# Patient Record
Sex: MALE | Race: WHITE | NOT HISPANIC OR LATINO | Employment: OTHER | URBAN - METROPOLITAN AREA
[De-identification: names, ages, dates, MRNs, and addresses within clinical notes are randomized per-mention and may not be internally consistent; named-entity substitution may affect disease eponyms.]

---

## 2017-12-06 ENCOUNTER — HOSPITAL ENCOUNTER (OUTPATIENT)
Dept: CARDIOLOGY | Facility: HOSPITAL | Age: 67
Discharge: HOME | End: 2017-12-06
Attending: INTERNAL MEDICINE | Admitting: INTERNAL MEDICINE

## 2020-12-11 ENCOUNTER — HOSPITAL ENCOUNTER (EMERGENCY)
Facility: HOSPITAL | Age: 70
Discharge: HOME | End: 2020-12-11
Admitting: EMERGENCY MEDICINE

## 2020-12-11 ENCOUNTER — APPOINTMENT (OUTPATIENT)
Dept: CT IMAGING | Facility: HOSPITAL | Age: 70
End: 2020-12-11

## 2020-12-11 VITALS
BODY MASS INDEX: 32.58 KG/M2 | TEMPERATURE: 98 F | HEART RATE: 60 BPM | WEIGHT: 215 LBS | DIASTOLIC BLOOD PRESSURE: 76 MMHG | HEIGHT: 68 IN | OXYGEN SATURATION: 99 % | RESPIRATION RATE: 18 BRPM | SYSTOLIC BLOOD PRESSURE: 144 MMHG

## 2020-12-11 DIAGNOSIS — K44.9 HIATAL HERNIA: ICD-10-CM

## 2020-12-11 DIAGNOSIS — N28.9 RENAL LESION: ICD-10-CM

## 2020-12-11 DIAGNOSIS — R10.13 EPIGASTRIC PAIN: Primary | ICD-10-CM

## 2020-12-11 DIAGNOSIS — R11.2 NAUSEA AND VOMITING, INTRACTABILITY OF VOMITING NOT SPECIFIED, UNSPECIFIED VOMITING TYPE: ICD-10-CM

## 2020-12-11 LAB
ALBUMIN SERPL-MCNC: 3.5 G/DL (ref 3.5–5.2)
ALBUMIN/GLOB SERPL: 1 G/DL
ALP SERPL-CCNC: 75 U/L (ref 39–117)
ALT SERPL W P-5'-P-CCNC: 16 U/L (ref 1–41)
ANION GAP SERPL CALCULATED.3IONS-SCNC: 12 MMOL/L (ref 5–15)
AST SERPL-CCNC: 35 U/L (ref 1–40)
BASOPHILS # BLD AUTO: 0 10*3/MM3 (ref 0–0.2)
BASOPHILS NFR BLD AUTO: 0.5 % (ref 0–1.5)
BILIRUB SERPL-MCNC: 1 MG/DL (ref 0–1.2)
BILIRUB UR QL STRIP: NEGATIVE
BUN SERPL-MCNC: 14 MG/DL (ref 8–23)
BUN/CREAT SERPL: 13 (ref 7–25)
CALCIUM SPEC-SCNC: 8.7 MG/DL (ref 8.6–10.5)
CHLORIDE SERPL-SCNC: 104 MMOL/L (ref 98–107)
CLARITY UR: CLEAR
CO2 SERPL-SCNC: 22 MMOL/L (ref 22–29)
COLOR UR: YELLOW
CREAT SERPL-MCNC: 1.08 MG/DL (ref 0.76–1.27)
DEPRECATED RDW RBC AUTO: 39.4 FL (ref 37–54)
EOSINOPHIL # BLD AUTO: 0 10*3/MM3 (ref 0–0.4)
EOSINOPHIL NFR BLD AUTO: 0 % (ref 0.3–6.2)
ERYTHROCYTE [DISTWIDTH] IN BLOOD BY AUTOMATED COUNT: 13.1 % (ref 12.3–15.4)
GFR SERPL CREATININE-BSD FRML MDRD: 67 ML/MIN/1.73
GLOBULIN UR ELPH-MCNC: 3.6 GM/DL
GLUCOSE SERPL-MCNC: 150 MG/DL (ref 65–99)
GLUCOSE UR STRIP-MCNC: NEGATIVE MG/DL
HCT VFR BLD AUTO: 36.9 % (ref 37.5–51)
HGB BLD-MCNC: 12.7 G/DL (ref 13–17.7)
HGB UR QL STRIP.AUTO: NEGATIVE
KETONES UR QL STRIP: ABNORMAL
LEUKOCYTE ESTERASE UR QL STRIP.AUTO: NEGATIVE
LIPASE SERPL-CCNC: 34 U/L (ref 13–60)
LYMPHOCYTES # BLD AUTO: 1.1 10*3/MM3 (ref 0.7–3.1)
LYMPHOCYTES NFR BLD AUTO: 11.8 % (ref 19.6–45.3)
MCH RBC QN AUTO: 29.9 PG (ref 26.6–33)
MCHC RBC AUTO-ENTMCNC: 34.4 G/DL (ref 31.5–35.7)
MCV RBC AUTO: 86.9 FL (ref 79–97)
MONOCYTES # BLD AUTO: 0.6 10*3/MM3 (ref 0.1–0.9)
MONOCYTES NFR BLD AUTO: 6.6 % (ref 5–12)
NEUTROPHILS NFR BLD AUTO: 7.3 10*3/MM3 (ref 1.7–7)
NEUTROPHILS NFR BLD AUTO: 81.1 % (ref 42.7–76)
NITRITE UR QL STRIP: NEGATIVE
NRBC BLD AUTO-RTO: 0 /100 WBC (ref 0–0.2)
PH UR STRIP.AUTO: 8 [PH] (ref 5–8)
PLATELET # BLD AUTO: 256 10*3/MM3 (ref 140–450)
PMV BLD AUTO: 7.2 FL (ref 6–12)
POTASSIUM SERPL-SCNC: 3.5 MMOL/L (ref 3.5–5.2)
PROT SERPL-MCNC: 7.1 G/DL (ref 6–8.5)
PROT UR QL STRIP: NEGATIVE
RBC # BLD AUTO: 4.25 10*6/MM3 (ref 4.14–5.8)
SODIUM SERPL-SCNC: 138 MMOL/L (ref 136–145)
SP GR UR STRIP: 1.01 (ref 1–1.03)
TROPONIN T SERPL-MCNC: <0.01 NG/ML (ref 0–0.03)
UROBILINOGEN UR QL STRIP: ABNORMAL
WBC # BLD AUTO: 9 10*3/MM3 (ref 3.4–10.8)

## 2020-12-11 PROCEDURE — 96376 TX/PRO/DX INJ SAME DRUG ADON: CPT

## 2020-12-11 PROCEDURE — 25010000002 ONDANSETRON PER 1 MG: Performed by: PHYSICIAN ASSISTANT

## 2020-12-11 PROCEDURE — 0 IOPAMIDOL PER 1 ML: Performed by: PHYSICIAN ASSISTANT

## 2020-12-11 PROCEDURE — 96374 THER/PROPH/DIAG INJ IV PUSH: CPT

## 2020-12-11 PROCEDURE — 25010000002 ONDANSETRON PER 1 MG: Performed by: EMERGENCY MEDICINE

## 2020-12-11 PROCEDURE — 83690 ASSAY OF LIPASE: CPT | Performed by: PHYSICIAN ASSISTANT

## 2020-12-11 PROCEDURE — 85025 COMPLETE CBC W/AUTO DIFF WBC: CPT | Performed by: PHYSICIAN ASSISTANT

## 2020-12-11 PROCEDURE — 25010000002 LORAZEPAM PER 2 MG: Performed by: EMERGENCY MEDICINE

## 2020-12-11 PROCEDURE — 84484 ASSAY OF TROPONIN QUANT: CPT | Performed by: PHYSICIAN ASSISTANT

## 2020-12-11 PROCEDURE — 80053 COMPREHEN METABOLIC PANEL: CPT | Performed by: PHYSICIAN ASSISTANT

## 2020-12-11 PROCEDURE — 96361 HYDRATE IV INFUSION ADD-ON: CPT

## 2020-12-11 PROCEDURE — 96375 TX/PRO/DX INJ NEW DRUG ADDON: CPT

## 2020-12-11 PROCEDURE — 74177 CT ABD & PELVIS W/CONTRAST: CPT

## 2020-12-11 PROCEDURE — 81003 URINALYSIS AUTO W/O SCOPE: CPT | Performed by: PHYSICIAN ASSISTANT

## 2020-12-11 PROCEDURE — 99283 EMERGENCY DEPT VISIT LOW MDM: CPT

## 2020-12-11 RX ORDER — LIDOCAINE HYDROCHLORIDE 20 MG/ML
15 SOLUTION OROPHARYNGEAL ONCE
Status: COMPLETED | OUTPATIENT
Start: 2020-12-11 | End: 2020-12-11

## 2020-12-11 RX ORDER — HYDROXYZINE HYDROCHLORIDE 25 MG/1
25 TABLET, FILM COATED ORAL EVERY 6 HOURS PRN
Qty: 16 TABLET | Refills: 0 | Status: SHIPPED | OUTPATIENT
Start: 2020-12-11

## 2020-12-11 RX ORDER — SODIUM CHLORIDE 0.9 % (FLUSH) 0.9 %
10 SYRINGE (ML) INJECTION AS NEEDED
Status: DISCONTINUED | OUTPATIENT
Start: 2020-12-11 | End: 2020-12-11 | Stop reason: HOSPADM

## 2020-12-11 RX ORDER — ONDANSETRON 2 MG/ML
4 INJECTION INTRAMUSCULAR; INTRAVENOUS ONCE
Status: COMPLETED | OUTPATIENT
Start: 2020-12-11 | End: 2020-12-11

## 2020-12-11 RX ORDER — ONDANSETRON 4 MG/1
4 TABLET, ORALLY DISINTEGRATING ORAL EVERY 8 HOURS PRN
Qty: 20 TABLET | Refills: 0 | Status: SHIPPED | OUTPATIENT
Start: 2020-12-11

## 2020-12-11 RX ORDER — ALUMINA, MAGNESIA, AND SIMETHICONE 2400; 2400; 240 MG/30ML; MG/30ML; MG/30ML
15 SUSPENSION ORAL ONCE
Status: COMPLETED | OUTPATIENT
Start: 2020-12-11 | End: 2020-12-11

## 2020-12-11 RX ORDER — OMEPRAZOLE 40 MG/1
40 CAPSULE, DELAYED RELEASE ORAL DAILY
Qty: 14 CAPSULE | Refills: 0 | Status: SHIPPED | OUTPATIENT
Start: 2020-12-11

## 2020-12-11 RX ORDER — LORAZEPAM 2 MG/ML
0.5 INJECTION INTRAMUSCULAR ONCE
Status: COMPLETED | OUTPATIENT
Start: 2020-12-11 | End: 2020-12-11

## 2020-12-11 RX ADMIN — LORAZEPAM 0.5 MG: 2 INJECTION INTRAMUSCULAR; INTRAVENOUS at 14:47

## 2020-12-11 RX ADMIN — MAGNESIUM HYDROXIDE,ALUMINUM HYDROXICE,SIMETHICONE 15 ML: 240; 2400; 2400 SUSPENSION ORAL at 11:43

## 2020-12-11 RX ADMIN — ONDANSETRON 4 MG: 2 INJECTION, SOLUTION INTRAMUSCULAR; INTRAVENOUS at 11:43

## 2020-12-11 RX ADMIN — ONDANSETRON 4 MG: 2 INJECTION, SOLUTION INTRAMUSCULAR; INTRAVENOUS at 11:54

## 2020-12-11 RX ADMIN — IOPAMIDOL 100 ML: 755 INJECTION, SOLUTION INTRAVENOUS at 13:59

## 2020-12-11 RX ADMIN — LIDOCAINE HYDROCHLORIDE 15 ML: 20 SOLUTION ORAL; TOPICAL at 11:43

## 2020-12-11 RX ADMIN — ONDANSETRON 4 MG: 2 INJECTION, SOLUTION INTRAMUSCULAR; INTRAVENOUS at 14:48

## 2020-12-11 RX ADMIN — SODIUM CHLORIDE 1000 ML: 9 INJECTION, SOLUTION INTRAVENOUS at 11:44

## 2020-12-11 NOTE — ED NOTES
Pt reports he has had trouble urinating for the past 3 days and nausea. Pt used urinal when he got into ER room and was able to urinate and states he had a little bit of pain. EMS gave 4mg of zofran on route.      Mariela Colon, RN  12/11/20 1519

## 2020-12-11 NOTE — DISCHARGE INSTRUCTIONS
Take omeprazole as directed.  Take Zofran as needed for nausea.  Drink plenty of clear fluids eat small bland meals over the next 2 to 3 days.    Follow-up with urology in regards to your urinary symptoms.  You may also follow-up with gastroenterology or general surgery in regards to your hiatal hernia and abdominal pain.    Use hydroxyzine as needed for anxiety.    Follow-up with your primary care provider in 3-5 days.  If you do not have a primary care provider call [phone] for help in finding one, or you may follow up with [facility] at [phone].    Return to ED for any new or worsening symptoms

## 2020-12-11 NOTE — ED PROVIDER NOTES
"Subjective   Patient is a 70-year-old male who presents with complaints of epigastric abdominal pain for the past 2 days.  He describes as an intermittent achy type pain that he rates a 9/10 severity.  Patient denies any radiation of the pain from this area.  Patient does report associated nausea and one episode of emesis while in the ED today.  He denies any hematemesis.  Patient also reports some trouble urinating\" over the past month.  Patient reports that he is \"dribbling\" he denies any dysuria or hematuria.  He also denies any flank pain body aches or chills or fever.  States he taken no medications for symptoms.  Patient does report increase in anxiety he has talked to his primary care about.  Patient states that his anxiety sometimes causes his nausea denies any chest pain shortness of breath cough rhinorrhea or nasal congestion.  He denies any other alleviating or just pain factors of his symptoms.          Review of Systems   Constitutional: Negative.    HENT: Negative.    Eyes: Negative for photophobia and visual disturbance.   Respiratory: Negative.    Cardiovascular: Negative.    Gastrointestinal: Positive for abdominal pain and nausea. Negative for abdominal distention, constipation, diarrhea and vomiting.   Genitourinary: Positive for decreased urine volume and difficulty urinating. Negative for dysuria, flank pain, frequency, hematuria, penile pain, penile swelling, scrotal swelling, testicular pain and urgency.   Musculoskeletal: Negative for neck pain and neck stiffness.   Skin: Negative.    Neurological: Negative.        Past Medical History:   Diagnosis Date   • Disease of thyroid gland    • Hypertension        No Known Allergies    Past Surgical History:   Procedure Laterality Date   • APPENDECTOMY         History reviewed. No pertinent family history.    Social History     Socioeconomic History   • Marital status:      Spouse name: Not on file   • Number of children: Not on file   • " Years of education: Not on file   • Highest education level: Not on file   Tobacco Use   • Smoking status: Never Smoker   • Smokeless tobacco: Never Used   Substance and Sexual Activity   • Alcohol use: Never     Frequency: Never   • Drug use: Never           Objective   Physical Exam  Vitals signs and nursing note reviewed.   Constitutional:       General: He is not in acute distress.     Appearance: He is well-developed. He is not ill-appearing, toxic-appearing or diaphoretic.   HENT:      Head: Normocephalic and atraumatic.      Mouth/Throat:      Mouth: Mucous membranes are moist.      Pharynx: Oropharynx is clear.   Eyes:      General: No scleral icterus.     Extraocular Movements: Extraocular movements intact.      Pupils: Pupils are equal, round, and reactive to light.   Neck:      Musculoskeletal: Normal range of motion and neck supple.   Cardiovascular:      Rate and Rhythm: Normal rate and regular rhythm.      Pulses: Normal pulses.      Heart sounds: Normal heart sounds. No murmur. No friction rub. No gallop.    Pulmonary:      Effort: Pulmonary effort is normal. No tachypnea or accessory muscle usage.      Breath sounds: Normal breath sounds. No stridor. No decreased breath sounds, wheezing, rhonchi or rales.   Chest:      Chest wall: No mass, deformity, tenderness or crepitus.   Abdominal:      General: Abdomen is flat. Bowel sounds are normal.      Palpations: Abdomen is soft. There is no hepatomegaly, splenomegaly or mass.      Tenderness: There is abdominal tenderness in the epigastric area. There is no right CVA tenderness, left CVA tenderness, guarding or rebound. Negative signs include Graves's sign and McBurney's sign.      Hernia: No hernia is present.   Skin:     General: Skin is warm.      Capillary Refill: Capillary refill takes less than 2 seconds.      Findings: No rash.   Neurological:      Mental Status: He is alert and oriented to person, place, and time.   Psychiatric:         Mood and  "Affect: Mood normal.         Behavior: Behavior normal.         Procedures           ED Course    /60   Pulse 60   Temp 97.8 °F (36.6 °C) (Oral)   Resp 18   Ht 172.7 cm (68\")   Wt 97.5 kg (215 lb)   SpO2 97%   BMI 32.69 kg/m²   Medications   sodium chloride 0.9 % flush 10 mL (has no administration in time range)   sodium chloride 0.9 % flush 10 mL (has no administration in time range)   ondansetron (ZOFRAN) injection 4 mg (4 mg Intravenous Given 12/11/20 1143)   sodium chloride 0.9 % bolus 1,000 mL (0 mL Intravenous Stopped 12/11/20 1406)   aluminum-magnesium hydroxide-simethicone (MAALOX MAX) 400-400-40 MG/5ML suspension 15 mL (15 mL Oral Given 12/11/20 1143)   Lidocaine Viscous HCl (XYLOCAINE) 2 % mouth solution 15 mL (15 mL Mouth/Throat Given 12/11/20 1143)   ondansetron (ZOFRAN) injection 4 mg (4 mg Intravenous Given 12/11/20 1154)   iopamidol (ISOVUE-370) 76 % injection 100 mL (100 mL Intravenous Given 12/11/20 1359)   ondansetron (ZOFRAN) injection 4 mg (4 mg Intravenous Given 12/11/20 1448)   LORazepam (ATIVAN) injection 0.5 mg (0.5 mg Intravenous Given 12/11/20 1447)     Labs Reviewed   COMPREHENSIVE METABOLIC PANEL - Abnormal; Notable for the following components:       Result Value    Glucose 150 (*)     All other components within normal limits    Narrative:     GFR Normal >60  Chronic Kidney Disease <60  Kidney Failure <15     URINALYSIS W/ CULTURE IF INDICATED - Abnormal; Notable for the following components:    Ketones, UA Trace (*)     All other components within normal limits    Narrative:     Urine microscopic not indicated.   CBC WITH AUTO DIFFERENTIAL - Abnormal; Notable for the following components:    Hemoglobin 12.7 (*)     Hematocrit 36.9 (*)     Neutrophil % 81.1 (*)     Lymphocyte % 11.8 (*)     Eosinophil % 0.0 (*)     Neutrophils, Absolute 7.30 (*)     All other components within normal limits   LIPASE - Normal   TROPONIN (IN-HOUSE) - Normal    Narrative:     Troponin T " Reference Range:  <= 0.03 ng/mL-   Negative for AMI  >0.03 ng/mL-     Abnormal for myocardial necrosis.  Clinicians would have to utilize clinical acumen, EKG, Troponin and serial changes to determine if it is an Acute Myocardial Infarction or myocardial injury due to an underlying chronic condition.       Results may be falsely decreased if patient taking Biotin.     CBC AND DIFFERENTIAL    Narrative:     The following orders were created for panel order CBC & Differential.  Procedure                               Abnormality         Status                     ---------                               -----------         ------                     CBC Auto Differential[[id number]]        Abnormal            Final result                 Please view results for these tests on the individual orders.     Ct Abdomen Pelvis With Contrast    Result Date: 12/11/2020  1. Tiny hiatal hernia with lower esophageal wall thickening, correlate for findings of reflux/esophagitis. 2. Small 10 mm lesion at the upper pole right kidney may relate enhancing solid lesion versus hemorrhagic cyst, recommend MRI abdomen renal protocol with contrast nonemergently for further assessment. 3. Status post appendectomy.    Electronically Signed By-Eliot House MD On:12/11/2020 2:10 PM This report was finalized on [id number] by  Eliot House MD.                                           MDM  Number of Diagnoses or Management Options  Epigastric pain:   Hiatal hernia:   Nausea and vomiting, intractability of vomiting not specified, unspecified vomiting type:   Renal lesion:   Diagnosis management comments: Chart Review:  Comorbidity: As per past medical history  ECG: Not warranted   labs: Urinalysis unremarkable for UTI.  Troponin within normal limits.  Lipase 34.  CBC shows WBC 9 hemoglobin 10.7 hematocrit 36.9 platelets 256.  Imaging: Was interpreted by physician and reviewed by myself:  Ct Abdomen Pelvis With Contrast  Result Date:  12/11/2020  1. Tiny hiatal hernia with lower esophageal wall thickening, correlate for findings of reflux/esophagitis. 2. Small 10 mm lesion at the upper pole right kidney may relate enhancing solid lesion versus hemorrhagic cyst, recommend MRI abdomen renal protocol with contrast nonemergently for further assessment. 3. Status post appendectomy.    Electronically Signed By-Eliot House MD On:12/11/2020 2:10 PM This report was finalized on [id number] by  Eliot House MD.    Disposition/Treatment:  Insert PPE while in the ED IV was placed and labs were obtained patient was afebrile and appeared nontoxic he was given Zofran and GI cocktail.  Patient had some continued nausea he was given additional Zofran he was also very anxious throughout his ED stay and requested to anxiety medication he was given 0.5 mg of Ativan with improvement.  Upon reassessment patient is resting quietly lab.  Lab results were fairly unremarkable there are no signs of severe dehydration or infection urinalysis showed no signs of UTI.  CT of abdomen pelvis was significant for a hiatal hernia which could be causing some GERD or esophagitis this is likely the cause of patient's epigastric pain.  He also had a renal lesion as above.  Lab results and findings were discussed with the patient who voiced understanding of discharge instructions along with signs and symptoms requiring return to the ED.  Upon discharged patient was in stable condition with followup for a revaluation.  Patient will be given omeprazole and Zofran for home.  He also be given hydroxyzine for his anxiety he was advised to follow-up with his primary care provider for further evaluation management.  Due to patient's urinary complaints he will also be advised to follow-up with urology.  He will be advised to follow-up with GI general surgery for further evaluation and management of his hiatal hernia and abdominal pain.       Amount and/or Complexity of Data  Reviewed  Clinical lab tests: reviewed  Tests in the radiology section of CPT®: reviewed        Final diagnoses:   Epigastric pain   Nausea and vomiting, intractability of vomiting not specified, unspecified vomiting type   Renal lesion   Hiatal hernia            Nola Knight PA  12/11/20 0447

## 2022-06-01 ENCOUNTER — ON CAMPUS - OUTPATIENT (AMBULATORY)
Dept: URBAN - METROPOLITAN AREA HOSPITAL 77 | Facility: HOSPITAL | Age: 72
End: 2022-06-01

## 2022-06-01 DIAGNOSIS — E87.6 HYPOKALEMIA: ICD-10-CM

## 2022-06-01 DIAGNOSIS — R10.13 EPIGASTRIC PAIN: ICD-10-CM

## 2022-06-01 DIAGNOSIS — R11.2 NAUSEA WITH VOMITING, UNSPECIFIED: ICD-10-CM

## 2022-06-01 DIAGNOSIS — R13.10 DYSPHAGIA, UNSPECIFIED: ICD-10-CM

## 2022-06-01 DIAGNOSIS — K59.00 CONSTIPATION, UNSPECIFIED: ICD-10-CM

## 2022-06-01 DIAGNOSIS — R17 UNSPECIFIED JAUNDICE: ICD-10-CM

## 2022-06-01 PROCEDURE — 99213 OFFICE O/P EST LOW 20 MIN: CPT | Performed by: NURSE PRACTITIONER

## 2022-06-02 ENCOUNTER — ON CAMPUS - OUTPATIENT (AMBULATORY)
Dept: URBAN - METROPOLITAN AREA HOSPITAL 77 | Facility: HOSPITAL | Age: 72
End: 2022-06-02
Payer: COMMERCIAL

## 2022-06-02 DIAGNOSIS — R10.13 EPIGASTRIC PAIN: ICD-10-CM

## 2022-06-02 DIAGNOSIS — K44.9 DIAPHRAGMATIC HERNIA WITHOUT OBSTRUCTION OR GANGRENE: ICD-10-CM

## 2022-06-02 DIAGNOSIS — R13.10 DYSPHAGIA, UNSPECIFIED: ICD-10-CM

## 2022-06-02 DIAGNOSIS — K20.80 OTHER ESOPHAGITIS WITHOUT BLEEDING: ICD-10-CM

## 2022-06-02 DIAGNOSIS — K29.70 GASTRITIS, UNSPECIFIED, WITHOUT BLEEDING: ICD-10-CM

## 2022-06-02 DIAGNOSIS — K22.2 ESOPHAGEAL OBSTRUCTION: ICD-10-CM

## 2022-06-02 PROCEDURE — 43239 EGD BIOPSY SINGLE/MULTIPLE: CPT | Mod: 59 | Performed by: INTERNAL MEDICINE

## 2022-06-02 PROCEDURE — 43249 ESOPH EGD DILATION <30 MM: CPT | Performed by: INTERNAL MEDICINE

## 2022-08-03 RX ORDER — HYDROCHLOROTHIAZIDE 25 MG/1
25 TABLET ORAL DAILY
COMMUNITY

## 2022-08-03 RX ORDER — BUPRENORPHINE HYDROCHLORIDE AND NALOXONE HYDROCHLORIDE DIHYDRATE 8; 2 MG/1; MG/1
1 TABLET SUBLINGUAL 2 TIMES DAILY
COMMUNITY

## 2022-08-03 RX ORDER — LOSARTAN POTASSIUM 100 MG/1
100 TABLET ORAL DAILY
COMMUNITY

## 2022-08-08 ENCOUNTER — LAB (OUTPATIENT)
Dept: LAB | Facility: HOSPITAL | Age: 72
End: 2022-08-08

## 2022-08-08 PROCEDURE — C9803 HOPD COVID-19 SPEC COLLECT: HCPCS

## 2022-08-08 PROCEDURE — U0004 COV-19 TEST NON-CDC HGH THRU: HCPCS

## 2022-08-09 ENCOUNTER — ANESTHESIA EVENT (OUTPATIENT)
Dept: GASTROENTEROLOGY | Facility: HOSPITAL | Age: 72
End: 2022-08-09

## 2022-08-09 LAB — SARS-COV-2 ORF1AB RESP QL NAA+PROBE: NOT DETECTED

## 2022-08-09 NOTE — ANESTHESIA PREPROCEDURE EVALUATION
Anesthesia Evaluation     Patient summary reviewed and Nursing notes reviewed   no history of anesthetic complications:  NPO Solid Status: > 8 hours  NPO Liquid Status: > 8 hours           Airway   Dental      Pulmonary    Cardiovascular     (+) hypertension,       Neuro/Psych  GI/Hepatic/Renal/Endo    (+) obesity,  GERD,  thyroid problem     Musculoskeletal     Abdominal    Substance History      OB/GYN          Other        ROS/Med Hx Other: Dysphagia, esophagitis, esophageal stricture    Suboxone use    PSH  APPENDECTOMY                  Anesthesia Plan    ASA 3     MAC     (Patient identified; pre-operative vital signs, all relevant labs/studies, complete medical/surgical/anesthetic history, full medication list, full allergy list, and NPO status obtained/reviewed; physical assessment performed; anesthetic options, side effects, potential complications, risks, and benefits discussed; questions answered; written anesthesia consent obtained; patient cleared for procedure; anesthesia machine and equipment checked and functioning)    Anesthetic plan, risks, benefits, and alternatives have been provided, discussed and informed consent has been obtained with: patient.        CODE STATUS:

## 2022-08-10 ENCOUNTER — HOSPITAL ENCOUNTER (OUTPATIENT)
Facility: HOSPITAL | Age: 72
Setting detail: HOSPITAL OUTPATIENT SURGERY
Discharge: HOME | End: 2022-08-10
Attending: INTERNAL MEDICINE | Admitting: INTERNAL MEDICINE

## 2022-08-10 ENCOUNTER — ANESTHESIA (OUTPATIENT)
Dept: GASTROENTEROLOGY | Facility: HOSPITAL | Age: 72
End: 2022-08-10

## 2022-08-10 ENCOUNTER — ON CAMPUS - OUTPATIENT (AMBULATORY)
Dept: URBAN - METROPOLITAN AREA HOSPITAL 85 | Facility: HOSPITAL | Age: 72
End: 2022-08-10
Payer: COMMERCIAL

## 2022-08-10 VITALS
TEMPERATURE: 97.9 F | HEART RATE: 69 BPM | SYSTOLIC BLOOD PRESSURE: 158 MMHG | HEIGHT: 68 IN | DIASTOLIC BLOOD PRESSURE: 85 MMHG | OXYGEN SATURATION: 97 % | BODY MASS INDEX: 35.88 KG/M2 | WEIGHT: 236.77 LBS | RESPIRATION RATE: 16 BRPM

## 2022-08-10 DIAGNOSIS — K20.90 ESOPHAGITIS, UNSPECIFIED WITHOUT BLEEDING: ICD-10-CM

## 2022-08-10 DIAGNOSIS — R13.10 DYSPHAGIA, UNSPECIFIED: ICD-10-CM

## 2022-08-10 DIAGNOSIS — K44.9 DIAPHRAGMATIC HERNIA WITHOUT OBSTRUCTION OR GANGRENE: ICD-10-CM

## 2022-08-10 DIAGNOSIS — K22.2 ESOPHAGEAL OBSTRUCTION: ICD-10-CM

## 2022-08-10 PROCEDURE — 25010000002 ONDANSETRON PER 1 MG: Performed by: INTERNAL MEDICINE

## 2022-08-10 PROCEDURE — 25010000002 PROPOFOL 200 MG/20ML EMULSION: Performed by: ANESTHESIOLOGY

## 2022-08-10 PROCEDURE — 43235 EGD DIAGNOSTIC BRUSH WASH: CPT | Performed by: INTERNAL MEDICINE

## 2022-08-10 PROCEDURE — 43450 DILATE ESOPHAGUS 1/MULT PASS: CPT | Mod: 59 | Performed by: INTERNAL MEDICINE

## 2022-08-10 PROCEDURE — C1726 CATH, BAL DIL, NON-VASCULAR: HCPCS | Performed by: INTERNAL MEDICINE

## 2022-08-10 RX ORDER — SODIUM CHLORIDE 9 MG/ML
INJECTION, SOLUTION INTRAVENOUS CONTINUOUS PRN
Status: DISCONTINUED | OUTPATIENT
Start: 2022-08-10 | End: 2022-08-10 | Stop reason: SURG

## 2022-08-10 RX ORDER — LIDOCAINE HYDROCHLORIDE 10 MG/ML
INJECTION, SOLUTION EPIDURAL; INFILTRATION; INTRACAUDAL; PERINEURAL AS NEEDED
Status: DISCONTINUED | OUTPATIENT
Start: 2022-08-10 | End: 2022-08-10 | Stop reason: SURG

## 2022-08-10 RX ORDER — OMEPRAZOLE 40 MG/1
40 CAPSULE, DELAYED RELEASE ORAL DAILY
Qty: 90 CAPSULE | Refills: 0 | Status: CANCELLED | OUTPATIENT
Start: 2022-08-10 | End: 2022-11-08

## 2022-08-10 RX ORDER — PROPOFOL 10 MG/ML
INJECTION, EMULSION INTRAVENOUS AS NEEDED
Status: DISCONTINUED | OUTPATIENT
Start: 2022-08-10 | End: 2022-08-10 | Stop reason: SURG

## 2022-08-10 RX ORDER — SODIUM CHLORIDE 9 MG/ML
9 INJECTION, SOLUTION INTRAVENOUS ONCE
Status: COMPLETED | OUTPATIENT
Start: 2022-08-10 | End: 2022-08-10

## 2022-08-10 RX ORDER — OMEPRAZOLE 40 MG/1
40 CAPSULE, DELAYED RELEASE ORAL DAILY
Qty: 90 CAPSULE | Refills: 3 | Status: SHIPPED | OUTPATIENT
Start: 2022-08-10 | End: 2022-11-08

## 2022-08-10 RX ORDER — ONDANSETRON 2 MG/ML
4 INJECTION INTRAMUSCULAR; INTRAVENOUS ONCE
Status: COMPLETED | OUTPATIENT
Start: 2022-08-10 | End: 2022-08-10

## 2022-08-10 RX ADMIN — SODIUM CHLORIDE: 0.9 INJECTION, SOLUTION INTRAVENOUS at 10:19

## 2022-08-10 RX ADMIN — LIDOCAINE HYDROCHLORIDE 50 MG: 10 INJECTION, SOLUTION EPIDURAL; INFILTRATION; INTRACAUDAL; PERINEURAL at 10:24

## 2022-08-10 RX ADMIN — ONDANSETRON 4 MG: 2 INJECTION INTRAMUSCULAR; INTRAVENOUS at 11:04

## 2022-08-10 RX ADMIN — SODIUM CHLORIDE 9 ML/HR: 9 INJECTION, SOLUTION INTRAVENOUS at 10:03

## 2022-08-10 RX ADMIN — PROPOFOL 270 MG: 10 INJECTION, EMULSION INTRAVENOUS at 10:24

## 2022-08-10 NOTE — DISCHARGE INSTRUCTIONS
A responsible adult should stay with you and you should rest quietly for the rest of the day.    Do not drink alcohol, drive, operate any heavy machinery or power tools or make any legal/important decisions for the next 24 hours.     Progress your diet as tolerated.  If you begin to experience severe pain, increased shortness of breath, racing heartbeat or a fever above 101 F, seek immediate medical attention.     Follow up with MD as instructed. Call office for results in 3 to 5 days if needed.    [phone]    Impression:  1.  Esophageal stricture at the GE junction nonobstructing, approximately 13 mm in diameter.  Dilation was performed initially with 40 Peruvian nonguided bougie Todd dilator successfully with minimal resistance and post look endoscopy showed no mucosal tear.  I then switched to through-the-scope balloon dilator and progressively increased the diameter from 12 to 15 mm successfully with moderate resistance and post look endoscopy showed appropriate mucosal splitting consistent with successful dilation.  2.  Medium sized hiatal hernia.  Diaphragmatic hiatus at 40 cm.  Upper extent of the gastric folds at 36 cm.  GE junction at 30 cm.  3.  Abnormal salmon-colored mucosa extending above the upper extent of the gastric folds consistent with Monroy's esophagus.  4.  LA class C erosive esophagitis with linear ulcerations across multiple folds extending 6 cm above the GE junction.  5.  Normal mucosa in the whole stomach  6.  Normal mucosa in the duodenal bulb and second portion of duodenum     Recommendations:  -Repeat EGD in 3 months with further dilation  -Recommend omeprazole 40 mg daily for 3 months, prescription sent to the pharmacy  -Avoid NSAIDs and alcohol  -GERD lifestyle modification  -Plan for biopsies for Monroy's esophagus and of stricture if persistent at follow-up, regardless of esophagitis

## 2022-08-10 NOTE — OP NOTE
ESOPHAGOGASTRODUODENOSCOPY Procedure Report    Patient Name:  Manfred Perry  YOB: 1950    Date of Surgery:  8/10/2022     Pre-Op Diagnosis:  Dysphagia [R13.10]  Esophageal stricture [K22.2]  Esophagitis [K20.90]       Post-Op Diagnosis Codes:     * Dysphagia [R13.10]     * Esophageal stricture [K22.2]     * Esophagitis [K20.90]     Postop diagnosis:  1.  Esophageal stricture  2.  Erosive esophagitis  3.  Hiatal hernia  4.  Abnormal esophageal mucosa consistent with Monroy's esophagus  5.  Normal mucosa in the stomach and duodenum      Procedure/CPT® Codes:      Procedure(s):  EGD WITH DILATION    Staff:  Surgeon(s):  RAJAN Moctezuma MD      Anesthesia: Monitored Anesthesia Care    Description of Procedure:  A description of the procedure as well as risks, benefits and alternative methods were explained to the patient who voiced understanding and signed the corresponding consent form. A physical exam was performed and vital signs were monitored throughout the procedure.    An upper GI endoscope was placed into the mouth and proceeded through the esophagus, stomach and second portion of the duodenum without difficulty. The scope was then retroflexed and the fundus was visualized. The procedure was not difficult and there were no immediate complications.  There was no blood loss.    Impression:  1.  Esophageal stricture at the GE junction nonobstructing, approximately 13 mm in diameter.  Dilation was performed initially with 40 Welsh nonguided bougie Todd dilator successfully with minimal resistance and post look endoscopy showed no mucosal tear.  I then switched to through-the-scope balloon dilator and progressively increased the diameter from 12 to 15 mm successfully with moderate resistance and post look endoscopy showed appropriate mucosal splitting consistent with successful dilation.  2.  Medium sized hiatal hernia.  Diaphragmatic hiatus at 40 cm.  Upper extent of the gastric folds at 36 cm.   GE junction at 30 cm.  3.  Abnormal salmon-colored mucosa extending above the upper extent of the gastric folds consistent with Monroy's esophagus.  4.  LA class C erosive esophagitis with linear ulcerations across multiple folds extending 6 cm above the GE junction.  5.  Normal mucosa in the whole stomach  6.  Normal mucosa in the duodenal bulb and second portion of duodenum    Recommendations:  -Repeat EGD in 3 months with further dilation  -Recommend omeprazole 40 mg daily for 3 months, prescription sent to the pharmacy  -Avoid NSAIDs and alcohol  -GERD lifestyle modification  -Plan for biopsies for Monroy's esophagus and of stricture if persistent at follow-up, regardless of esophagitis      MAXI Moctezuma MD     Date: 8/10/2022    Time: 10:48 EDT

## 2022-08-10 NOTE — H&P
GI CONSULT  NOTE:    Referring Provider:    Piedad Maloney, TED  [unfilled]    Chief complaint: <principal problem not specified>    Subjective .       Pre op diagnosis  Dysphagia  Esophageal stricture      History of present illness:      Manfred Perry is a 72 y.o. male who presents today for Procedure(s):  EGD WITH DILATION for the indications listed below.     The updated Patient Profile was reviewed prior to the procedure, in conjunction with the Physical Exam, including medical conditions, surgical procedures, medications, allergies, family history and social history.     Pre-operatively, I reviewed the indication(s) for the procedure, the risks of the procedure [including but not limited to: unexpected bleeding possibly requiring hospitalization and/or unplanned repeat procedures, perforation possibly requiring surgical treatment, missed lesions and complications of sedation/MAC (also explained by anesthesia staff)].     I have evaluated the patient for risks associated with the planned anesthesia and the procedure to be performed and find the patient an acceptable candidate for IV sedation.    Multiple opportunities were provided for any questions or concerns, and all questions were answered satisfactorily before any anesthesia was administered. We will proceed with the planned procedure.    Past Medical History:  Past Medical History:   Diagnosis Date   • Disease of thyroid gland    • Hypertension        Past Surgical History:  Past Surgical History:   Procedure Laterality Date   • APPENDECTOMY         Social History:  Social History     Tobacco Use   • Smoking status: Never Smoker   • Smokeless tobacco: Never Used   Vaping Use   • Vaping Use: Never used   Substance Use Topics   • Alcohol use: Never   • Drug use: Never     Comment: tramadol       Family History:  No family history on file.    Medications:  Medications Prior to Admission   Medication Sig Dispense Refill Last Dose   •  "buprenorphine-naloxone (SUBOXONE) 8-2 MG per SL tablet Place 1 tablet under the tongue 2 (Two) Times a Day.      • hydroCHLOROthiazide (HYDRODIURIL) 25 MG tablet Take 25 mg by mouth Daily.      • hydrOXYzine (ATARAX) 25 MG tablet Take 1 tablet by mouth Every 6 (Six) Hours As Needed for Anxiety. 16 tablet 0    • losartan (COZAAR) 100 MG tablet Take 100 mg by mouth Daily.      • omeprazole (PrilOSEC) 40 MG capsule Take 1 capsule by mouth Daily. (Patient not taking: Reported on 8/3/2022) 14 capsule 0 Not Taking at Unknown time   • ondansetron ODT (ZOFRAN-ODT) 4 MG disintegrating tablet Place 1 tablet on the tongue Every 8 (Eight) Hours As Needed for Nausea or Vomiting. (Patient not taking: Reported on 8/3/2022) 20 tablet 0 Not Taking at Unknown time       Scheduled Meds:  Continuous Infusions:No current facility-administered medications for this encounter.    PRN Meds:.    ALLERGIES:  Patient has no known allergies.    ROS:  The following systems were reviewed and negative;  Constitution:  No fevers, chills, no unintentional weight loss  Skin: no rash, no jaundice  Eyes:  No blurry vision, no eye pain  HENT:  No change in hearing or smell  Resp:  No dyspnea or cough  CV:  No chest pain or palpitations  :  No dysuria, hematuria  Musculoskeletal:  No leg cramps or arthralgias  Neuro:  No tremor, no numbness  Psych:  No depression or confsusion    Objective     Vital Signs:   Vitals:    08/03/22 1347   Weight: 104 kg (229 lb)   Height: 172.7 cm (68\")       Physical Exam:       General Appearance:    Awake and alert, in no acute distress   Head:    Normocephalic, without obvious abnormality, atraumatic   Throat:   No oral lesions, no thrush, oral mucosa moist   Lungs:     respirations regular, even and unlabored   Skin:   No rash, no jaundice       Results Review:  Lab Results (last 24 hours)     ** No results found for the last 24 hours. **          Imaging Results (Last 24 Hours)     ** No results found for the last 24 " hours. **           I reviewed the patient's labs and imaging.    ASSESSMENT AND PLAN:      Active Problems:    * No active hospital problems. *       Procedure(s):  EGD WITH DILATION      I discussed the patient's findings and my recommendations with the patient.    MAXI Moctezuma MD  08/10/22  09:38 EDT

## 2022-08-10 NOTE — ANESTHESIA POSTPROCEDURE EVALUATION
Patient: Manfred Perry    Procedure Summary     Date: 08/10/22 Room / Location: [facility] ENDOSCOPY 4 / [facility] ENDOSCOPY    Anesthesia Start: 1019 Anesthesia Stop: 1049    Procedure: EGD WITH DILATION (N/A ) Diagnosis:       Dysphagia      Esophageal stricture      Esophagitis      (Dysphagia [R13.10])      (Esophageal stricture [K22.2])      (Esophagitis [K20.90])    Surgeons: RAJAN Moctezuma MD Provider: Tristin Rodríguez MD    Anesthesia Type: MAC ASA Status: 3          Anesthesia Type: MAC    Vitals  Vitals Value Taken Time   /76 08/10/22 1048   Temp     Pulse 79 08/10/22 1049   Resp 16 08/10/22 1048   SpO2 99 % 08/10/22 1049   Vitals shown include unvalidated device data.        Post Anesthesia Care and Evaluation    Patient location during evaluation: PACU  Patient participation: complete - patient participated  Level of consciousness: awake  Pain scale: See nurse's notes for pain score.  Pain management: adequate    Airway patency: patent  Anesthetic complications: No anesthetic complications  PONV Status: none  Cardiovascular status: acceptable  Respiratory status: acceptable and spontaneous ventilation  Hydration status: acceptable    Comments: Patient seen and examined postoperatively; vital signs stable; SpO2 greater than or equal to 90%; cardiopulmonary status stable; nausea/vomiting adequately controlled; pain adequately controlled; no apparent anesthesia complications; patient discharged from anesthesia care when discharge criteria were met

## 2022-11-15 ENCOUNTER — ON CAMPUS - OUTPATIENT (AMBULATORY)
Dept: URBAN - METROPOLITAN AREA HOSPITAL 85 | Facility: HOSPITAL | Age: 72
End: 2022-11-15
Payer: COMMERCIAL

## 2022-11-15 ENCOUNTER — ANESTHESIA (OUTPATIENT)
Dept: GASTROENTEROLOGY | Facility: HOSPITAL | Age: 72
End: 2022-11-15

## 2022-11-15 ENCOUNTER — HOSPITAL ENCOUNTER (OUTPATIENT)
Facility: HOSPITAL | Age: 72
Setting detail: HOSPITAL OUTPATIENT SURGERY
Discharge: HOME | End: 2022-11-15
Attending: INTERNAL MEDICINE | Admitting: INTERNAL MEDICINE

## 2022-11-15 ENCOUNTER — ANESTHESIA EVENT (OUTPATIENT)
Dept: GASTROENTEROLOGY | Facility: HOSPITAL | Age: 72
End: 2022-11-15

## 2022-11-15 VITALS
WEIGHT: 216.49 LBS | DIASTOLIC BLOOD PRESSURE: 84 MMHG | HEIGHT: 68 IN | TEMPERATURE: 98.2 F | SYSTOLIC BLOOD PRESSURE: 145 MMHG | OXYGEN SATURATION: 90 % | HEART RATE: 81 BPM | BODY MASS INDEX: 32.81 KG/M2 | RESPIRATION RATE: 12 BRPM

## 2022-11-15 DIAGNOSIS — K20.80 OTHER ESOPHAGITIS WITHOUT BLEEDING: ICD-10-CM

## 2022-11-15 DIAGNOSIS — K44.9 DIAPHRAGMATIC HERNIA WITHOUT OBSTRUCTION OR GANGRENE: ICD-10-CM

## 2022-11-15 DIAGNOSIS — K22.2 ESOPHAGEAL OBSTRUCTION: ICD-10-CM

## 2022-11-15 DIAGNOSIS — R13.10 DYSPHAGIA, UNSPECIFIED: ICD-10-CM

## 2022-11-15 PROCEDURE — 43235 EGD DIAGNOSTIC BRUSH WASH: CPT | Performed by: INTERNAL MEDICINE

## 2022-11-15 PROCEDURE — 25010000002 PROPOFOL 10 MG/ML EMULSION: Performed by: ANESTHESIOLOGIST ASSISTANT

## 2022-11-15 PROCEDURE — 43450 DILATE ESOPHAGUS 1/MULT PASS: CPT | Mod: 59 | Performed by: INTERNAL MEDICINE

## 2022-11-15 RX ORDER — LIDOCAINE HYDROCHLORIDE 20 MG/ML
INJECTION, SOLUTION EPIDURAL; INFILTRATION; INTRACAUDAL; PERINEURAL AS NEEDED
Status: DISCONTINUED | OUTPATIENT
Start: 2022-11-15 | End: 2022-11-15 | Stop reason: SURG

## 2022-11-15 RX ORDER — PROPOFOL 10 MG/ML
VIAL (ML) INTRAVENOUS AS NEEDED
Status: DISCONTINUED | OUTPATIENT
Start: 2022-11-15 | End: 2022-11-15 | Stop reason: SURG

## 2022-11-15 RX ORDER — SODIUM CHLORIDE 9 MG/ML
INJECTION, SOLUTION INTRAVENOUS CONTINUOUS PRN
Status: DISCONTINUED | OUTPATIENT
Start: 2022-11-15 | End: 2022-11-15 | Stop reason: SURG

## 2022-11-15 RX ADMIN — PROPOFOL 200 MG: 10 INJECTION, EMULSION INTRAVENOUS at 11:07

## 2022-11-15 RX ADMIN — SODIUM CHLORIDE: 0.9 INJECTION, SOLUTION INTRAVENOUS at 10:54

## 2022-11-15 RX ADMIN — LIDOCAINE HYDROCHLORIDE 80 MG: 20 INJECTION, SOLUTION EPIDURAL; INFILTRATION; INTRACAUDAL; PERINEURAL at 11:06

## 2022-11-15 NOTE — ANESTHESIA POSTPROCEDURE EVALUATION
Patient: Manfred Perry    Procedure Summary     Date: 11/15/22 Room / Location: [facility] ENDOSCOPY 4 / [facility] ENDOSCOPY    Anesthesia Start: 1103 Anesthesia Stop: 1117    Procedure: ESOPHAGOGASTRODUODENOSCOPY WITH DILATION ( BOUGIE 46, ) Diagnosis:       Esophageal stricture      Dysphagia      (Esophageal stricture [K22.2])      (Dysphagia [R13.10])    Surgeons: RAJAN Moctezuma MD Provider: Winston Hernandez MD    Anesthesia Type: MAC ASA Status: 2          Anesthesia Type: MAC    Vitals  Vitals Value Taken Time   /84 11/15/22 1134   Temp     Pulse 80 11/15/22 1137   Resp 12 11/15/22 1134   SpO2 93 % 11/15/22 1137   Vitals shown include unvalidated device data.        Post Anesthesia Care and Evaluation    Patient location during evaluation: bedside  Patient participation: complete - patient participated  Level of consciousness: awake and alert  Pain score: 1  Pain management: adequate    Airway patency: patent  Anesthetic complications: No anesthetic complications  PONV Status: none  Cardiovascular status: acceptable  Respiratory status: acceptable  Hydration status: acceptable  Post Neuraxial Block status: Motor and sensory function returned to baseline

## 2022-11-15 NOTE — H&P
ESOPHAGOGASTRODUODENOSCOPY Procedure Report    Patient Name:  Manfred Perry  YOB: 1950    Date of Surgery:  11/15/2022     Pre-Op Diagnosis:  Esophageal stricture [K22.2]  Dysphagia [R13.10]       Post-Op Diagnosis Codes:     * Esophageal stricture [K22.2]     * Dysphagia [R13.10]    Postop diagnosis:  1.  Esophageal stricture  2.  erosive esophagitis  3.  Hiatal hernia    Procedure/CPT® Codes:      Procedure(s):  ESOPHAGOGASTRODUODENOSCOPY WITH DILATION ( BOUGIE 46, )    Staff:  Surgeon(s):  RAJAN Moctezuma MD      Anesthesia: Monitored Anesthesia Care    Description of Procedure:  A description of the procedure as well as risks, benefits and alternative methods were explained to the patient who voiced understanding and signed the corresponding consent form. A physical exam was performed and vital signs were monitored throughout the procedure.    An upper GI endoscope was placed into the mouth and proceeded through the esophagus, stomach and second portion of the duodenum without difficulty. The scope was then retroflexed and the fundus was visualized. The procedure was not difficult and there were no immediate complications.  There was no blood loss.    Impression:  1.  Esophageal stricture at the GE junction approximately 15 mm in diameter, easily traversed prior to dilation.  Dilation was performed with 46 Scottish Todd dilator with moderate resistance and post look endoscopy showed appropriate mucosal splitting consistent with successful dilation  2.  Erosive esophagitis LA class B at the GE junction around the stricture, improved compared to prior  3.  Medium size hiatal hernia  4.  Normal mucosa in the stomach  5.  Pale mucosa in the duodenal bulb and second portion of the duodenum, but normal appearing villi.     Recommendations:  -Repeat EGD in 4 to 6 weeks with repeat dilation, and possibly biopsies for barretts esophagus and pale duodenal mucosa  -Continue PPI daily  -Hiatal hernia  education  -Repeat future EGD as needed for dysphagia      MAXI Moctezuma MD     Date: 11/15/2022    Time: 11:14 EST

## 2022-11-15 NOTE — DISCHARGE INSTRUCTIONS
A responsible adult should stay with you and you should rest quietly for the rest of the day.    Do not drink alcohol, drive operate any heavy machinery or power tools or make any legal/important decisions for the next 24 hours.    Progress your diet as tolerated.  If you begin to experience severe pain, increased shortness of breath, racing heartbeat or a fever above 101F, seek immediate medical attention.     Follow up with MD as instructed.  Call office for results in 3 to 5 days if needed.    Impression:  1.  Esophageal stricture at the GE junction approximately 15 mm in diameter, easily traversed prior to dilation.  Dilation was performed with 46 French Todd dilator with moderate resistance and post look endoscopy showed appropriate mucosal splitting consistent with successful dilation  2.  Erosive esophagitis LA class B at the GE junction around the stricture, improved compared to prior  3.  Medium size hiatal hernia  4.  Normal mucosa in the stomach  5.  Pale mucosa in the duodenal bulb and second portion of the duodenum, but normal appearing villi.      Recommendations:  -Repeat EGD in 4 to 6 weeks with repeat dilation, and possibly biopsies for barretts esophagus and pale duodenal mucosa  -Continue PPI daily  -Hiatal hernia education  -Repeat future EGD as needed for dysphagia        MAXI Moctezuma MD      Date: 11/15/2022    Time: 11:14 EST

## 2022-11-15 NOTE — H&P
GI CONSULT  NOTE:    Referring Provider:    Piedad Maloney, TED  [unfilled]    Chief complaint: <principal problem not specified>    Subjective .       Pre op diagnosis  Esophageal stricture [K22.2]  Dysphagia [R13.10]      History of present illness:      Manfred Perry is a 72 y.o. male who presents today for Procedure(s):  ESOPHAGOGASTRODUODENOSCOPY for the indications listed below.     The updated Patient Profile was reviewed prior to the procedure, in conjunction with the Physical Exam, including medical conditions, surgical procedures, medications, allergies, family history and social history.     Pre-operatively, I reviewed the indication(s) for the procedure, the risks of the procedure [including but not limited to: unexpected bleeding possibly requiring hospitalization and/or unplanned repeat procedures, perforation possibly requiring surgical treatment, missed lesions and complications of sedation/MAC (also explained by anesthesia staff)].     I have evaluated the patient for risks associated with the planned anesthesia and the procedure to be performed and find the patient an acceptable candidate for IV sedation.    Multiple opportunities were provided for any questions or concerns, and all questions were answered satisfactorily before any anesthesia was administered. We will proceed with the planned procedure.    Past Medical History:  Past Medical History:   Diagnosis Date   • Disease of thyroid gland    • GERD (gastroesophageal reflux disease)    • Hypertension        Past Surgical History:  Past Surgical History:   Procedure Laterality Date   • APPENDECTOMY     • ENDOSCOPY N/A 8/10/2022    Procedure: EGD WITH DILATION with 42 bougie and 12-15mm balloon to 15mm;  Surgeon: RAJAN Moctezuma MD;  Location: [facility] ENDOSCOPY;  Service: Gastroenterology;  Laterality: N/A;  esophagitis and hiatal hernia       Social History:  Social History     Tobacco Use   • Smoking status: Never   • Smokeless  "tobacco: Never   Vaping Use   • Vaping Use: Never used   Substance Use Topics   • Alcohol use: Never   • Drug use: Never     Comment: tramadol       Family History:  History reviewed. No pertinent family history.    Medications:  Medications Prior to Admission   Medication Sig Dispense Refill Last Dose   • buprenorphine-naloxone (SUBOXONE) 8-2 MG per SL tablet Place 1 tablet under the tongue 2 (Two) Times a Day.   11/14/2022   • hydroCHLOROthiazide (HYDRODIURIL) 25 MG tablet Take 25 mg by mouth Daily.   11/15/2022   • hydrOXYzine (ATARAX) 25 MG tablet Take 1 tablet by mouth Every 6 (Six) Hours As Needed for Anxiety. 16 tablet 0 Past Week   • losartan (COZAAR) 100 MG tablet Take 100 mg by mouth Daily.   11/15/2022   • omeprazole (PrilOSEC) 40 MG capsule Take 1 capsule by mouth Daily. 14 capsule 0 11/14/2022   • ondansetron ODT (ZOFRAN-ODT) 4 MG disintegrating tablet Place 1 tablet on the tongue Every 8 (Eight) Hours As Needed for Nausea or Vomiting. 20 tablet 0 Past Month       Scheduled Meds:  Continuous Infusions:No current facility-administered medications for this encounter.    PRN Meds:.    ALLERGIES:  Patient has no known allergies.    ROS:  The following systems were reviewed and negative;  Constitution:  No fevers, chills, no unintentional weight loss  Skin: no rash, no jaundice  Eyes:  No blurry vision, no eye pain  HENT:  No change in hearing or smell  Resp:  No dyspnea or cough  CV:  No chest pain or palpitations  :  No dysuria, hematuria  Musculoskeletal:  No leg cramps or arthralgias  Neuro:  No tremor, no numbness  Psych:  No depression or confsusion    Objective     Vital Signs:   Vitals:    11/08/22 1503 11/15/22 1048   BP:  154/64   BP Location:  Left arm   Patient Position:  Lying   Pulse:  75   Resp:  16   Temp:  98.2 °F (36.8 °C)   TempSrc:  Oral   SpO2:  98%   Weight: 99.8 kg (220 lb) 98.2 kg (216 lb 7.9 oz)   Height: 172.7 cm (68\") 172.7 cm (67.99\")       Physical Exam:       General " Appearance:    Awake and alert, in no acute distress   Head:    Normocephalic, without obvious abnormality, atraumatic   Throat:   No oral lesions, no thrush, oral mucosa moist   Lungs:     respirations regular, even and unlabored   Skin:   No rash, no jaundice       Results Review:  Lab Results (last 24 hours)     ** No results found for the last 24 hours. **          Imaging Results (Last 24 Hours)     ** No results found for the last 24 hours. **           I reviewed the patient's labs and imaging.    ASSESSMENT AND PLAN:      Active Problems:    * No active hospital problems. *       Procedure(s):  ESOPHAGOGASTRODUODENOSCOPY      I discussed the patient's findings and my recommendations with the patient.    MAXI Moctezuma MD  11/15/22  10:58 EST

## 2022-11-28 NOTE — OP NOTE
ESOPHAGOGASTRODUODENOSCOPY Procedure Report     Patient Name:  Manfred Perry  YOB: 1950     Date of Surgery:  11/15/2022     Pre-Op Diagnosis:  Esophageal stricture [K22.2]  Dysphagia [R13.10]       Post-Op Diagnosis Codes:     * Esophageal stricture [K22.2]     * Dysphagia [R13.10]     Postop diagnosis:  1.  Esophageal stricture  2.  erosive esophagitis  3.  Hiatal hernia     Procedure/CPT® Codes:        Procedure(s):  ESOPHAGOGASTRODUODENOSCOPY WITH DILATION ( BOUGIE 46, )     Staff:  Surgeon(s):  RAJAN Moctezuma MD        Anesthesia: Monitored Anesthesia Care     Description of Procedure:  A description of the procedure as well as risks, benefits and alternative methods were explained to the patient who voiced understanding and signed the corresponding consent form. A physical exam was performed and vital signs were monitored throughout the procedure.     An upper GI endoscope was placed into the mouth and proceeded through the esophagus, stomach and second portion of the duodenum without difficulty. The scope was then retroflexed and the fundus was visualized. The procedure was not difficult and there were no immediate complications.  There was no blood loss.     Impression:  1.  Esophageal stricture at the GE junction approximately 15 mm in diameter, easily traversed prior to dilation.  Dilation was performed with 46 Israeli Todd dilator with moderate resistance and post look endoscopy showed appropriate mucosal splitting consistent with successful dilation  2.  Erosive esophagitis LA class B at the GE junction around the stricture, improved compared to prior  3.  Medium size hiatal hernia  4.  Normal mucosa in the stomach  5.  Pale mucosa in the duodenal bulb and second portion of the duodenum, but normal appearing villi.      Recommendations:  -Repeat EGD in 4 to 6 weeks with repeat dilation, and possibly biopsies for barretts esophagus and pale duodenal mucosa  -Continue PPI daily  -Hiatal  hernia education  -Repeat future EGD as needed for dysphagia        MAXI Moctezuma MD      Date: 11/15/2022    Time: 11:14 EST

## 2022-11-28 NOTE — H&P
GI CONSULT  NOTE:    Referring Provider:    Piedad Maloney, TED  [unfilled]    Chief complaint: <principal problem not specified>    Subjective .       Pre op diagnosis  Esophageal stricture [K22.2]  Dysphagia [R13.10]      History of present illness:      Manfred Perry is a 72 y.o. male who presents today for Procedure(s):  ESOPHAGOGASTRODUODENOSCOPY WITH DILATION ( BOUGIE 46, ) for the indications listed below.     The updated Patient Profile was reviewed prior to the procedure, in conjunction with the Physical Exam, including medical conditions, surgical procedures, medications, allergies, family history and social history.     Pre-operatively, I reviewed the indication(s) for the procedure, the risks of the procedure [including but not limited to: unexpected bleeding possibly requiring hospitalization and/or unplanned repeat procedures, perforation possibly requiring surgical treatment, missed lesions and complications of sedation/MAC (also explained by anesthesia staff)].     I have evaluated the patient for risks associated with the planned anesthesia and the procedure to be performed and find the patient an acceptable candidate for IV sedation.    Multiple opportunities were provided for any questions or concerns, and all questions were answered satisfactorily before any anesthesia was administered. We will proceed with the planned procedure.    Past Medical History:  Past Medical History:   Diagnosis Date   • Disease of thyroid gland    • GERD (gastroesophageal reflux disease)    • Hypertension        Past Surgical History:  Past Surgical History:   Procedure Laterality Date   • APPENDECTOMY     • ENDOSCOPY N/A 8/10/2022    Procedure: EGD WITH DILATION with 42 bougie and 12-15mm balloon to 15mm;  Surgeon: RAJAN Moctezuma MD;  Location: [facility] ENDOSCOPY;  Service: Gastroenterology;  Laterality: N/A;  esophagitis and hiatal hernia   • ENDOSCOPY N/A 11/15/2022    Procedure:  "ESOPHAGOGASTRODUODENOSCOPY WITH DILATION ( BOUGIE 46, );  Surgeon: RAJAN Moctezuma MD;  Location: [facility] ENDOSCOPY;  Service: Gastroenterology;  Laterality: N/A;  HIATIAL HERNIA  GASTRITIS  ESOPHOGEAL STRICTURE       Social History:  Social History     Tobacco Use   • Smoking status: Never   • Smokeless tobacco: Never   Vaping Use   • Vaping Use: Never used   Substance Use Topics   • Alcohol use: Never   • Drug use: Never     Comment: tramadol       Family History:  History reviewed. No pertinent family history.    Medications:  No medications prior to admission.       Scheduled Meds:  Continuous Infusions:No current facility-administered medications for this encounter.    PRN Meds:.    ALLERGIES:  Patient has no known allergies.    ROS:  The following systems were reviewed and negative;  Constitution:  No fevers, chills, no unintentional weight loss  Skin: no rash, no jaundice  Eyes:  No blurry vision, no eye pain  HENT:  No change in hearing or smell  Resp:  No dyspnea or cough  CV:  No chest pain or palpitations  :  No dysuria, hematuria  Musculoskeletal:  No leg cramps or arthralgias  Neuro:  No tremor, no numbness  Psych:  No depression or confsusion    Objective     Vital Signs:   Vitals:    11/15/22 1048 11/15/22 1123 11/15/22 1128 11/15/22 1134   BP: 154/64 139/74 152/86 145/84   BP Location: Left arm      Patient Position: Lying      Pulse: 75 78 76 81   Resp: 16 19 16 12   Temp: 98.2 °F (36.8 °C)      TempSrc: Oral      SpO2: 98% 98% 95% 90%   Weight: 98.2 kg (216 lb 7.9 oz)      Height: 172.7 cm (67.99\")          Physical Exam:       General Appearance:    Awake and alert, in no acute distress   Head:    Normocephalic, without obvious abnormality, atraumatic   Throat:   No oral lesions, no thrush, oral mucosa moist   Lungs:     respirations regular, even and unlabored   Skin:   No rash, no jaundice       Results Review:  Lab Results (last 24 hours)     ** No results found for the last 24 hours. **    "       Imaging Results (Last 24 Hours)     ** No results found for the last 24 hours. **           I reviewed the patient's labs and imaging.    ASSESSMENT AND PLAN:      Active Problems:    * No active hospital problems. *       Procedure(s):  ESOPHAGOGASTRODUODENOSCOPY WITH DILATION ( BOUGIE 46, )      I discussed the patient's findings and my recommendations with the patient.    MAXI Moctezuma MD  11/28/22  08:49 EST

## 2023-01-16 RX ORDER — IBUPROFEN 200 MG
400 TABLET ORAL EVERY 6 HOURS PRN
COMMUNITY
End: 2023-01-19 | Stop reason: HOSPADM

## 2023-01-18 ENCOUNTER — ANESTHESIA EVENT (OUTPATIENT)
Dept: GASTROENTEROLOGY | Facility: HOSPITAL | Age: 73
End: 2023-01-18
Payer: MEDICARE

## 2023-01-18 NOTE — H&P
GI CONSULT  NOTE:    Referring Provider:    Piedad Maloney, TED  [unfilled]    Chief complaint: <principal problem not specified>    Subjective .       Pre op diagnosis  Esophageal stricture      History of present illness:      Manfred Perry is a 72 y.o. male who presents today for Procedure(s):  ESOPHAGOGASTRODUODENOSCOPY for the indications listed below.     The updated Patient Profile was reviewed prior to the procedure, in conjunction with the Physical Exam, including medical conditions, surgical procedures, medications, allergies, family history and social history.     Pre-operatively, I reviewed the indication(s) for the procedure, the risks of the procedure [including but not limited to: unexpected bleeding possibly requiring hospitalization and/or unplanned repeat procedures, perforation possibly requiring surgical treatment, missed lesions and complications of sedation/MAC (also explained by anesthesia staff)].     I have evaluated the patient for risks associated with the planned anesthesia and the procedure to be performed and find the patient an acceptable candidate for IV sedation.    Multiple opportunities were provided for any questions or concerns, and all questions were answered satisfactorily before any anesthesia was administered. We will proceed with the planned procedure.    Past Medical History:  Past Medical History:   Diagnosis Date   • Disease of thyroid gland    • Dysphagia 01/2023   • GERD (gastroesophageal reflux disease)    • Hypertension        Past Surgical History:  Past Surgical History:   Procedure Laterality Date   • APPENDECTOMY     • ENDOSCOPY N/A 8/10/2022    Procedure: EGD WITH DILATION with 42 bougie and 12-15mm balloon to 15mm;  Surgeon: RAJAN Moctezuma MD;  Location: [facility] ENDOSCOPY;  Service: Gastroenterology;  Laterality: N/A;  esophagitis and hiatal hernia   • ENDOSCOPY N/A 11/15/2022    Procedure: ESOPHAGOGASTRODUODENOSCOPY WITH DILATION ( BOUGIE 46, );  " Surgeon: RAJAN Moctezuma MD;  Location: [facility] ENDOSCOPY;  Service: Gastroenterology;  Laterality: N/A;  HIATIAL HERNIA  GASTRITIS  ESOPHOGEAL STRICTURE       Social History:  Social History     Tobacco Use   • Smoking status: Never   • Smokeless tobacco: Never   Vaping Use   • Vaping Use: Never used   Substance Use Topics   • Alcohol use: Never   • Drug use: Never     Comment: tramadol       Family History:  History reviewed. No pertinent family history.    Medications:  No medications prior to admission.       Scheduled Meds:  Continuous Infusions:No current facility-administered medications for this encounter.    PRN Meds:.    ALLERGIES:  Patient has no known allergies.    ROS:  The following systems were reviewed and negative;  Constitution:  No fevers, chills, no unintentional weight loss  Skin: no rash, no jaundice  Eyes:  No blurry vision, no eye pain  HENT:  No change in hearing or smell  Resp:  No dyspnea or cough  CV:  No chest pain or palpitations  :  No dysuria, hematuria  Musculoskeletal:  No leg cramps or arthralgias  Neuro:  No tremor, no numbness  Psych:  No depression or confsusion    Objective     Vital Signs:   Vitals:    01/16/23 0942   Weight: 99.8 kg (220 lb)   Height: 170.2 cm (67\")       Physical Exam:       General Appearance:    Awake and alert, in no acute distress   Head:    Normocephalic, without obvious abnormality, atraumatic   Throat:   No oral lesions, no thrush, oral mucosa moist   Lungs:     respirations regular, even and unlabored   Skin:   No rash, no jaundice       Results Review:  Lab Results (last 24 hours)     ** No results found for the last 24 hours. **          Imaging Results (Last 24 Hours)     ** No results found for the last 24 hours. **           I reviewed the patient's labs and imaging.    ASSESSMENT AND PLAN:      Active Problems:    * No active hospital problems. *       Procedure(s):  ESOPHAGOGASTRODUODENOSCOPY      I discussed the patient's findings and my " recommendations with the patient.    MAXI Moctezuma MD  01/18/23  16:34 EST

## 2023-01-18 NOTE — ANESTHESIA PREPROCEDURE EVALUATION
Anesthesia Evaluation     Patient summary reviewed and Nursing notes reviewed   no history of anesthetic complications:  NPO Solid Status: > 8 hours  NPO Liquid Status: > 8 hours           Airway   Dental      Pulmonary    Cardiovascular     ECG reviewed    (+) hypertension,       Neuro/Psych  GI/Hepatic/Renal/Endo    (+) obesity,  GERD,  thyroid problem     Musculoskeletal     Abdominal    Substance History      OB/GYN          Other        ROS/Med Hx Other: Suboxone use    PSH  APPENDECTOMY ENDOSCOPY  ENDOSCOPY                   Anesthesia Plan    ASA 3     MAC     (Patient identified; pre-operative vital signs, all relevant labs/studies, complete medical/surgical/anesthetic history, full medication list, full allergy list, and NPO status obtained/reviewed; physical assessment performed; anesthetic options, side effects, potential complications, risks, and benefits discussed; questions answered; written anesthesia consent obtained; patient cleared for procedure; anesthesia machine and equipment checked and functioning)    Anesthetic plan, risks, benefits, and alternatives have been provided, discussed and informed consent has been obtained with: patient.        CODE STATUS:

## 2023-01-19 ENCOUNTER — ANESTHESIA (OUTPATIENT)
Dept: GASTROENTEROLOGY | Facility: HOSPITAL | Age: 73
End: 2023-01-19
Payer: MEDICARE

## 2023-01-19 ENCOUNTER — ON CAMPUS - OUTPATIENT (AMBULATORY)
Dept: URBAN - METROPOLITAN AREA HOSPITAL 85 | Facility: HOSPITAL | Age: 73
End: 2023-01-19
Payer: COMMERCIAL

## 2023-01-19 ENCOUNTER — HOSPITAL ENCOUNTER (OUTPATIENT)
Facility: HOSPITAL | Age: 73
Setting detail: HOSPITAL OUTPATIENT SURGERY
Discharge: HOME | End: 2023-01-19
Attending: INTERNAL MEDICINE | Admitting: INTERNAL MEDICINE
Payer: MEDICARE

## 2023-01-19 VITALS
RESPIRATION RATE: 12 BRPM | TEMPERATURE: 98.2 F | BODY MASS INDEX: 41.7 KG/M2 | HEIGHT: 67 IN | HEART RATE: 67 BPM | WEIGHT: 265.65 LBS | SYSTOLIC BLOOD PRESSURE: 150 MMHG | OXYGEN SATURATION: 96 % | DIASTOLIC BLOOD PRESSURE: 77 MMHG

## 2023-01-19 DIAGNOSIS — K29.80 DUODENITIS WITHOUT BLEEDING: ICD-10-CM

## 2023-01-19 DIAGNOSIS — K22.70 BARRETT'S ESOPHAGUS WITHOUT DYSPLASIA: ICD-10-CM

## 2023-01-19 DIAGNOSIS — K44.9 DIAPHRAGMATIC HERNIA WITHOUT OBSTRUCTION OR GANGRENE: ICD-10-CM

## 2023-01-19 DIAGNOSIS — K22.2 ESOPHAGEAL OBSTRUCTION: ICD-10-CM

## 2023-01-19 DIAGNOSIS — K29.60 OTHER GASTRITIS WITHOUT BLEEDING: ICD-10-CM

## 2023-01-19 DIAGNOSIS — K21.00 GASTRO-ESOPHAGEAL REFLUX DISEASE WITH ESOPHAGITIS, WITHOUT B: ICD-10-CM

## 2023-01-19 DIAGNOSIS — K22.2 ESOPHAGEAL STRICTURE: ICD-10-CM

## 2023-01-19 PROCEDURE — 88312 SPECIAL STAINS GROUP 1: CPT | Performed by: INTERNAL MEDICINE

## 2023-01-19 PROCEDURE — 43450 DILATE ESOPHAGUS 1/MULT PASS: CPT | Mod: 59 | Performed by: INTERNAL MEDICINE

## 2023-01-19 PROCEDURE — 88305 TISSUE EXAM BY PATHOLOGIST: CPT | Performed by: INTERNAL MEDICINE

## 2023-01-19 PROCEDURE — 25010000002 PROPOFOL 200 MG/20ML EMULSION: Performed by: ANESTHESIOLOGY

## 2023-01-19 PROCEDURE — 43239 EGD BIOPSY SINGLE/MULTIPLE: CPT | Performed by: INTERNAL MEDICINE

## 2023-01-19 RX ORDER — LIDOCAINE HYDROCHLORIDE 20 MG/ML
INJECTION, SOLUTION INFILTRATION; PERINEURAL AS NEEDED
Status: DISCONTINUED | OUTPATIENT
Start: 2023-01-19 | End: 2023-01-19 | Stop reason: SURG

## 2023-01-19 RX ORDER — SODIUM CHLORIDE 9 MG/ML
INJECTION, SOLUTION INTRAVENOUS CONTINUOUS PRN
Status: DISCONTINUED | OUTPATIENT
Start: 2023-01-19 | End: 2023-01-19 | Stop reason: SURG

## 2023-01-19 RX ORDER — PROPOFOL 10 MG/ML
INJECTION, EMULSION INTRAVENOUS AS NEEDED
Status: DISCONTINUED | OUTPATIENT
Start: 2023-01-19 | End: 2023-01-19 | Stop reason: SURG

## 2023-01-19 RX ADMIN — LIDOCAINE HYDROCHLORIDE 50 MG: 20 INJECTION, SOLUTION INFILTRATION; PERINEURAL at 10:03

## 2023-01-19 RX ADMIN — SODIUM CHLORIDE: 0.9 INJECTION, SOLUTION INTRAVENOUS at 09:54

## 2023-01-19 RX ADMIN — PROPOFOL 250 MG: 10 INJECTION, EMULSION INTRAVENOUS at 10:03

## 2023-01-19 NOTE — OP NOTE
ESOPHAGOGASTRODUODENOSCOPY Procedure Report    Patient Name:  Manfred Perry  YOB: 1950    Date of Surgery:  1/19/2023     Pre-Op Diagnosis:  Esophageal stricture [K22.2]       Post-Op Diagnosis Codes:     * Esophageal stricture [K22.2]    Postop diagnosis:  1.  Esophageal stricture  2.  Hiatal hernia  3.  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  Erosive gastritis  5.  Abnormal duodenal mucosa    Procedure/CPT® Codes:      Procedure(s):  ESOPHAGOGASTRODUODENOSCOPY with esophageal dilation (bougie 50, 52) and biopsy x 3 areas    Staff:  Surgeon(s):  RAJAN Moctezuma MD      Anesthesia: Monitored Anesthesia Care    Description of Procedure:  A description of the procedure as well as risks, benefits and alternative methods were explained to the patient who voiced understanding and signed the corresponding consent form. A physical exam was performed and vital signs were monitored throughout the procedure.    An upper GI endoscope was placed into the mouth and proceeded through the esophagus, stomach and second portion of the duodenum without difficulty. The scope was then retroflexed and the fundus was visualized. The procedure was not difficult and there were no immediate complications.  There was no blood loss.    Impression:  1.  In the lower third of the esophagus near the GE junction there was narrowing consistent with esophageal stricture.  Dilation was performed with nonguided bougie Todd dilator and progressively increased in diameter from 50 [place] to 52 [place] successfully with moderate resistance and post look endoscopy showed appropriate mucosal splitting consistent with successful dilation.  2.  Medium size hiatal hernia  3.  Abnormal mucosa in the lower esophagus with salmon-colored mucosa above the upper extent of the gastric folds consistent with Monroy's esophagus.  Cold forceps biopsies were obtained for histology for Monroy's, and to rule out  dysplasia  4.  Patchy erythema and erosions in the lower esophagus consistent with esophagitis  5.  Patchy erythema in the antrum consistent with gastritis.  Biopsies obtained with cold forceps from stomach antrum and body to rule out H. pylori next   6.  Pale mucosa in the duodenal bulb and second portion of duodenum.  Biopsies were obtained with cold forceps for histology and to rule out celiac    Recommendations:  -Continue PPI  -Follow-up gastric biopsies for H. pylori, treat with quadruple therapy if positive  -Repeat EGD as needed for dysphagia  -Hiatal hernia education  -Recall for EGD in 3 years for nondysplastic Monroy's esophagus      MAXI Moctezuma MD     Date: 1/19/2023    Time: 10:38 EST

## 2023-01-19 NOTE — ANESTHESIA POSTPROCEDURE EVALUATION
Patient: Manfred Perry    Procedure Summary     Date: 01/19/23 Room / Location: [facility]COPY 1 / [facility]    Anesthesia Start: 0953 Anesthesia Stop: 1022    Procedure: ESOPHAGOGASTRODUODENOSCOPY with esophageal dilation (bougie 50, 52) and biopsy x 3 areas Diagnosis:       Esophageal stricture      (Esophageal stricture [K22.2])    Surgeons: RAJAN Moctezuma MD Provider: Tristin Rodríguez MD    Anesthesia Type: MAC ASA Status: 3          Anesthesia Type: MAC    Vitals  Vitals Value Taken Time   BP     Temp     Pulse 79 01/19/23 1022   Resp     SpO2 98 % 01/19/23 1022   Vitals shown include unvalidated device data.        Post Anesthesia Care and Evaluation    Patient location during evaluation: PACU  Patient participation: complete - patient participated  Level of consciousness: awake  Pain scale: See nurse's notes for pain score.  Pain management: adequate    Airway patency: patent  Anesthetic complications: No anesthetic complications  PONV Status: none  Cardiovascular status: acceptable  Respiratory status: acceptable and spontaneous ventilation  Hydration status: acceptable    Comments: Patient seen and examined postoperatively; vital signs stable; SpO2 greater than or equal to 90%; cardiopulmonary status stable; nausea/vomiting adequately controlled; pain adequately controlled; no apparent anesthesia complications; patient discharged from anesthesia care when discharge criteria were met

## 2023-01-20 LAB
LAB AP CASE REPORT: NORMAL
LAB AP DIAGNOSIS COMMENT: NORMAL
PATH REPORT.FINAL DX SPEC: NORMAL
PATH REPORT.GROSS SPEC: NORMAL

## 2023-04-08 ENCOUNTER — APPOINTMENT (OUTPATIENT)
Dept: GENERAL RADIOLOGY | Facility: HOSPITAL | Age: 73
DRG: 247 | End: 2023-04-08
Payer: MEDICARE

## 2023-04-08 ENCOUNTER — HOSPITAL ENCOUNTER (INPATIENT)
Facility: HOSPITAL | Age: 73
LOS: 1 days | Discharge: HOME | DRG: 247 | End: 2023-04-11
Attending: EMERGENCY MEDICINE | Admitting: STUDENT IN AN ORGANIZED HEALTH CARE EDUCATION/TRAINING PROGRAM
Payer: MEDICARE

## 2023-04-08 DIAGNOSIS — I47.1 SVT (SUPRAVENTRICULAR TACHYCARDIA): ICD-10-CM

## 2023-04-08 DIAGNOSIS — R07.9 CHEST PAIN, UNSPECIFIED TYPE: Primary | ICD-10-CM

## 2023-04-08 LAB
ALBUMIN SERPL-MCNC: 4.2 G/DL (ref 3.5–5.2)
ALBUMIN/GLOB SERPL: 1 G/DL
ALP SERPL-CCNC: 101 U/L (ref 39–117)
ALT SERPL W P-5'-P-CCNC: 15 U/L (ref 1–41)
ANION GAP SERPL CALCULATED.3IONS-SCNC: 18 MMOL/L (ref 5–15)
APTT PPP: 32.2 SECONDS (ref 61–76.5)
AST SERPL-CCNC: 31 U/L (ref 1–40)
BASOPHILS # BLD AUTO: 0.1 10*3/MM3 (ref 0–0.2)
BASOPHILS NFR BLD AUTO: 1 % (ref 0–1.5)
BILIRUB SERPL-MCNC: 0.6 MG/DL (ref 0–1.2)
BUN SERPL-MCNC: 17 MG/DL (ref 8–23)
BUN/CREAT SERPL: 17.2 (ref 7–25)
CALCIUM SPEC-SCNC: 9.1 MG/DL (ref 8.6–10.5)
CHLORIDE SERPL-SCNC: 97 MMOL/L (ref 98–107)
CO2 SERPL-SCNC: 23 MMOL/L (ref 22–29)
CREAT SERPL-MCNC: 0.99 MG/DL (ref 0.76–1.27)
DEPRECATED RDW RBC AUTO: 42.9 FL (ref 37–54)
EGFRCR SERPLBLD CKD-EPI 2021: 80.4 ML/MIN/1.73
EOSINOPHIL # BLD AUTO: 0.1 10*3/MM3 (ref 0–0.4)
EOSINOPHIL NFR BLD AUTO: 1.3 % (ref 0.3–6.2)
ERYTHROCYTE [DISTWIDTH] IN BLOOD BY AUTOMATED COUNT: 13.1 % (ref 12.3–15.4)
GLOBULIN UR ELPH-MCNC: 4.1 GM/DL
GLUCOSE SERPL-MCNC: 220 MG/DL (ref 65–99)
HCT VFR BLD AUTO: 38.2 % (ref 37.5–51)
HGB BLD-MCNC: 13 G/DL (ref 13–17.7)
INR PPP: 1 (ref 0.93–1.1)
LYMPHOCYTES # BLD AUTO: 1.6 10*3/MM3 (ref 0.7–3.1)
LYMPHOCYTES NFR BLD AUTO: 13.9 % (ref 19.6–45.3)
MAGNESIUM SERPL-MCNC: 1.3 MG/DL (ref 1.6–2.4)
MCH RBC QN AUTO: 30.3 PG (ref 26.6–33)
MCHC RBC AUTO-ENTMCNC: 34.2 G/DL (ref 31.5–35.7)
MCV RBC AUTO: 88.8 FL (ref 79–97)
MONOCYTES # BLD AUTO: 1.1 10*3/MM3 (ref 0.1–0.9)
MONOCYTES NFR BLD AUTO: 9.2 % (ref 5–12)
NEUTROPHILS NFR BLD AUTO: 74.6 % (ref 42.7–76)
NEUTROPHILS NFR BLD AUTO: 8.8 10*3/MM3 (ref 1.7–7)
NRBC BLD AUTO-RTO: 0.1 /100 WBC (ref 0–0.2)
NT-PROBNP SERPL-MCNC: 143 PG/ML (ref 0–900)
PLATELET # BLD AUTO: 256 10*3/MM3 (ref 140–450)
PMV BLD AUTO: 9.1 FL (ref 6–12)
POTASSIUM SERPL-SCNC: 3 MMOL/L (ref 3.5–5.2)
PROT SERPL-MCNC: 8.3 G/DL (ref 6–8.5)
PROTHROMBIN TIME: 10.3 SECONDS (ref 9.6–11.7)
RBC # BLD AUTO: 4.3 10*6/MM3 (ref 4.14–5.8)
SODIUM SERPL-SCNC: 138 MMOL/L (ref 136–145)
TROPONIN T SERPL HS-MCNC: 22 NG/L
WBC NRBC COR # BLD: 11.8 10*3/MM3 (ref 3.4–10.8)
WHOLE BLOOD HOLD COAG: NORMAL
WHOLE BLOOD HOLD SPECIMEN: NORMAL

## 2023-04-08 PROCEDURE — 83735 ASSAY OF MAGNESIUM: CPT | Performed by: NURSE PRACTITIONER

## 2023-04-08 PROCEDURE — 85025 COMPLETE CBC W/AUTO DIFF WBC: CPT | Performed by: EMERGENCY MEDICINE

## 2023-04-08 PROCEDURE — 85730 THROMBOPLASTIN TIME PARTIAL: CPT | Performed by: EMERGENCY MEDICINE

## 2023-04-08 PROCEDURE — 84484 ASSAY OF TROPONIN QUANT: CPT | Performed by: EMERGENCY MEDICINE

## 2023-04-08 PROCEDURE — 85610 PROTHROMBIN TIME: CPT | Performed by: EMERGENCY MEDICINE

## 2023-04-08 PROCEDURE — 25010000002 ADENOSINE PER 6 MG

## 2023-04-08 PROCEDURE — 93005 ELECTROCARDIOGRAM TRACING: CPT

## 2023-04-08 PROCEDURE — 99285 EMERGENCY DEPT VISIT HI MDM: CPT

## 2023-04-08 PROCEDURE — 80053 COMPREHEN METABOLIC PANEL: CPT | Performed by: EMERGENCY MEDICINE

## 2023-04-08 PROCEDURE — 83880 ASSAY OF NATRIURETIC PEPTIDE: CPT | Performed by: EMERGENCY MEDICINE

## 2023-04-08 PROCEDURE — 71045 X-RAY EXAM CHEST 1 VIEW: CPT

## 2023-04-08 PROCEDURE — G0378 HOSPITAL OBSERVATION PER HR: HCPCS

## 2023-04-08 PROCEDURE — 93005 ELECTROCARDIOGRAM TRACING: CPT | Performed by: EMERGENCY MEDICINE

## 2023-04-08 RX ORDER — SODIUM CHLORIDE 9 MG/ML
40 INJECTION, SOLUTION INTRAVENOUS AS NEEDED
Status: DISCONTINUED | OUTPATIENT
Start: 2023-04-08 | End: 2023-04-11 | Stop reason: HOSPADM

## 2023-04-08 RX ORDER — NITROGLYCERIN 0.4 MG/1
0.4 TABLET SUBLINGUAL
Status: DISCONTINUED | OUTPATIENT
Start: 2023-04-08 | End: 2023-04-11 | Stop reason: HOSPADM

## 2023-04-08 RX ORDER — METOPROLOL TARTRATE 5 MG/5ML
INJECTION INTRAVENOUS
Status: COMPLETED
Start: 2023-04-08 | End: 2023-04-08

## 2023-04-08 RX ORDER — ACETAMINOPHEN 650 MG/1
650 SUPPOSITORY RECTAL EVERY 4 HOURS PRN
Status: DISCONTINUED | OUTPATIENT
Start: 2023-04-08 | End: 2023-04-11 | Stop reason: HOSPADM

## 2023-04-08 RX ORDER — ACETAMINOPHEN 160 MG/5ML
650 SOLUTION ORAL EVERY 4 HOURS PRN
Status: DISCONTINUED | OUTPATIENT
Start: 2023-04-08 | End: 2023-04-11 | Stop reason: HOSPADM

## 2023-04-08 RX ORDER — KETOROLAC TROMETHAMINE 15 MG/ML
15 INJECTION, SOLUTION INTRAMUSCULAR; INTRAVENOUS EVERY 6 HOURS PRN
Status: ACTIVE | OUTPATIENT
Start: 2023-04-08 | End: 2023-04-10

## 2023-04-08 RX ORDER — METOPROLOL TARTRATE 5 MG/5ML
5 INJECTION INTRAVENOUS ONCE
Status: COMPLETED | OUTPATIENT
Start: 2023-04-08 | End: 2023-04-08

## 2023-04-08 RX ORDER — ADENOSINE 3 MG/ML
INJECTION, SOLUTION INTRAVENOUS
Status: DISPENSED
Start: 2023-04-08 | End: 2023-04-09

## 2023-04-08 RX ORDER — ONDANSETRON 2 MG/ML
4 INJECTION INTRAMUSCULAR; INTRAVENOUS EVERY 6 HOURS PRN
Status: DISCONTINUED | OUTPATIENT
Start: 2023-04-08 | End: 2023-04-11 | Stop reason: HOSPADM

## 2023-04-08 RX ORDER — POTASSIUM CHLORIDE 20 MEQ/1
40 TABLET, EXTENDED RELEASE ORAL ONCE
Status: COMPLETED | OUTPATIENT
Start: 2023-04-08 | End: 2023-04-08

## 2023-04-08 RX ORDER — SODIUM CHLORIDE 0.9 % (FLUSH) 0.9 %
10 SYRINGE (ML) INJECTION EVERY 12 HOURS SCHEDULED
Status: DISCONTINUED | OUTPATIENT
Start: 2023-04-08 | End: 2023-04-11 | Stop reason: HOSPADM

## 2023-04-08 RX ORDER — METOPROLOL TARTRATE 5 MG/5ML
5 INJECTION INTRAVENOUS ONCE
Status: DISCONTINUED | OUTPATIENT
Start: 2023-04-08 | End: 2023-04-08

## 2023-04-08 RX ORDER — ACETAMINOPHEN 325 MG/1
650 TABLET ORAL EVERY 4 HOURS PRN
Status: DISCONTINUED | OUTPATIENT
Start: 2023-04-08 | End: 2023-04-11 | Stop reason: HOSPADM

## 2023-04-08 RX ORDER — ALUMINA, MAGNESIA, AND SIMETHICONE 2400; 2400; 240 MG/30ML; MG/30ML; MG/30ML
15 SUSPENSION ORAL EVERY 6 HOURS PRN
Status: DISCONTINUED | OUTPATIENT
Start: 2023-04-08 | End: 2023-04-11 | Stop reason: HOSPADM

## 2023-04-08 RX ORDER — CHOLECALCIFEROL (VITAMIN D3) 125 MCG
5 CAPSULE ORAL NIGHTLY PRN
Status: DISCONTINUED | OUTPATIENT
Start: 2023-04-08 | End: 2023-04-11 | Stop reason: HOSPADM

## 2023-04-08 RX ORDER — ADENOSINE 3 MG/ML
INJECTION, SOLUTION INTRAVENOUS
Status: COMPLETED
Start: 2023-04-08 | End: 2023-04-08

## 2023-04-08 RX ORDER — MAGNESIUM SULFATE HEPTAHYDRATE 40 MG/ML
2 INJECTION, SOLUTION INTRAVENOUS ONCE
Status: COMPLETED | OUTPATIENT
Start: 2023-04-08 | End: 2023-04-09

## 2023-04-08 RX ORDER — SODIUM CHLORIDE 0.9 % (FLUSH) 0.9 %
10 SYRINGE (ML) INJECTION AS NEEDED
Status: DISCONTINUED | OUTPATIENT
Start: 2023-04-08 | End: 2023-04-11 | Stop reason: HOSPADM

## 2023-04-08 RX ORDER — ASPIRIN 325 MG
325 TABLET ORAL ONCE
Status: COMPLETED | OUTPATIENT
Start: 2023-04-08 | End: 2023-04-08

## 2023-04-08 RX ORDER — ADENOSINE 3 MG/ML
12 INJECTION, SOLUTION INTRAVENOUS ONCE
Status: COMPLETED | OUTPATIENT
Start: 2023-04-08 | End: 2023-04-08

## 2023-04-08 RX ORDER — ONDANSETRON 4 MG/1
4 TABLET, FILM COATED ORAL EVERY 6 HOURS PRN
Status: DISCONTINUED | OUTPATIENT
Start: 2023-04-08 | End: 2023-04-11 | Stop reason: HOSPADM

## 2023-04-08 RX ADMIN — ADENOSINE 12 MG: 3 INJECTION, SOLUTION INTRAVENOUS at 21:40

## 2023-04-08 RX ADMIN — METOPROLOL TARTRATE 5 MG: 1 INJECTION, SOLUTION INTRAVENOUS at 21:41

## 2023-04-08 RX ADMIN — ADENOSINE 12 MG: 3 INJECTION, SOLUTION INTRAVENOUS at 21:33

## 2023-04-08 RX ADMIN — ASPIRIN 325 MG: 325 TABLET ORAL at 21:44

## 2023-04-08 RX ADMIN — METOPROLOL TARTRATE 5 MG: 5 INJECTION INTRAVENOUS at 21:41

## 2023-04-08 RX ADMIN — POTASSIUM CHLORIDE 40 MEQ: 1500 TABLET, EXTENDED RELEASE ORAL at 22:49

## 2023-04-08 RX ADMIN — NITROGLYCERIN 1 INCH: 20 OINTMENT TOPICAL at 21:44

## 2023-04-08 RX ADMIN — ADENOSINE 12 MG: 3 INJECTION INTRAVENOUS at 21:40

## 2023-04-08 RX ADMIN — ADENOSINE 12 MG: 3 INJECTION INTRAVENOUS at 21:33

## 2023-04-08 NOTE — Clinical Note
Level of Care: Telemetry [5]   Admitting Physician: SILVIA LUDWIG [[id number]]   Attending Physician: SILVIA LUDWIG [[id number]]

## 2023-04-08 NOTE — Clinical Note
Transparent Dressing was used to secure the sheath post procedure.  Pressure Bag was used to stabalize the sheath post procedure.

## 2023-04-08 NOTE — Clinical Note
The left DP pulse is +2. The right DP pulse is +2. The left PT pulse is +2. The right PT pulse is +1.

## 2023-04-09 PROBLEM — I10 HYPERTENSION: Status: ACTIVE | Noted: 2017-11-29

## 2023-04-09 PROBLEM — E87.6 HYPOKALEMIA: Status: ACTIVE | Noted: 2023-04-09

## 2023-04-09 PROBLEM — G89.29 CHRONIC PAIN: Status: ACTIVE | Noted: 2023-04-09

## 2023-04-09 PROBLEM — E83.42 HYPOMAGNESEMIA: Status: ACTIVE | Noted: 2023-04-09

## 2023-04-09 PROBLEM — G62.9 NEUROPATHY: Status: ACTIVE | Noted: 2023-04-09

## 2023-04-09 PROBLEM — F41.9 ANXIETY: Status: ACTIVE | Noted: 2017-11-29

## 2023-04-09 LAB
ANION GAP SERPL CALCULATED.3IONS-SCNC: 10 MMOL/L (ref 5–15)
BASOPHILS # BLD AUTO: 0.1 10*3/MM3 (ref 0–0.2)
BASOPHILS NFR BLD AUTO: 1 % (ref 0–1.5)
BUN SERPL-MCNC: 14 MG/DL (ref 8–23)
BUN/CREAT SERPL: 16.5 (ref 7–25)
CALCIUM SPEC-SCNC: 8.9 MG/DL (ref 8.6–10.5)
CHLORIDE SERPL-SCNC: 98 MMOL/L (ref 98–107)
CHOLEST SERPL-MCNC: 198 MG/DL (ref 0–200)
CO2 SERPL-SCNC: 28 MMOL/L (ref 22–29)
CREAT SERPL-MCNC: 0.85 MG/DL (ref 0.76–1.27)
DEPRECATED RDW RBC AUTO: 42 FL (ref 37–54)
EGFRCR SERPLBLD CKD-EPI 2021: 91.8 ML/MIN/1.73
EOSINOPHIL # BLD AUTO: 0.2 10*3/MM3 (ref 0–0.4)
EOSINOPHIL NFR BLD AUTO: 1.6 % (ref 0.3–6.2)
ERYTHROCYTE [DISTWIDTH] IN BLOOD BY AUTOMATED COUNT: 12.8 % (ref 12.3–15.4)
GEN 5 2HR TROPONIN T REFLEX: 115 NG/L
GLUCOSE BLDC GLUCOMTR-MCNC: 136 MG/DL (ref 70–105)
GLUCOSE BLDC GLUCOMTR-MCNC: 164 MG/DL (ref 70–105)
GLUCOSE BLDC GLUCOMTR-MCNC: 210 MG/DL (ref 70–105)
GLUCOSE SERPL-MCNC: 181 MG/DL (ref 65–99)
HBA1C MFR BLD: 7.4 % (ref 4.8–5.6)
HCT VFR BLD AUTO: 33.7 % (ref 37.5–51)
HDLC SERPL-MCNC: 39 MG/DL (ref 40–60)
HGB BLD-MCNC: 11.3 G/DL (ref 13–17.7)
LDLC SERPL CALC-MCNC: 136 MG/DL (ref 0–100)
LDLC/HDLC SERPL: 3.42 {RATIO}
LYMPHOCYTES # BLD AUTO: 1.9 10*3/MM3 (ref 0.7–3.1)
LYMPHOCYTES NFR BLD AUTO: 16.3 % (ref 19.6–45.3)
MCH RBC QN AUTO: 29.8 PG (ref 26.6–33)
MCHC RBC AUTO-ENTMCNC: 33.4 G/DL (ref 31.5–35.7)
MCV RBC AUTO: 89.1 FL (ref 79–97)
MONOCYTES # BLD AUTO: 1 10*3/MM3 (ref 0.1–0.9)
MONOCYTES NFR BLD AUTO: 8.6 % (ref 5–12)
NEUTROPHILS NFR BLD AUTO: 72.5 % (ref 42.7–76)
NEUTROPHILS NFR BLD AUTO: 8.5 10*3/MM3 (ref 1.7–7)
NRBC BLD AUTO-RTO: 0 /100 WBC (ref 0–0.2)
PLATELET # BLD AUTO: 223 10*3/MM3 (ref 140–450)
PMV BLD AUTO: 8.5 FL (ref 6–12)
POTASSIUM SERPL-SCNC: 4.2 MMOL/L (ref 3.5–5.2)
POTASSIUM SERPL-SCNC: 4.5 MMOL/L (ref 3.5–5.2)
QT INTERVAL: 298 MS
QT INTERVAL: 420 MS
RBC # BLD AUTO: 3.79 10*6/MM3 (ref 4.14–5.8)
SODIUM SERPL-SCNC: 136 MMOL/L (ref 136–145)
TRIGL SERPL-MCNC: 128 MG/DL (ref 0–150)
TROPONIN T DELTA: 93 NG/L
TSH SERPL DL<=0.05 MIU/L-ACNC: 12.1 UIU/ML (ref 0.27–4.2)
VLDLC SERPL-MCNC: 23 MG/DL (ref 5–40)
WBC NRBC COR # BLD: 11.8 10*3/MM3 (ref 3.4–10.8)

## 2023-04-09 PROCEDURE — 83036 HEMOGLOBIN GLYCOSYLATED A1C: CPT | Performed by: INTERNAL MEDICINE

## 2023-04-09 PROCEDURE — G0378 HOSPITAL OBSERVATION PER HR: HCPCS

## 2023-04-09 PROCEDURE — 25010000002 MAGNESIUM SULFATE 2 GM/50ML SOLUTION: Performed by: NURSE PRACTITIONER

## 2023-04-09 PROCEDURE — 80061 LIPID PANEL: CPT | Performed by: INTERNAL MEDICINE

## 2023-04-09 PROCEDURE — 84132 ASSAY OF SERUM POTASSIUM: CPT | Performed by: INTERNAL MEDICINE

## 2023-04-09 PROCEDURE — 99232 SBSQ HOSP IP/OBS MODERATE 35: CPT | Performed by: INTERNAL MEDICINE

## 2023-04-09 PROCEDURE — 82962 GLUCOSE BLOOD TEST: CPT

## 2023-04-09 PROCEDURE — 84443 ASSAY THYROID STIM HORMONE: CPT | Performed by: INTERNAL MEDICINE

## 2023-04-09 PROCEDURE — 25010000002 MAGNESIUM SULFATE 2 GM/50ML SOLUTION: Performed by: INTERNAL MEDICINE

## 2023-04-09 PROCEDURE — 80048 BASIC METABOLIC PNL TOTAL CA: CPT | Performed by: INTERNAL MEDICINE

## 2023-04-09 PROCEDURE — 85025 COMPLETE CBC W/AUTO DIFF WBC: CPT | Performed by: INTERNAL MEDICINE

## 2023-04-09 RX ORDER — POTASSIUM CHLORIDE 20 MEQ/1
40 TABLET, EXTENDED RELEASE ORAL AS NEEDED
Status: DISCONTINUED | OUTPATIENT
Start: 2023-04-09 | End: 2023-04-11 | Stop reason: HOSPADM

## 2023-04-09 RX ORDER — POTASSIUM CHLORIDE 1.5 G/1.77G
40 POWDER, FOR SOLUTION ORAL AS NEEDED
Status: DISCONTINUED | OUTPATIENT
Start: 2023-04-09 | End: 2023-04-11 | Stop reason: HOSPADM

## 2023-04-09 RX ORDER — MAGNESIUM SULFATE HEPTAHYDRATE 40 MG/ML
2 INJECTION, SOLUTION INTRAVENOUS AS NEEDED
Status: DISCONTINUED | OUTPATIENT
Start: 2023-04-09 | End: 2023-04-11 | Stop reason: HOSPADM

## 2023-04-09 RX ORDER — CLONIDINE HYDROCHLORIDE 0.1 MG/1
0.1 TABLET ORAL 2 TIMES DAILY
Status: DISCONTINUED | OUTPATIENT
Start: 2023-04-09 | End: 2023-04-11

## 2023-04-09 RX ORDER — HYDROXYZINE HYDROCHLORIDE 25 MG/1
25 TABLET, FILM COATED ORAL EVERY 6 HOURS PRN
Status: DISCONTINUED | OUTPATIENT
Start: 2023-04-09 | End: 2023-04-11 | Stop reason: HOSPADM

## 2023-04-09 RX ORDER — CLONIDINE HYDROCHLORIDE 0.1 MG/1
0.1 TABLET ORAL 2 TIMES DAILY
COMMUNITY
End: 2023-04-11 | Stop reason: HOSPADM

## 2023-04-09 RX ORDER — HYDROCHLOROTHIAZIDE 25 MG/1
25 TABLET ORAL DAILY
Status: DISCONTINUED | OUTPATIENT
Start: 2023-04-09 | End: 2023-04-11 | Stop reason: HOSPADM

## 2023-04-09 RX ORDER — BUPRENORPHINE HYDROCHLORIDE AND NALOXONE HYDROCHLORIDE DIHYDRATE 8; 2 MG/1; MG/1
1 TABLET SUBLINGUAL 2 TIMES DAILY
Status: DISCONTINUED | OUTPATIENT
Start: 2023-04-09 | End: 2023-04-09

## 2023-04-09 RX ORDER — BUPRENORPHINE HYDROCHLORIDE AND NALOXONE HYDROCHLORIDE DIHYDRATE 8; 2 MG/1; MG/1
1 TABLET SUBLINGUAL 2 TIMES DAILY
Status: DISCONTINUED | OUTPATIENT
Start: 2023-04-09 | End: 2023-04-11 | Stop reason: HOSPADM

## 2023-04-09 RX ORDER — HYDROXYZINE HYDROCHLORIDE 25 MG/1
25 TABLET, FILM COATED ORAL 3 TIMES DAILY PRN
Status: DISCONTINUED | OUTPATIENT
Start: 2023-04-09 | End: 2023-04-09

## 2023-04-09 RX ORDER — MAGNESIUM SULFATE HEPTAHYDRATE 40 MG/ML
4 INJECTION, SOLUTION INTRAVENOUS AS NEEDED
Status: DISCONTINUED | OUTPATIENT
Start: 2023-04-09 | End: 2023-04-11 | Stop reason: HOSPADM

## 2023-04-09 RX ORDER — POTASSIUM CHLORIDE 7.45 MG/ML
10 INJECTION INTRAVENOUS
Status: DISCONTINUED | OUTPATIENT
Start: 2023-04-09 | End: 2023-04-11 | Stop reason: HOSPADM

## 2023-04-09 RX ORDER — PANTOPRAZOLE SODIUM 40 MG/1
40 TABLET, DELAYED RELEASE ORAL
Status: DISCONTINUED | OUTPATIENT
Start: 2023-04-09 | End: 2023-04-11 | Stop reason: HOSPADM

## 2023-04-09 RX ORDER — LOSARTAN POTASSIUM 50 MG/1
100 TABLET ORAL DAILY
Status: DISCONTINUED | OUTPATIENT
Start: 2023-04-09 | End: 2023-04-11 | Stop reason: HOSPADM

## 2023-04-09 RX ADMIN — NITROGLYCERIN 1 INCH: 20 OINTMENT TOPICAL at 13:00

## 2023-04-09 RX ADMIN — BUPRENORPHINE HYDROCHLORIDE AND NALOXONE HYDROCHLORIDE DIHYDRATE 1 TABLET: 8; 2 TABLET SUBLINGUAL at 18:08

## 2023-04-09 RX ADMIN — MAGNESIUM SULFATE HEPTAHYDRATE 2 G: 2 INJECTION, SOLUTION INTRAVENOUS at 12:38

## 2023-04-09 RX ADMIN — CLONIDINE HYDROCHLORIDE 0.1 MG: 0.1 TABLET ORAL at 12:08

## 2023-04-09 RX ADMIN — LOSARTAN POTASSIUM 100 MG: 50 TABLET, FILM COATED ORAL at 12:08

## 2023-04-09 RX ADMIN — Medication 10 ML: at 23:12

## 2023-04-09 RX ADMIN — MAGNESIUM SULFATE HEPTAHYDRATE 2 G: 2 INJECTION, SOLUTION INTRAVENOUS at 00:35

## 2023-04-09 RX ADMIN — HYDROXYZINE HYDROCHLORIDE 25 MG: 25 TABLET, FILM COATED ORAL at 01:46

## 2023-04-09 RX ADMIN — POTASSIUM CHLORIDE 40 MEQ: 1500 TABLET, EXTENDED RELEASE ORAL at 08:48

## 2023-04-09 RX ADMIN — MAGNESIUM SULFATE HEPTAHYDRATE 2 G: 2 INJECTION, SOLUTION INTRAVENOUS at 08:49

## 2023-04-09 RX ADMIN — CLONIDINE HYDROCHLORIDE 0.1 MG: 0.1 TABLET ORAL at 23:06

## 2023-04-09 RX ADMIN — PANTOPRAZOLE SODIUM 40 MG: 40 TABLET, DELAYED RELEASE ORAL at 05:36

## 2023-04-09 RX ADMIN — Medication 10 ML: at 00:35

## 2023-04-09 RX ADMIN — HYDROCHLOROTHIAZIDE 25 MG: 25 TABLET ORAL at 08:48

## 2023-04-09 RX ADMIN — POTASSIUM CHLORIDE 40 MEQ: 1500 TABLET, EXTENDED RELEASE ORAL at 13:00

## 2023-04-09 RX ADMIN — HYDROXYZINE HYDROCHLORIDE 25 MG: 25 TABLET, FILM COATED ORAL at 08:49

## 2023-04-09 RX ADMIN — Medication 10 ML: at 12:38

## 2023-04-09 NOTE — CONSULTS
CARDIOLOGY CONSULT:    Manfred Perry  1950  male  [id number]      Referring Provider: Hospitalist  Reason for Consultation: Chest pain and shortness of breath and palpitations    Patient Care Team:  Piedad Maloney APRN as PCP - General (Family Medicine)    Chief complaint chest pain and shortness of breath and palpitations    Subjective .     History of present illness:  Manfred Perry is a 72 y.o. male with history of hypertension hyperlipidemia thyroid problems and gastroesophageal disease in the past presented to the hospital complains of chest pain shortness of breath and palpitations.  Patient started having bilateral arm pain which then radiated to the chest along with some shortness of breath.  He also started having palpitations.  No complains of any PND or orthopnea.  No dizziness syncope.  He has been having some swelling of the feet.  Patient has been taking his medicines regularly.  In the ER patient was noted to have very high blood pressure and heart rate was high.  He was given adenosine and placed on Lopressor and Nitropaste.  His potassium magnesium levels were slightly low and hence he was given treatment.  His initial EKG showed SVT but did not have any recurrences.    Review of Systems   Constitutional: Negative for fever and malaise/fatigue.   HENT: Negative for ear pain and nosebleeds.    Eyes: Negative for blurred vision and double vision.   Cardiovascular: Positive for chest pain and palpitations. Negative for dyspnea on exertion.   Respiratory: Positive for shortness of breath. Negative for cough.    Skin: Negative for rash.   Musculoskeletal: Negative for joint pain.   Gastrointestinal: Negative for abdominal pain, nausea and vomiting.   Neurological: Negative for focal weakness and headaches.   Psychiatric/Behavioral: Negative for depression. The patient is not nervous/anxious.    All other systems reviewed and are negative.      History  Past Medical History:   Diagnosis Date    • Disease of thyroid gland    • Dysphagia 01/2023   • GERD (gastroesophageal reflux disease)    • Hypertension    • Hypokalemia 4/9/2023   • Hypomagnesemia 4/9/2023       Past Surgical History:   Procedure Laterality Date   • APPENDECTOMY     • ENDOSCOPY N/A 8/10/2022    Procedure: EGD WITH DILATION with 42 bougie and 12-15mm balloon to 15mm;  Surgeon: RAJAN Moctezuma MD;  Location: [facility]COPY;  Service: Gastroenterology;  Laterality: N/A;  esophagitis and hiatal hernia   • ENDOSCOPY N/A 11/15/2022    Procedure: ESOPHAGOGASTRODUODENOSCOPY WITH DILATION ( BOUGIE 46, );  Surgeon: RAJAN Moctezuma MD;  Location: [facility] ENDOSCOPY;  Service: Gastroenterology;  Laterality: N/A;  HIATIAL HERNIA  GASTRITIS  ESOPHOGEAL STRICTURE   • ENDOSCOPY N/A 1/19/2023    Procedure: ESOPHAGOGASTRODUODENOSCOPY with esophageal dilation (bougie 50, 52) and biopsy x 3 areas;  Surgeon: RAJAN Moctezuma MD;  Location: [facility]COPY;  Service: Gastroenterology;  Laterality: N/A;  esophagitis, hiatal hernia, esophageal stricture         History reviewed. No pertinent family history.    Social History     Tobacco Use   • Smoking status: Never   • Smokeless tobacco: Never   Vaping Use   • Vaping Use: Never used   Substance Use Topics   • Alcohol use: Not Currently   • Drug use: Never     Comment: suboxone        Medications Prior to Admission   Medication Sig Dispense Refill Last Dose   • buprenorphine-naloxone (SUBOXONE) 8-2 MG per SL tablet Place 1 tablet under the tongue 2 (Two) Times a Day.   4/8/2023   • cloNIDine (CATAPRES) 0.1 MG tablet Take 1 tablet by mouth 2 (Two) Times a Day.   4/8/2023   • hydroCHLOROthiazide (HYDRODIURIL) 25 MG tablet Take 1 tablet by mouth Daily.   4/8/2023   • hydrOXYzine (ATARAX) 25 MG tablet Take 1 tablet by mouth Every 6 (Six) Hours As Needed for Anxiety. 16 tablet 0 4/8/2023   • losartan (COZAAR) 100 MG tablet Take 1 tablet by mouth Daily.   4/8/2023   • omeprazole (PrilOSEC) 40 MG capsule Take 1  "capsule by mouth Daily. 14 capsule 0 Past Week   • ondansetron ODT (ZOFRAN-ODT) 4 MG disintegrating tablet Place 1 tablet on the tongue Every 8 (Eight) Hours As Needed for Nausea or Vomiting. 20 tablet 0          Patient has no known allergies.    Scheduled Meds:buprenorphine-naloxone, 1 tablet, Sublingual, BID  cloNIDine, 0.1 mg, Oral, BID  hydroCHLOROthiazide, 25 mg, Oral, Daily  losartan, 100 mg, Oral, Daily  nitroglycerin, 1 inch, Topical, TID - Nitrates  pantoprazole, 40 mg, Oral, Q AM  sodium chloride, 10 mL, Intravenous, Q12H      Continuous Infusions:   PRN Meds:.•  acetaminophen **OR** acetaminophen **OR** acetaminophen  •  aluminum-magnesium hydroxide-simethicone  •  hydrOXYzine  •  ketorolac  •  magnesium sulfate **OR** magnesium sulfate **OR** magnesium sulfate  •  melatonin  •  nitroglycerin  •  ondansetron **OR** ondansetron  •  potassium chloride **OR** potassium chloride **OR** potassium chloride  •  sodium chloride  •  sodium chloride    Objective     VITAL SIGNS  Vitals:    04/09/23 0517 04/09/23 0647 04/09/23 1017 04/09/23 1407   BP: 138/77  135/74 133/65   BP Location: Right arm  Right arm Right arm   Patient Position: Lying  Lying Lying   Pulse: 63  63 74   Resp: 18  18 15   Temp: 98.2 °F (36.8 °C)  98.4 °F (36.9 °C) 98.2 °F (36.8 °C)   TempSrc: Oral  Oral Oral   SpO2: 95%  95% 96%   Weight: 118 kg (259 lb 7.7 oz) 118 kg (259 lb 7.7 oz)     Height:           Flowsheet Rows    Flowsheet Row First Filed Value   Admission Height 170.2 cm (67\") Documented at 04/08/2023 2121   Admission Weight 120 kg (265 lb) Documented at 04/08/2023 2121           TELEMETRY: Sinus rhythm with nonspecific ST segment abnormality    Physical Exam:  Constitutional:       Appearance: Well-developed.   Eyes:      General: No scleral icterus.     Conjunctiva/sclera: Conjunctivae normal.      Pupils: Pupils are equal, round, and reactive to light.   HENT:      Head: Normocephalic and atraumatic.   Neck:      Vascular: No " carotid bruit or JVD.   Pulmonary:      Effort: Pulmonary effort is normal.      Breath sounds: Normal breath sounds. No wheezing. No rales.   Cardiovascular:      Normal rate. Regular rhythm.   Pulses:     Intact distal pulses.   Abdominal:      General: Bowel sounds are normal.      Palpations: Abdomen is soft.   Musculoskeletal: Normal range of motion.      Cervical back: Normal range of motion and neck supple. Skin:     General: Skin is warm and dry.      Findings: No rash.   Neurological:      Mental Status: Alert.      Comments: No focal deficits          Results Review:   I reviewed the patient's new clinical results.  Lab Results (last 24 hours)     Procedure Component Value Units Date/Time    CBC Auto Differential [[id number]] Updated: 04/09/23 1326    Specimen: Blood from Arm, Right     Basic Metabolic Panel [[id number]]  (Abnormal) Collected: 04/09/23 1240    Specimen: Blood from Arm, Right Updated: 04/09/23 1323     Glucose 181 mg/dL      BUN 14 mg/dL      Creatinine 0.85 mg/dL      Sodium 136 mmol/L      Potassium 4.2 mmol/L      Comment: Slight hemolysis detected by analyzer. Results may be affected.  Result checked          Chloride 98 mmol/L      CO2 28.0 mmol/L      Calcium 8.9 mg/dL      BUN/Creatinine Ratio 16.5     Anion Gap 10.0 mmol/L      eGFR 91.8 mL/min/1.73     Narrative:      GFR Normal >60  Chronic Kidney Disease <60  Kidney Failure <15    The GFR formula is only valid for adults with stable renal function between ages 18 and 70.    TSH [[id number]]  (Abnormal) Collected: 04/09/23 1240    Specimen: Blood from Arm, Right Updated: 04/09/23 1319     TSH 12.100 uIU/mL     Lipid Panel [[id number]]  (Abnormal) Collected: 04/09/23 1240    Specimen: Blood from Arm, Right Updated: 04/09/23 1316     Total Cholesterol 198 mg/dL      Triglycerides 128 mg/dL      HDL Cholesterol 39 mg/dL      LDL Cholesterol  136 mg/dL      VLDL Cholesterol 23 mg/dL      LDL/HDL Ratio 3.42    Narrative:      Cholesterol  Reference Ranges  (U.S. Department of Health and Human Services ATP III Classifications)    Desirable          <200 mg/dL  Borderline High    200-239 mg/dL  High Risk          >240 mg/dL      Triglyceride Reference Ranges  (U.S. Department of Health and Human Services ATP III Classifications)    Normal           <150 mg/dL  Borderline High  150-199 mg/dL  High             200-499 mg/dL  Very High        >500 mg/dL    HDL Reference Ranges  (U.S. Department of Health and Human Services ATP III Classifications)    Low     <40 mg/dl (major risk factor for CHD)  High    >60 mg/dl ('negative' risk factor for CHD)        LDL Reference Ranges  (U.S. Department of Health and Human Services ATP III Classifications)    Optimal          <100 mg/dL  Near Optimal     100-129 mg/dL  Borderline High  130-159 mg/dL  High             160-189 mg/dL  Very High        >189 mg/dL    Hemoglobin A1c [[id number]]  (Abnormal) Collected: 04/09/23 1240    Specimen: Blood from Arm, Right Updated: 04/09/23 1310     Hemoglobin A1C 7.40 %     POC Glucose Once [[id number]]  (Abnormal) Collected: 04/09/23 1148    Specimen: Blood Updated: 04/09/23 1149     Glucose 164 mg/dL      Comment: Serial Number: [id number]:  [id number]       POC Glucose Once [[id number]]  (Abnormal) Collected: 04/09/23 0715    Specimen: Blood Updated: 04/09/23 0716     Glucose 136 mg/dL      Comment: Serial Number: [id number]:  [id number]       High Sensitivity Troponin T 2Hr [[id number]]  (Abnormal) Collected: 04/08/23 2318    Specimen: Blood Updated: 04/09/23 0000     HS Troponin T 115 ng/L      Troponin T Delta 93 ng/L     Narrative:      High Sensitive Troponin T Reference Range:  <10.0 ng/L- Negative Female for AMI  <15.0 ng/L- Negative Male for AMI  >=10 - Abnormal Female indicating possible myocardial injury.  >=15 - Abnormal Male indicating possible myocardial injury.   Clinicians would have to utilize clinical acumen, EKG, Troponin, and serial changes to  determine if it is an Acute Myocardial Infarction or myocardial injury due to an underlying chronic condition.         Magnesium [[id number]]  (Abnormal) Collected: 04/08/23 2133    Specimen: Blood Updated: 04/08/23 2309     Magnesium 1.3 mg/dL     [place] Draw [[id number]] Collected: 04/08/23 2133    Specimen: Blood Updated: 04/08/23 2246    Narrative:      The following orders were created for panel order [place] Draw.  Procedure                               Abnormality         Status                     ---------                               -----------         ------                     Green Top (Gel)[[id number]]                                  Final result               Lavender Top[[id number]]                                     Final result               Gold Top - SST[[id number]]                                   Final result               Light Blue Top[[id number]]                                   Final result                 Please view results for these tests on the individual orders.    Lavender Top [[id number]] Collected: 04/08/23 2133    Specimen: Blood Updated: 04/08/23 2246     Extra Tube hold for add-on     Comment: Auto resulted       Light Blue Top [[id number]] Collected: 04/08/23 2133    Specimen: Blood Updated: 04/08/23 2246     Extra Tube Hold for add-ons.     Comment: Auto resulted       BNP [[id number]]  (Normal) Collected: 04/08/23 2133    Specimen: Blood Updated: 04/08/23 2239     proBNP 143.0 pg/mL     Narrative:      Among patients with dyspnea, NT-proBNP is highly sensitive for the detection of acute congestive heart failure. In addition NT-proBNP of <300 pg/ml effectively rules out acute congestive heart failure with 99% negative predictive value.    Results may be falsely decreased if patient taking Biotin.      Comprehensive Metabolic Panel [[id number]]  (Abnormal) Collected: 04/08/23 2133    Specimen: Blood Updated: 04/08/23 2230     Glucose 220 mg/dL      BUN 17 mg/dL      Creatinine  0.99 mg/dL      Sodium 138 mmol/L      Potassium 3.0 mmol/L      Chloride 97 mmol/L      CO2 23.0 mmol/L      Calcium 9.1 mg/dL      Total Protein 8.3 g/dL      Albumin 4.2 g/dL      ALT (SGPT) 15 U/L      AST (SGOT) 31 U/L      Alkaline Phosphatase 101 U/L      Total Bilirubin 0.6 mg/dL      Globulin 4.1 gm/dL      A/G Ratio 1.0 g/dL      BUN/Creatinine Ratio 17.2     Anion Gap 18.0 mmol/L      eGFR 80.4 mL/min/1.73     Narrative:      GFR Normal >60  Chronic Kidney Disease <60  Kidney Failure <15    The GFR formula is only valid for adults with stable renal function between ages 18 and 70.    High Sensitivity Troponin T [[id number]]  (Abnormal) Collected: 04/08/23 2133    Specimen: Blood Updated: 04/08/23 2230     HS Troponin T 22 ng/L     Narrative:      High Sensitive Troponin T Reference Range:  <10.0 ng/L- Negative Female for AMI  <15.0 ng/L- Negative Male for AMI  >=10 - Abnormal Female indicating possible myocardial injury.  >=15 - Abnormal Male indicating possible myocardial injury.   Clinicians would have to utilize clinical acumen, EKG, Troponin, and serial changes to determine if it is an Acute Myocardial Infarction or myocardial injury due to an underlying chronic condition.         Protime-INR [[id number]]  (Normal) Collected: 04/08/23 2133    Specimen: Blood Updated: 04/08/23 2227     Protime 10.3 Seconds      INR 1.00    aPTT [[id number]]  (Abnormal) Collected: 04/08/23 2133    Specimen: Blood Updated: 04/08/23 2227     PTT 32.2 seconds     CBC & Differential [[id number]]  (Abnormal) Collected: 04/08/23 2133    Specimen: Blood Updated: 04/08/23 2214    Narrative:      The following orders were created for panel order CBC & Differential.  Procedure                               Abnormality         Status                     ---------                               -----------         ------                     CBC Auto Differential[[id number]]        Abnormal            Final result                 Please  view results for these tests on the individual orders.    CBC Auto Differential [[id number]]  (Abnormal) Collected: 04/08/23 2133    Specimen: Blood Updated: 04/08/23 2214     WBC 11.80 10*3/mm3      RBC 4.30 10*6/mm3      Hemoglobin 13.0 g/dL      Hematocrit 38.2 %      MCV 88.8 fL      MCH 30.3 pg      MCHC 34.2 g/dL      RDW 13.1 %      RDW-SD 42.9 fl      MPV 9.1 fL      Platelets 256 10*3/mm3      Neutrophil % 74.6 %      Lymphocyte % 13.9 %      Monocyte % 9.2 %      Eosinophil % 1.3 %      Basophil % 1.0 %      Neutrophils, Absolute 8.80 10*3/mm3      Lymphocytes, Absolute 1.60 10*3/mm3      Monocytes, Absolute 1.10 10*3/mm3      Eosinophils, Absolute 0.10 10*3/mm3      Basophils, Absolute 0.10 10*3/mm3      nRBC 0.1 /100 WBC     Gold Top - SST [[id number]] Collected: 04/08/23 2133    Specimen: Blood Updated: 04/08/23 2153    Green Top (Gel) [[id number]] Collected: 04/08/23 2133    Specimen: Blood Updated: 04/08/23 2152          Imaging Results (Last 24 Hours)     Procedure Component Value Units Date/Time    XR Chest 1 View [[id number]] Collected: 04/09/23 0725     Updated: 04/09/23 0728    Narrative:      XR CHEST 1 VW    Date of Exam: 4/8/2023 9:59 PM EDT    Indication: chest pain.    Comparison: None available.    Findings:  The heart size is borderline enlarged. Pulmonary vascular pattern the chest is normal and the lungs are clear.      Impression:      Impression:    1. Borderline cardiac enlargement.  2. No active disease.    Electronically Signed: Vj Hurley    4/9/2023 7:26 AM EDT    Workstation ID: [id number]          EKG      I personally viewed and interpreted the patient's EKG/Telemetry data:    ECHOCARDIOGRAM:         STRESS MYOVIEW:  .    CARDIAC CATHETERIZATION:    OTHER:         Assessment & Plan     Principal Problem:    Chest pain, unspecified type  Active Problems:    Hypertension    Anxiety    Hypokalemia    Hypomagnesemia  Elevated troponin  SVT  Hyperlipidemia    Patient presented with  chest pain shortness of breath and palpitations and had SVT which was converted to sinus rhythm with adenosine bolus  Patient is being ruled out for MI although his troponin slightly elevated and will watch his serial troponins  Patient's blood pressure and heart rate are stable now but he is on clonidine and losartan and I will change to beta-blockers because of his SVT and non-STEMI  Patient's lipid levels will be checked and replaced on statins if needed  Patient is having an echocardiogram for LV function and valvular abnormalities  Patient will also have a Lexiscan Myoview study to rule out ischemia and then most likely might need a cardiac catheterization.  Patient's thyroid function tests are also abnormal and will be followed by the primary care doctor    I discussed the patients findings and my recommendations with patient and nurse    Matias Loyola MD  04/09/23  14:20 EDT

## 2023-04-09 NOTE — PLAN OF CARE
Goal Outcome Evaluation:  Plan of Care Reviewed With: patient           Outcome Evaluation: Patient denies chest pain. Patient seen by cardiology today. Stress test and echo ordered for 4/10.

## 2023-04-09 NOTE — ED PROVIDER NOTES
Subjective   History of Present Illness  72-year-old male has had intermittent episodes of chest pain rapid heart rate for the last 2 weeks he states it was worse than ever tonight.  He states he is more short of breath and had more chest tightness he reports he has had no episodes of diaphoresis but has had a lot of palpitations.  He reports no recent fever chills or cough.  He reports no recent swelling in his lower extremities over about a 2-week.  He reports that he has had no recent changes in medication or activity but states that he has had some exertional dyspnea in the last couple of days        Review of Systems   Constitutional: Negative for chills and fever.   HENT: Negative for trouble swallowing.    Respiratory: Positive for chest tightness and shortness of breath. Negative for cough.    Cardiovascular: Positive for chest pain, palpitations and leg swelling.   Gastrointestinal: Negative for vomiting.   Hematological: Does not bruise/bleed easily.       Past Medical History:   Diagnosis Date   • Disease of thyroid gland    • Dysphagia 01/2023   • GERD (gastroesophageal reflux disease)    • Hypertension      States that he has been told in the past he has fibromyalgia.  He reports no history of recent cardiac stress testing or catheterization.  No Known Allergies    Past Surgical History:   Procedure Laterality Date   • APPENDECTOMY     • ENDOSCOPY N/A 8/10/2022    Procedure: EGD WITH DILATION with 42 bougie and 12-15mm balloon to 15mm;  Surgeon: RAJAN Moctezuma MD;  Location: [facility] ENDOSCOPY;  Service: Gastroenterology;  Laterality: N/A;  esophagitis and hiatal hernia   • ENDOSCOPY N/A 11/15/2022    Procedure: ESOPHAGOGASTRODUODENOSCOPY WITH DILATION ( BOUGIE 46, );  Surgeon: RAJAN Moctezuma MD;  Location: [facility] ENDOSCOPY;  Service: Gastroenterology;  Laterality: N/A;  HIATIAL HERNIA  GASTRITIS  ESOPHOGEAL STRICTURE   • ENDOSCOPY N/A 1/19/2023    Procedure: ESOPHAGOGASTRODUODENOSCOPY with  "esophageal dilation (bougie 50, 52) and biopsy x 3 areas;  Surgeon: RAJAN Moctezuma MD;  Location: [facility] ENDOSCOPY;  Service: Gastroenterology;  Laterality: N/A;  esophagitis, hiatal hernia, esophageal stricture         No family history on file.  Positive for hypertension and heart disease  Social History     Socioeconomic History   • Marital status:    Tobacco Use   • Smoking status: Never   • Smokeless tobacco: Never   Vaping Use   • Vaping Use: Never used   Substance and Sexual Activity   • Alcohol use: Not Currently   • Drug use: Never     Comment: suboxone   • Sexual activity: Defer           Objective   Physical Exam  Alert [place] Coma Scale 15   HEENT: Pupils equal and reactive to light. Conjunctivae are not injected. Normal tympanic membranes. Oropharynx and nares are normal.   Neck: Supple. Midline trachea. No JVD. No goiter.   Chest: Clear and equal breath sounds bilaterally, regular rate and rhythm without murmur or rub.  SVT initially   Abdomen: Positive bowel sounds, nontender, nondistended. No rebound or peritoneal signs. No CVA tenderness.   Extremities no clubbing. cyanosis or edema. Motor sensory exam is normal. The full range of motion is intact   Skin: Warm and dry, no rashes or petechia.   Lymphatic: No regional lymphadenopathy. No calf pain, swelling or Homans sign    Procedures  Injected with adenosine, conversion to sinus tachycardia         ED Course  ED Course as of 04/08/23 2320   Sat Apr 08, 2023   2319 Discussed with TED Soriano.  [LB]      ED Course User Index  [LB] ChristianViralTED Xiao      /67   Pulse 79   Temp 98 °F (36.7 °C) (Oral)   Resp 18   Ht 170.2 cm (67\")   Wt 120 kg (265 lb)   SpO2 94%   BMI 41.50 kg/m²   Labs Reviewed   COMPREHENSIVE METABOLIC PANEL - Abnormal; Notable for the following components:       Result Value    Glucose 220 (*)     Potassium 3.0 (*)     Chloride 97 (*)     Anion Gap 18.0 (*)     All other components within normal " limits    Narrative:     GFR Normal >60  Chronic Kidney Disease <60  Kidney Failure <15    The GFR formula is only valid for adults with stable renal function between ages 18 and 70.   APTT - Abnormal; Notable for the following components:    PTT 32.2 (*)     All other components within normal limits   TROPONIN - Abnormal; Notable for the following components:    HS Troponin T 22 (*)     All other components within normal limits    Narrative:     High Sensitive Troponin T Reference Range:  <10.0 ng/L- Negative Female for AMI  <15.0 ng/L- Negative Male for AMI  >=10 - Abnormal Female indicating possible myocardial injury.  >=15 - Abnormal Male indicating possible myocardial injury.   Clinicians would have to utilize clinical acumen, EKG, Troponin, and serial changes to determine if it is an Acute Myocardial Infarction or myocardial injury due to an underlying chronic condition.        CBC WITH AUTO DIFFERENTIAL - Abnormal; Notable for the following components:    WBC 11.80 (*)     Lymphocyte % 13.9 (*)     Neutrophils, Absolute 8.80 (*)     Monocytes, Absolute 1.10 (*)     All other components within normal limits   MAGNESIUM - Abnormal; Notable for the following components:    Magnesium 1.3 (*)     All other components within normal limits   PROTIME-INR - Normal   BNP (IN-HOUSE) - Normal    Narrative:     Among patients with dyspnea, NT-proBNP is highly sensitive for the detection of acute congestive heart failure. In addition NT-proBNP of <300 pg/ml effectively rules out acute congestive heart failure with 99% negative predictive value.    Results may be falsely decreased if patient taking Biotin.     RAINBOW DRAW    Narrative:     The following orders were created for panel order [place] Draw.  Procedure                               Abnormality         Status                     ---------                               -----------         ------                     Green Top (Gel)[[id number]]                                   Final result               Lavender Top[[id number]]                                     Final result               Gold Top - SST[[id number]]                                   Final result               Light Blue Top[[id number]]                                   Final result                 Please view results for these tests on the individual orders.   HIGH SENSITIVITIY TROPONIN T 2HR   GREEN TOP   LAVENDER TOP   GOLD TOP - SST   LIGHT BLUE TOP   CBC AND DIFFERENTIAL    Narrative:     The following orders were created for panel order CBC & Differential.  Procedure                               Abnormality         Status                     ---------                               -----------         ------                     CBC Auto Differential[[id number]]        Abnormal            Final result                 Please view results for these tests on the individual orders.     Medications   magnesium sulfate 2g/50 mL (PREMIX) infusion (has no administration in time range)   adenosine (ADENOCARD) injection 12 mg (12 mg Intravenous Given 4/8/23 2140)   metoprolol tartrate (LOPRESSOR) injection 5 mg (5 mg Intravenous Given 4/8/23 2141)   aspirin tablet 325 mg (325 mg Oral Given 4/8/23 2144)   nitroglycerin (NITROSTAT) ointment 1 inch (1 inch Topical Given 4/8/23 2144)   potassium chloride (K-DUR,KLOR-CON) CR tablet 40 mEq (40 mEq Oral Given 4/8/23 2249)     No radiology results for the last day                                Medical Decision Making  Differential diagnosis considered for patient presentation includes, but not limited to electrolyte balance, arrhythmia, STEMI, NSTEMI.    Patient workup initiated, he was seen by previous attending provider, given adenosine, had conversion to sinus tachycardia.  Patient was given labetalol, nitro, aspirin per previous provider.  I replace his potassium here in the ED.  Magnesium pending.  2-hour troponin taken at time of admission.  Patient's resting comfortably,  no acute distress.  Consultation to hospitalist: Spoke with TED Pa.  Grades the current admission.  Disposition : Patient be admitted to hospitalist service for chest pain, SVT.  Note Disclaimer: At [facility], we believe that sharing information builds trust and better relationships. You are receiving this note because you recently visited [facility]. It is possible you will see health information before a provider has talked with you about it. This kind of information can be easy to misunderstand. To help you fully understand what it means for your health, we urge you to discuss this note with your provider.Note dictated utilizing Dragon Dictation. Appropriate PPE worn during patient interactions.      Amount and/or Complexity of Data Reviewed  Labs: ordered.  Radiology: ordered and independent interpretation performed.  ECG/medicine tests: ordered.      Risk  OTC drugs.  Prescription drug management.          Final diagnoses:   Chest pain, unspecified type   SVT (supraventricular tachycardia)       ED Disposition  ED Disposition     ED Disposition   Decision to Admit    Condition   --    Comment   Level of Care: Telemetry [5]   Admitting Physician: SILVIA LUDWIG [[id number]]   Attending Physician: SILVIA LUDWIG [[id number]]               No follow-up provider specified.       Medication List      No changes were made to your prescriptions during this visit.          Teagan Gonzales, TED  04/08/23 3891

## 2023-04-09 NOTE — PROGRESS NOTES
[facility] Daily Progress Note    Patient Name: Manfred Perry  : 1950  MRN: [id number]  Primary Care Physician:  Piedad Maloney APRN  Date of admission: 2023      Subjective      Chief Complaint:   Chest Pain      History of Present Illness   Mr. Perry is a 72 y.o.male with a history of hypertension, anxiety,and chronic pain that presents to [facility]  complaining of several weeks of bilateral arm pain, and a rapid heart that he states was worse this evening, he states that he has been having these episodes over the last several days to weeks, that he has been able to get them to subside with rest, he states that this evening he could not get the pain to stop and he feels like he also overly excited, prompting him to report to the emergency department for further evaluation.  He denies any exacerbating factors, or associated palpitations, shortness of breath, diaphoresis, nausea, vomiting, lightheadedness, syncope, fever, chills, cough, abdominal pain, diarrhea, constipation, or  symptoms.  He denies any previous similar symptoms, he does report high blood pressure, with recent addition of clonidine by his primary care provider. He also reports bilateral lower extremity edema that he feels has become worse over the last several days.     At time of exam patient is resting in bed comfortably, he does appear very anxious, and states that this is his normal level of anxiety.      Initial evaluation in the emergency department reveals  Vital signs /101, heart rate 165, respiratory rate 18, oxygen saturation 94% on room air, he was afebrile with a Tmax of 98.0  His initial  high-sensitivity troponin is 22, with recheck his 115, with elevated troponin T and 93 he was hyperglycemic, hypokalemic, hypomagnesia, slight elevation his white blood cells 11.80 his initial EKG upon arrival he was noted to be in SVT heart rate of 163, he was administered  12mg of adenosine that successfully brought his rate down, his repeat  EKG showed sinus rhythm with slight ST depression, heart rate 77.  He was administered 12 mg adenosine, 3 and 25 mg ASA, 5 mg Lopressor, 1 inch Nitropaste, 40 mEq of potassium, and 2 g of magnesium while in the emergency room.      Patient will be admitted to the [facility] group for evaluation and treatment of chest pain and other acute and or chronic conditions.       4/9/2023 patient seen and examined in bed no acute distress, vital signs stable, discussed with RN, awaiting further work-up for his chest pain.     ROS Review of Systems   Constitutional: Negative.    HENT: Negative.    Eyes: Negative.    Respiratory: Positive for chest tightness.    Cardiovascular: Positive for chest pain and leg swelling.   Gastrointestinal: Negative.    Endocrine: Negative.    Genitourinary: Negative.    Musculoskeletal: Negative.    Skin: Negative.    Allergic/Immunologic: Negative.    Neurological: Negative.    Hematological: Negative.    Psychiatric/Behavioral: The patient is nervous/anxious.    All other systems reviewed and are negative.     Objective      Vitals:   Temp:  [97.7 °F (36.5 °C)-98.2 °F (36.8 °C)] 98.2 °F (36.8 °C)  Heart Rate:  [] 63  Resp:  [12-18] 18  BP: (124-163)/() 138/77    Physical Exam *Constitutional:       Appearance: He is obese.   HENT:      Mouth/Throat:      Mouth: Mucous membranes are dry.   Eyes:      Conjunctiva/sclera: Conjunctivae normal.   Cardiovascular:      Rate and Rhythm: Normal rate and regular rhythm.      Pulses:           Radial pulses are 2+ on the right side and 2+ on the left side.        Dorsalis pedis pulses are 1+ on the right side and 1+ on the left side.        Posterior tibial pulses are 1+ on the right side and 1+ on the left side.      Heart sounds: Normal heart sounds.   Pulmonary:      Effort: Pulmonary effort is normal.      Breath sounds: Examination of the right-lower field reveals  decreased breath sounds. Examination of the left-lower field reveals decreased breath sounds. Decreased breath sounds present.   Abdominal:      General: Bowel sounds are normal.      Palpations: Abdomen is soft.   Musculoskeletal:      Right lower le+ Pitting Edema present.      Left lower le+ Pitting Edema present.   Skin:     General: Skin is warm and dry.      Capillary Refill: Capillary refill takes less than 2 seconds.   Neurological:      General: No focal deficit present.      Mental Status: He is alert and oriented to person, place, and time.   Psychiatric:         Mood and Affect: Mood is anxious.         Speech: Speech is rapid and pressured.         Behavior: Behavior is hyperactive.            Result Review    Result Review:  I have personally reviewed the results from the time of this admission to 2023 10:12 EDT and agree with these findings:  []  Laboratory  []  Microbiology  []  Radiology  []  EKG/Telemetry   []  Cardiology/Vascular   []  Pathology  []  Old records  []  Other:  Most notable findings include:         CBC:      Lab 23  2133   WBC 11.80*   HEMOGLOBIN 13.0   HEMATOCRIT 38.2   PLATELETS 256   NEUTROS ABS 8.80*   LYMPHS ABS 1.60   MONOS ABS 1.10*   EOS ABS 0.10   MCV 88.8     CMP:      Lab 23  2133   SODIUM 138   POTASSIUM 3.0*   CHLORIDE 97*   CO2 23.0   ANION GAP 18.0*   BUN 17   CREATININE 0.99   EGFR 80.4   GLUCOSE 220*   CALCIUM 9.1   MAGNESIUM 1.3*   TOTAL PROTEIN 8.3   ALBUMIN 4.2   GLOBULIN 4.1   ALT (SGPT) 15   AST (SGOT) 31   BILIRUBIN 0.6   ALK PHOS 101     No results found for: ACANTHNAEG, AFBCX, BPERTUSSISCX, BLOODCX  No results found for: BCIDPCR, CXREFLEX, CSFCX, CULTURETIS  No results found for: CULTURES, HSVCX, URCX  No results found for: EYECULTURE, GCCX, HSVCULTURE, LABHSV  No results found for: LEGIONELLA, MRSACX, MUMPSCX, MYCOPLASCX  No results found for: NOCARDIACX, STOOLCX  No results found for: THROATCX, UNSTIMCULT, URINECX, CULTURE,  VZVCULTUR  No results found for: VIRALCULTU, WOUNDCX      Assessment & Plan      Brief Patient Summary:  Manfred Perry is a 72 y.o. male who       cloNIDine, 0.1 mg, Oral, BID  hydroCHLOROthiazide, 25 mg, Oral, Daily  losartan, 100 mg, Oral, Daily  nitroglycerin, 1 inch, Topical, TID - Nitrates  pantoprazole, 40 mg, Oral, Q AM  sodium chloride, 10 mL, Intravenous, Q12H             Active Hospital Problems:  Active Hospital Problems    Diagnosis    • **Chest pain, unspecified type    • Hypokalemia    • Hypomagnesemia    • Hypertension    • Anxiety      Chest pain, unspecified type   Patient in SVT at time of admission was probed by adenosine  Continue Nitropaste  Continuous cardiac monitoring pulse oximetry  Mental oxygen as needed for hypoxia/respiratory distress to maintain oxygen saturation greater than 90%.  ASA administered in ED   Elevated high-sensitivity troponin initially 22, elevated 115, troponin T delta 93 could be due to SVT at time of admission  No acute ischemia on EKG  Cardiology consulted to evaluate and treat  Check TSH, hemoglobin A1c, and lipid panel in a.m..     Hypokalemia  Hypomagnesia  Acute  Electrolyte replacement initiated     Essential  Hypertension Chronic   Poorly controled   Vital signs per policy   Continue home hydrochlorothiazide, clonidine, and losartan     Anxiety Chronic   Continue home hydroxyzine      • I discussed the patient's findings and my recommendations with patient.     VTE Prophylaxis - SCDs.       DVT prophylaxis:  Mechanical DVT prophylaxis orders are present.    CODE STATUS:    Code Status (Patient has no pulse and is not breathing): CPR (Attempt to Resuscitate)  Medical Interventions (Patient has pulse or is breathing): Full Support      Disposition:  I expect patient to be discharged home.    I have utilized all available, immediate resources to obtain, update, or review the patient's current medications including all prescriptions, over-the-counter products,  herbals, cannabis/cannabidiol products, and vitamin.mineral/dietary (nutritional) supplements.      Code Status (Patient has no pulse and is not breathing): CPR (Attempt to Resuscitate)  Medical Interventions (Patient has pulse or is breathing): Full Support    Next of kin of Power of :       Admission Status:  I believe this patient meets obs status.    Hospital Medicine Quality Measures for Heart Failure:            This patient has been examined wearing appropriate Personal Protective Equipment and discussed with rn. 04/09/23      Electronically signed by Omi Lane MD, 04/09/23, 10:12 EDT.  [facility] Hospitalist Team

## 2023-04-09 NOTE — H&P
[facility]   [facility]        Patient Name:  Manfred Perry  YOB: 1950  MRN:  [id number]  Admit Date:  4/8/2023  Patient Care Team:  Piedad Maloney APRN as PCP - General (Family Medicine)      Subjective   History Present Illness     Chief Complaint   Patient presents with   • Chest Pain     History of Present Illness   Mr. Perry is a 72 y.o.male with a history of hypertension, anxiety,and chronic pain that presents to [facility]  complaining of several weeks of bilateral arm pain, and a rapid heart that he states was worse this evening, he states that he has been having these episodes over the last several days to weeks, that he has been able to get them to subside with rest, he states that this evening he could not get the pain to stop and he feels like he also overly excited, prompting him to report to the emergency department for further evaluation.  He denies any exacerbating factors, or associated palpitations, shortness of breath, diaphoresis, nausea, vomiting, lightheadedness, syncope, fever, chills, cough, abdominal pain, diarrhea, constipation, or  symptoms.  He denies any previous similar symptoms, he does report high blood pressure, with recent addition of clonidine by his primary care provider. He also reports bilateral lower extremity edema that he feels has become worse over the last several days.    At time of exam patient is resting in bed comfortably, he does appear very anxious, and states that this is his normal level of anxiety.     Initial evaluation in the emergency department reveals  Vital signs /101, heart rate 165, respiratory rate 18, oxygen saturation 94% on room air, he was afebrile with a Tmax of 98.0  His initial  high-sensitivity troponin is 22, with recheck his 115, with elevated troponin T and 93 he was hyperglycemic, hypokalemic, hypomagnesia, slight elevation his white blood cells 11.80 his initial EKG upon arrival he was noted  to be in SVT heart rate of 163, he was administered 12mg of adenosine that successfully brought his rate down, his repeat  EKG showed sinus rhythm with slight ST depression, heart rate 77.  He was administered 12 mg adenosine, 3 and 25 mg ASA, 5 mg Lopressor, 1 inch Nitropaste, 40 mEq of potassium, and 2 g of magnesium while in the emergency room.     Patient will be admitted to the \[facility]\"" group for evaluation and treatment of chest pain and other acute and or chronic conditions.       Review of Systems   Constitutional: Negative.    HENT: Negative.    Eyes: Negative.    Respiratory: Positive for chest tightness.    Cardiovascular: Positive for chest pain and leg swelling.   Gastrointestinal: Negative.    Endocrine: Negative.    Genitourinary: Negative.    Musculoskeletal: Negative.    Skin: Negative.    Allergic/Immunologic: Negative.    Neurological: Negative.    Hematological: Negative.    Psychiatric/Behavioral: The patient is nervous/anxious.    All other systems reviewed and are negative.       Personal History     Past Medical History:   Diagnosis Date   • Disease of thyroid gland    • Dysphagia 01/2023   • GERD (gastroesophageal reflux disease)    • Hypertension      Past Surgical History:   Procedure Laterality Date   • APPENDECTOMY     • ENDOSCOPY N/A 8/10/2022    Procedure: EGD WITH DILATION with 42 bougie and 12-15mm balloon to 15mm;  Surgeon: RAJAN Moctezuma MD;  Location: [facility] ENDOSCOPY;  Service: Gastroenterology;  Laterality: N/A;  esophagitis and hiatal hernia   • ENDOSCOPY N/A 11/15/2022    Procedure: ESOPHAGOGASTRODUODENOSCOPY WITH DILATION ( BOUGIE 46, );  Surgeon: RAJAN Moctezuma MD;  Location: [facility] ENDOSCOPY;  Service: Gastroenterology;  Laterality: N/A;  HIATIAL HERNIA  GASTRITIS  ESOPHOGEAL STRICTURE   • ENDOSCOPY N/A 1/19/2023    Procedure: ESOPHAGOGASTRODUODENOSCOPY with esophageal dilation (bougie 50, 52) and biopsy x 3 areas;  Surgeon: RAJAN Moctezuma MD;  Location: [facility]  ENDOSCOPY;  Service: Gastroenterology;  Laterality: N/A;  esophagitis, hiatal hernia, esophageal stricture       No family history on file.  Social History     Tobacco Use   • Smoking status: Never   • Smokeless tobacco: Never   Vaping Use   • Vaping Use: Never used   Substance Use Topics   • Alcohol use: Not Currently   • Drug use: Never     Comment: suboxone     No current facility-administered medications on file prior to encounter.     Current Outpatient Medications on File Prior to Encounter   Medication Sig Dispense Refill   • buprenorphine-naloxone (SUBOXONE) 8-2 MG per SL tablet Place 1 tablet under the tongue 2 (Two) Times a Day.     • hydroCHLOROthiazide (HYDRODIURIL) 25 MG tablet Take 25 mg by mouth Daily.     • hydrOXYzine (ATARAX) 25 MG tablet Take 1 tablet by mouth Every 6 (Six) Hours As Needed for Anxiety. 16 tablet 0   • losartan (COZAAR) 100 MG tablet Take 100 mg by mouth Daily.     • omeprazole (PrilOSEC) 40 MG capsule Take 1 capsule by mouth Daily. 14 capsule 0   • ondansetron ODT (ZOFRAN-ODT) 4 MG disintegrating tablet Place 1 tablet on the tongue Every 8 (Eight) Hours As Needed for Nausea or Vomiting. 20 tablet 0     No Known Allergies    Objective    Objective     Vital Signs  Temp:  [98 °F (36.7 °C)] 98 °F (36.7 °C)  Heart Rate:  [] 79  Resp:  [12-18] 18  BP: (126-163)/() 126/67  SpO2:  [93 %-99 %] 94 %  on   ;      Body mass index is 41.5 kg/m².    Physical Exam  Vitals and nursing note reviewed.   Constitutional:       Appearance: He is obese.   HENT:      Mouth/Throat:      Mouth: Mucous membranes are dry.   Eyes:      Conjunctiva/sclera: Conjunctivae normal.   Cardiovascular:      Rate and Rhythm: Normal rate and regular rhythm.      Pulses:           Radial pulses are 2+ on the right side and 2+ on the left side.        Dorsalis pedis pulses are 1+ on the right side and 1+ on the left side.        Posterior tibial pulses are 1+ on the right side and 1+ on the left side.       Heart sounds: Normal heart sounds.   Pulmonary:      Effort: Pulmonary effort is normal.      Breath sounds: Examination of the right-lower field reveals decreased breath sounds. Examination of the left-lower field reveals decreased breath sounds. Decreased breath sounds present.   Abdominal:      General: Bowel sounds are normal.      Palpations: Abdomen is soft.   Musculoskeletal:      Right lower le+ Pitting Edema present.      Left lower le+ Pitting Edema present.   Skin:     General: Skin is warm and dry.      Capillary Refill: Capillary refill takes less than 2 seconds.   Neurological:      General: No focal deficit present.      Mental Status: He is alert and oriented to person, place, and time.   Psychiatric:         Mood and Affect: Mood is anxious.         Speech: Speech is rapid and pressured.         Behavior: Behavior is hyperactive.         Results Review:  I reviewed the patient's new clinical results.  I reviewed the patient's new imaging results and agree with the interpretation.  I reviewed the patient's other test results and agree with the interpretation  I personally viewed and interpreted the patient's EKG/Telemetry data  Discussed with ED provider.    Lab Results (last 24 hours)     Procedure Component Value Units Date/Time    CBC & Differential [[id number]]  (Abnormal) Collected: 23    Specimen: Blood Updated: 23    Narrative:      The following orders were created for panel order CBC & Differential.  Procedure                               Abnormality         Status                     ---------                               -----------         ------                     CBC Auto Differential[[id number]]        Abnormal            Final result                 Please view results for these tests on the individual orders.    Comprehensive Metabolic Panel [[id number]]  (Abnormal) Collected: 23    Specimen: Blood Updated: 23     Glucose 220 mg/dL       BUN 17 mg/dL      Creatinine 0.99 mg/dL      Sodium 138 mmol/L      Potassium 3.0 mmol/L      Chloride 97 mmol/L      CO2 23.0 mmol/L      Calcium 9.1 mg/dL      Total Protein 8.3 g/dL      Albumin 4.2 g/dL      ALT (SGPT) 15 U/L      AST (SGOT) 31 U/L      Alkaline Phosphatase 101 U/L      Total Bilirubin 0.6 mg/dL      Globulin 4.1 gm/dL      A/G Ratio 1.0 g/dL      BUN/Creatinine Ratio 17.2     Anion Gap 18.0 mmol/L      eGFR 80.4 mL/min/1.73     Narrative:      GFR Normal >60  Chronic Kidney Disease <60  Kidney Failure <15    The GFR formula is only valid for adults with stable renal function between ages 18 and 70.    Protime-INR [[id number]]  (Normal) Collected: 04/08/23 2133    Specimen: Blood Updated: 04/08/23 2227     Protime 10.3 Seconds      INR 1.00    aPTT [[id number]]  (Abnormal) Collected: 04/08/23 2133    Specimen: Blood Updated: 04/08/23 2227     PTT 32.2 seconds     BNP [[id number]]  (Normal) Collected: 04/08/23 2133    Specimen: Blood Updated: 04/08/23 2239     proBNP 143.0 pg/mL     Narrative:      Among patients with dyspnea, NT-proBNP is highly sensitive for the detection of acute congestive heart failure. In addition NT-proBNP of <300 pg/ml effectively rules out acute congestive heart failure with 99% negative predictive value.    Results may be falsely decreased if patient taking Biotin.      High Sensitivity Troponin T [[id number]]  (Abnormal) Collected: 04/08/23 2133    Specimen: Blood Updated: 04/08/23 2230     HS Troponin T 22 ng/L     Narrative:      High Sensitive Troponin T Reference Range:  <10.0 ng/L- Negative Female for AMI  <15.0 ng/L- Negative Male for AMI  >=10 - Abnormal Female indicating possible myocardial injury.  >=15 - Abnormal Male indicating possible myocardial injury.   Clinicians would have to utilize clinical acumen, EKG, Troponin, and serial changes to determine if it is an Acute Myocardial Infarction or myocardial injury due to an underlying chronic condition.          CBC Auto Differential [[id number]]  (Abnormal) Collected: 04/08/23 2133    Specimen: Blood Updated: 04/08/23 2214     WBC 11.80 10*3/mm3      RBC 4.30 10*6/mm3      Hemoglobin 13.0 g/dL      Hematocrit 38.2 %      MCV 88.8 fL      MCH 30.3 pg      MCHC 34.2 g/dL      RDW 13.1 %      RDW-SD 42.9 fl      MPV 9.1 fL      Platelets 256 10*3/mm3      Neutrophil % 74.6 %      Lymphocyte % 13.9 %      Monocyte % 9.2 %      Eosinophil % 1.3 %      Basophil % 1.0 %      Neutrophils, Absolute 8.80 10*3/mm3      Lymphocytes, Absolute 1.60 10*3/mm3      Monocytes, Absolute 1.10 10*3/mm3      Eosinophils, Absolute 0.10 10*3/mm3      Basophils, Absolute 0.10 10*3/mm3      nRBC 0.1 /100 WBC     Magnesium [[id number]]  (Abnormal) Collected: 04/08/23 2133    Specimen: Blood Updated: 04/08/23 2309     Magnesium 1.3 mg/dL     High Sensitivity Troponin T 2Hr [[id number]] Collected: 04/08/23 2318    Specimen: Blood Updated: 04/08/23 2322          Imaging Results (Last 24 Hours)     Procedure Component Value Units Date/Time    XR Chest 1 View [[id number]] Resulted: 04/08/23 2234     Updated: 04/08/23 2234              ECG 12 Lead Rhythm Change   Preliminary Result   HEART RATE= 77  bpm   RR Interval= 780  ms   DC Interval= 208  ms   P Horizontal Axis= -12  deg   P Front Axis= 51  deg   QRSD Interval= 91  ms   QT Interval= 420  ms   QRS Axis= 0  deg   T Wave Axis= 60  deg   - BORDERLINE ECG -   Sinus rhythm   Borderline ST depression, anterolateral leads   Electronically Signed By:    Date and Time of Study: 2023-04-08 21:47:46      ECG 12 Lead Chest Pain   Preliminary Result   HEART RATE= 163  bpm   RR Interval= 368  ms   DC Interval=   ms   P Horizontal Axis=   deg   P Front Axis= 0  deg   QRSD Interval= 93  ms   QT Interval= 298  ms   QRS Axis= 11  deg   T Wave Axis= 148  deg   - ABNORMAL ECG -   Supraventricular tachycardia   Repolarization abnormality, prob rate related   No previous ECG available for comparison   Electronically  Signed By:    Date and Time of Study: 2023-04-08 21:22:29           Assessment/Plan     Active Hospital Problems    Diagnosis  POA   • **Chest pain, unspecified type [R07.9]  Yes      Resolved Hospital Problems   No resolved problems to display.     Chest pain, unspecified type   Patient in SVT at time of admission was probed by adenosine  Continue Nitropaste  Continuous cardiac monitoring pulse oximetry  Mental oxygen as needed for hypoxia/respiratory distress to maintain oxygen saturation greater than 90%.  ASA administered in ED   Elevated high-sensitivity troponin initially 22, elevated 115, troponin T delta 93 could be due to SVT at time of admission  No acute ischemia on EKG  Cardiology consulted to evaluate and treat  Check TSH, hemoglobin A1c, and lipid panel in a.m..    Hypokalemia  Hypomagnesia  Acute  Electrolyte replacement initiated    Essential  Hypertension  Chronic   Poorly controled   Vital signs per policy   Continue home hydrochlorothiazide, clonidine, and losartan    Anxiety  Chronic   Continue home hydroxyzine     · I discussed the patient's findings and my recommendations with patient.    VTE Prophylaxis - SCDs.  Code Status - Full code.       TED Jama  [facility]   04/08/23  23:51 EDT

## 2023-04-09 NOTE — PLAN OF CARE
Goal Outcome Evaluation:  Plan of Care Reviewed With: patient        Progress: no change  Outcome Evaluation: Pt has no complaints of chest pain VSS NSR  pt has consult with cardiology this am. Will await further orders

## 2023-04-10 ENCOUNTER — APPOINTMENT (OUTPATIENT)
Dept: CARDIOLOGY | Facility: HOSPITAL | Age: 73
DRG: 247 | End: 2023-04-10
Payer: MEDICARE

## 2023-04-10 LAB
ACT BLD: 161 SECONDS (ref 89–137)
ACT BLD: 191 SECONDS (ref 89–137)
ACT BLD: 341 SECONDS (ref 89–137)
BH CV ECHO MEAS - ACS: 2.16 CM
BH CV ECHO MEAS - AO MAX PG: 11.1 MMHG
BH CV ECHO MEAS - AO MEAN PG: 5.8 MMHG
BH CV ECHO MEAS - AO ROOT DIAM: 3.5 CM
BH CV ECHO MEAS - AO V2 MAX: 166.7 CM/SEC
BH CV ECHO MEAS - AO V2 VTI: 33.4 CM
BH CV ECHO MEAS - AVA(I,D): 3.1 CM2
BH CV ECHO MEAS - EDV(CUBED): 144.6 ML
BH CV ECHO MEAS - EDV(MOD-SP4): 75.5 ML
BH CV ECHO MEAS - EF(MOD-SP4): 60.7 %
BH CV ECHO MEAS - ESV(CUBED): 46.5 ML
BH CV ECHO MEAS - ESV(MOD-SP4): 29.7 ML
BH CV ECHO MEAS - FS: 31.5 %
BH CV ECHO MEAS - IVS/LVPW: 0.9 CM
BH CV ECHO MEAS - IVSD: 1.25 CM
BH CV ECHO MEAS - LV DIASTOLIC VOL/BSA (35-75): 33.6 CM2
BH CV ECHO MEAS - LV MASS(C)D: 286.2 GRAMS
BH CV ECHO MEAS - LV MAX PG: 10.5 MMHG
BH CV ECHO MEAS - LV MEAN PG: 4.6 MMHG
BH CV ECHO MEAS - LV SYSTOLIC VOL/BSA (12-30): 13.2 CM2
BH CV ECHO MEAS - LV V1 MAX: 161.8 CM/SEC
BH CV ECHO MEAS - LV V1 VTI: 33.9 CM
BH CV ECHO MEAS - LVIDD: 5.2 CM
BH CV ECHO MEAS - LVIDS: 3.6 CM
BH CV ECHO MEAS - LVOT AREA: 3 CM2
BH CV ECHO MEAS - LVOT DIAM: 1.97 CM
BH CV ECHO MEAS - LVPWD: 1.38 CM
BH CV ECHO MEAS - MR MAX PG: 128.6 MMHG
BH CV ECHO MEAS - MR MAX VEL: 567 CM/SEC
BH CV ECHO MEAS - MV A MAX VEL: 93.4 CM/SEC
BH CV ECHO MEAS - MV DEC SLOPE: 355.5 CM/SEC2
BH CV ECHO MEAS - MV DEC TIME: 0.23 MSEC
BH CV ECHO MEAS - MV E MAX VEL: 82.5 CM/SEC
BH CV ECHO MEAS - MV E/A: 0.88
BH CV ECHO MEAS - MV MAX PG: 5.5 MMHG
BH CV ECHO MEAS - MV MEAN PG: 2.22 MMHG
BH CV ECHO MEAS - MV V2 VTI: 35.5 CM
BH CV ECHO MEAS - MVA(VTI): 2.9 CM2
BH CV ECHO MEAS - PA ACC TIME: 0.1 SEC
BH CV ECHO MEAS - PA PR(ACCEL): 35.4 MMHG
BH CV ECHO MEAS - PA V2 MAX: 98.6 CM/SEC
BH CV ECHO MEAS - PULM A REVS DUR: 0.11 SEC
BH CV ECHO MEAS - PULM A REVS VEL: 28.5 CM/SEC
BH CV ECHO MEAS - PULM DIAS VEL: 34.6 CM/SEC
BH CV ECHO MEAS - PULM S/D: 1.47
BH CV ECHO MEAS - PULM SYS VEL: 50.8 CM/SEC
BH CV ECHO MEAS - RAP SYSTOLE: 3 MMHG
BH CV ECHO MEAS - RV MAX PG: 2.05 MMHG
BH CV ECHO MEAS - RV V1 MAX: 71.6 CM/SEC
BH CV ECHO MEAS - RV V1 VTI: 13 CM
BH CV ECHO MEAS - RVDD: 2.8 CM
BH CV ECHO MEAS - RVSP: 28.2 MMHG
BH CV ECHO MEAS - SI(MOD-SP4): 20.4 ML/M2
BH CV ECHO MEAS - SV(LVOT): 102.9 ML
BH CV ECHO MEAS - SV(MOD-SP4): 45.8 ML
BH CV ECHO MEAS - TR MAX PG: 25.2 MMHG
BH CV ECHO MEAS - TR MAX VEL: 250.3 CM/SEC
GEN 5 2HR TROPONIN T REFLEX: 290 NG/L
MAXIMAL PREDICTED HEART RATE: 147 BPM
STRESS TARGET HR: 125 BPM
TROPONIN T DELTA: 34 NG/L
TROPONIN T SERPL HS-MCNC: 256 NG/L

## 2023-04-10 PROCEDURE — 99233 SBSQ HOSP IP/OBS HIGH 50: CPT | Performed by: INTERNAL MEDICINE

## 2023-04-10 PROCEDURE — C1894 INTRO/SHEATH, NON-LASER: HCPCS | Performed by: INTERNAL MEDICINE

## 2023-04-10 PROCEDURE — 99152 MOD SED SAME PHYS/QHP 5/>YRS: CPT | Performed by: INTERNAL MEDICINE

## 2023-04-10 PROCEDURE — C1725 CATH, TRANSLUMIN NON-LASER: HCPCS | Performed by: INTERNAL MEDICINE

## 2023-04-10 PROCEDURE — 84484 ASSAY OF TROPONIN QUANT: CPT | Performed by: INTERNAL MEDICINE

## 2023-04-10 PROCEDURE — 027034Z DILATION OF CORONARY ARTERY, ONE ARTERY WITH DRUG-ELUTING INTRALUMINAL DEVICE, PERCUTANEOUS APPROACH: ICD-10-PCS | Performed by: INTERNAL MEDICINE

## 2023-04-10 PROCEDURE — 92928 PRQ TCAT PLMT NTRAC ST 1 LES: CPT | Performed by: INTERNAL MEDICINE

## 2023-04-10 PROCEDURE — G0378 HOSPITAL OBSERVATION PER HR: HCPCS

## 2023-04-10 PROCEDURE — 25010000002 SULFUR HEXAFLUORIDE MICROSPH 60.7-25 MG RECONSTITUTED SUSPENSION: Performed by: INTERNAL MEDICINE

## 2023-04-10 PROCEDURE — 96360 HYDRATION IV INFUSION INIT: CPT | Performed by: INTERNAL MEDICINE

## 2023-04-10 PROCEDURE — 25510000001 IOPAMIDOL PER 1 ML: Performed by: INTERNAL MEDICINE

## 2023-04-10 PROCEDURE — 93454 CORONARY ARTERY ANGIO S&I: CPT | Performed by: INTERNAL MEDICINE

## 2023-04-10 PROCEDURE — C1887 CATHETER, GUIDING: HCPCS | Performed by: INTERNAL MEDICINE

## 2023-04-10 PROCEDURE — 36415 COLL VENOUS BLD VENIPUNCTURE: CPT | Performed by: INTERNAL MEDICINE

## 2023-04-10 PROCEDURE — 93306 TTE W/DOPPLER COMPLETE: CPT

## 2023-04-10 PROCEDURE — C1769 GUIDE WIRE: HCPCS | Performed by: INTERNAL MEDICINE

## 2023-04-10 PROCEDURE — 4A023N8 MEASUREMENT OF CARDIAC SAMPLING AND PRESSURE, BILATERAL, PERCUTANEOUS APPROACH: ICD-10-PCS | Performed by: INTERNAL MEDICINE

## 2023-04-10 PROCEDURE — 25010000002 FENTANYL CITRATE (PF) 100 MCG/2ML SOLUTION: Performed by: INTERNAL MEDICINE

## 2023-04-10 PROCEDURE — 93306 TTE W/DOPPLER COMPLETE: CPT | Performed by: INTERNAL MEDICINE

## 2023-04-10 PROCEDURE — 85347 COAGULATION TIME ACTIVATED: CPT

## 2023-04-10 PROCEDURE — 25010000002 MIDAZOLAM PER 1 MG: Performed by: INTERNAL MEDICINE

## 2023-04-10 PROCEDURE — 25010000002 HEPARIN (PORCINE) PER 1000 UNITS: Performed by: INTERNAL MEDICINE

## 2023-04-10 PROCEDURE — B2111ZZ FLUOROSCOPY OF MULTIPLE CORONARY ARTERIES USING LOW OSMOLAR CONTRAST: ICD-10-PCS | Performed by: INTERNAL MEDICINE

## 2023-04-10 PROCEDURE — C9600 PERC DRUG-EL COR STENT SING: HCPCS | Performed by: INTERNAL MEDICINE

## 2023-04-10 PROCEDURE — 96361 HYDRATE IV INFUSION ADD-ON: CPT | Performed by: INTERNAL MEDICINE

## 2023-04-10 PROCEDURE — C1874 STENT, COATED/COV W/DEL SYS: HCPCS | Performed by: INTERNAL MEDICINE

## 2023-04-10 DEVICE — XIENCE SKYPOINT™ EVEROLIMUS ELUTING CORONARY STENT SYSTEM 3.50 MM X 28 MM / RAPID-EXCHANGE
Type: IMPLANTABLE DEVICE | Site: CORONARY | Status: FUNCTIONAL
Brand: XIENCE SKYPOINT™

## 2023-04-10 RX ORDER — SODIUM CHLORIDE 9 MG/ML
75 INJECTION, SOLUTION INTRAVENOUS CONTINUOUS
Status: DISCONTINUED | OUTPATIENT
Start: 2023-04-10 | End: 2023-04-11 | Stop reason: HOSPADM

## 2023-04-10 RX ORDER — HEPARIN SODIUM 1000 [USP'U]/ML
INJECTION, SOLUTION INTRAVENOUS; SUBCUTANEOUS
Status: DISCONTINUED | OUTPATIENT
Start: 2023-04-10 | End: 2023-04-10 | Stop reason: HOSPADM

## 2023-04-10 RX ORDER — FENTANYL CITRATE 50 UG/ML
25 INJECTION, SOLUTION INTRAMUSCULAR; INTRAVENOUS ONCE
Status: DISCONTINUED | OUTPATIENT
Start: 2023-04-10 | End: 2023-04-11 | Stop reason: HOSPADM

## 2023-04-10 RX ORDER — FENTANYL CITRATE 50 UG/ML
INJECTION, SOLUTION INTRAMUSCULAR; INTRAVENOUS
Status: DISCONTINUED | OUTPATIENT
Start: 2023-04-10 | End: 2023-04-10 | Stop reason: HOSPADM

## 2023-04-10 RX ORDER — LIDOCAINE HYDROCHLORIDE 20 MG/ML
INJECTION, SOLUTION INFILTRATION; PERINEURAL
Status: DISCONTINUED | OUTPATIENT
Start: 2023-04-10 | End: 2023-04-10 | Stop reason: HOSPADM

## 2023-04-10 RX ORDER — MIDAZOLAM HYDROCHLORIDE 1 MG/ML
INJECTION INTRAMUSCULAR; INTRAVENOUS
Status: DISCONTINUED | OUTPATIENT
Start: 2023-04-10 | End: 2023-04-10 | Stop reason: HOSPADM

## 2023-04-10 RX ORDER — CLOPIDOGREL BISULFATE 75 MG/1
75 TABLET ORAL DAILY
Status: DISCONTINUED | OUTPATIENT
Start: 2023-04-11 | End: 2023-04-11 | Stop reason: HOSPADM

## 2023-04-10 RX ORDER — ATORVASTATIN CALCIUM 40 MG/1
40 TABLET, FILM COATED ORAL NIGHTLY
Status: DISCONTINUED | OUTPATIENT
Start: 2023-04-10 | End: 2023-04-11 | Stop reason: HOSPADM

## 2023-04-10 RX ORDER — MIDAZOLAM HYDROCHLORIDE 1 MG/ML
1 INJECTION INTRAMUSCULAR; INTRAVENOUS ONCE
Status: DISCONTINUED | OUTPATIENT
Start: 2023-04-10 | End: 2023-04-11 | Stop reason: HOSPADM

## 2023-04-10 RX ORDER — SODIUM CHLORIDE 9 MG/ML
250 INJECTION, SOLUTION INTRAVENOUS ONCE AS NEEDED
Status: DISCONTINUED | OUTPATIENT
Start: 2023-04-10 | End: 2023-04-11 | Stop reason: HOSPADM

## 2023-04-10 RX ORDER — ASPIRIN 81 MG/1
81 TABLET, CHEWABLE ORAL DAILY
Status: DISCONTINUED | OUTPATIENT
Start: 2023-04-11 | End: 2023-04-11 | Stop reason: HOSPADM

## 2023-04-10 RX ORDER — ACETAMINOPHEN 325 MG/1
650 TABLET ORAL EVERY 4 HOURS PRN
Status: DISCONTINUED | OUTPATIENT
Start: 2023-04-10 | End: 2023-04-10 | Stop reason: SDUPTHER

## 2023-04-10 RX ORDER — CLOPIDOGREL BISULFATE 75 MG/1
TABLET ORAL
Status: DISCONTINUED | OUTPATIENT
Start: 2023-04-10 | End: 2023-04-10 | Stop reason: HOSPADM

## 2023-04-10 RX ADMIN — PANTOPRAZOLE SODIUM 40 MG: 40 TABLET, DELAYED RELEASE ORAL at 05:59

## 2023-04-10 RX ADMIN — Medication 10 ML: at 09:00

## 2023-04-10 RX ADMIN — HYDROCHLOROTHIAZIDE 25 MG: 25 TABLET ORAL at 09:00

## 2023-04-10 RX ADMIN — HYDROXYZINE HYDROCHLORIDE 25 MG: 25 TABLET, FILM COATED ORAL at 14:09

## 2023-04-10 RX ADMIN — CLONIDINE HYDROCHLORIDE 0.1 MG: 0.1 TABLET ORAL at 09:00

## 2023-04-10 RX ADMIN — LOSARTAN POTASSIUM 100 MG: 50 TABLET, FILM COATED ORAL at 09:00

## 2023-04-10 RX ADMIN — SODIUM CHLORIDE 75 ML/HR: 9 INJECTION, SOLUTION INTRAVENOUS at 23:06

## 2023-04-10 RX ADMIN — ALUMINUM HYDROXIDE, MAGNESIUM HYDROXIDE, AND DIMETHICONE 15 ML: 400; 400; 40 SUSPENSION ORAL at 19:31

## 2023-04-10 RX ADMIN — BUPRENORPHINE HYDROCHLORIDE AND NALOXONE HYDROCHLORIDE DIHYDRATE 1 TABLET: 8; 2 TABLET SUBLINGUAL at 20:26

## 2023-04-10 RX ADMIN — NITROGLYCERIN 1 INCH: 20 OINTMENT TOPICAL at 14:00

## 2023-04-10 RX ADMIN — BUPRENORPHINE HYDROCHLORIDE AND NALOXONE HYDROCHLORIDE DIHYDRATE 1 TABLET: 8; 2 TABLET SUBLINGUAL at 09:00

## 2023-04-10 RX ADMIN — NITROGLYCERIN 1 INCH: 20 OINTMENT TOPICAL at 09:00

## 2023-04-10 RX ADMIN — SODIUM CHLORIDE 75 ML/HR: 9 INJECTION, SOLUTION INTRAVENOUS at 19:31

## 2023-04-10 RX ADMIN — SULFUR HEXAFLUORIDE 3 ML: KIT at 09:11

## 2023-04-10 RX ADMIN — HYDROXYZINE HYDROCHLORIDE 25 MG: 25 TABLET, FILM COATED ORAL at 01:53

## 2023-04-10 NOTE — CASE MANAGEMENT/SOCIAL WORK
Discharge Planning Assessment   Matt     Patient Name: Manfred Perry  MRN: [id number]  Today's Date: 4/10/2023    Admit Date: 4/8/2023    Plan: Home at discharge.   Discharge Needs Assessment     Row Name 04/10/23 1433       Living Environment    People in Home alone    Current Living Arrangements home    Primary Care Provided by self    Provides Primary Care For no one    Family Caregiver if Needed none    Able to Return to Prior Arrangements yes       Resource/Environmental Concerns    Resource/Environmental Concerns none    Transportation Concerns none       Transition Planning    Patient/Family Anticipates Transition to home    Patient/Family Anticipated Services at Transition none    Transportation Anticipated family or friend will provide       Discharge Needs Assessment    Readmission Within the Last 30 Days no previous admission in last 30 days    Equipment Currently Used at Home none    Concerns to be Addressed no discharge needs identified;denies needs/concerns at this time    Anticipated Changes Related to Illness none    Equipment Needed After Discharge none               Discharge Plan     Row Name 04/10/23 1434       Plan    Plan Home at discharge.    Patient/Family in Agreement with Plan yes    Plan Comments Patient lives at home alone. Patient drives, sister will provide transportation at discharge. Patient performs ADL. PCP and pharmacy confirmed. Denies financial assistance for medication and/or food. Denies HH and/or rehab needs. D/C barriers: pending stress test.                 Demographic Summary     Row Name 04/10/23 1431       General Information    Admission Type observation    Arrived From emergency department    Required Notices Provided Observation Status Notice    Referral Source admission list    Reason for Consult discharge planning    Preferred Language English               Functional Status     Row Name 04/10/23 1433       Functional Status    Usual Activity Tolerance good     Current Activity Tolerance good       Functional Status, IADL    Medications independent    Meal Preparation independent    Housekeeping independent    Laundry independent    Shopping independent              Met with patient in room wearing PPE: mask.     Maintained distance greater than six feet and spent less than 15 minutes in the room.      Claire Palmer RN, BSN  Obs/3C/Float   [address] [address]  Phone: [phone]  Fax: [phone]

## 2023-04-10 NOTE — PROGRESS NOTES
CARDIOLOGY PROGRESS NOTE:    Manfred Perry  72 y.o.  male  1950  [id number]      Referring Provider: Hospitalist    Reason for follow-up: Chest pain     Patient Care Team:  Piedad Maloney APRN as PCP - General (Family Medicine)    Subjective .  Patient still has chest pain on and off    Objective  Lying in bed comfortably     Review of Systems   Constitutional: Negative for fever and malaise/fatigue.   HENT: Negative for ear pain and nosebleeds.    Eyes: Negative for blurred vision and double vision.   Cardiovascular: Negative for chest pain, dyspnea on exertion and palpitations.   Respiratory: Negative for cough and shortness of breath.    Skin: Negative for rash.   Musculoskeletal: Negative for joint pain.   Gastrointestinal: Negative for abdominal pain, nausea and vomiting.   Neurological: Negative for focal weakness and headaches.   Psychiatric/Behavioral: Negative for depression. The patient is not nervous/anxious.    All other systems reviewed and are negative.      Patient has no known allergies.    Scheduled Meds:buprenorphine-naloxone, 1 tablet, Sublingual, BID  cloNIDine, 0.1 mg, Oral, BID  hydroCHLOROthiazide, 25 mg, Oral, Daily  losartan, 100 mg, Oral, Daily  nitroglycerin, 1 inch, Topical, TID - Nitrates  pantoprazole, 40 mg, Oral, Q AM  sodium chloride, 10 mL, Intravenous, Q12H      Continuous Infusions:   PRN Meds:.•  acetaminophen **OR** acetaminophen **OR** acetaminophen  •  aluminum-magnesium hydroxide-simethicone  •  hydrOXYzine  •  ketorolac  •  magnesium sulfate **OR** magnesium sulfate **OR** magnesium sulfate  •  melatonin  •  nitroglycerin  •  ondansetron **OR** ondansetron  •  potassium chloride **OR** potassium chloride **OR** potassium chloride  •  sodium chloride  •  sodium chloride        VITAL SIGNS  Vitals:    04/10/23 1131 04/10/23 1326 04/10/23 1330 04/10/23 1518   BP: 126/75 132/72 132/72 124/69   BP Location: Right arm   Right arm   Patient Position: Lying   Lying  "  Pulse: 69   60   Resp: 16   16   Temp: 98.4 °F (36.9 °C)   98.4 °F (36.9 °C)   TempSrc: Oral   Oral   SpO2: 93%   95%   Weight:  117 kg (257 lb) 117 kg (257 lb)    Height:  170.2 cm (67\") 170.2 cm (67\")        Flowsheet Rows    Flowsheet Row First Filed Value   Admission Height 170.2 cm (67\") Documented at 04/08/2023 2121   Admission Weight 120 kg (265 lb) Documented at 04/08/2023 2121           TELEMETRY: Normal sinus rhythm    Physical Exam:  Constitutional:       Appearance: Well-developed.   Eyes:      General: No scleral icterus.     Conjunctiva/sclera: Conjunctivae normal.      Pupils: Pupils are equal, round, and reactive to light.   HENT:      Head: Normocephalic and atraumatic.   Neck:      Vascular: No carotid bruit or JVD.   Pulmonary:      Effort: Pulmonary effort is normal.      Breath sounds: Normal breath sounds. No wheezing. No rales.   Cardiovascular:      Normal rate. Regular rhythm.   Pulses:     Intact distal pulses.   Abdominal:      General: Bowel sounds are normal.      Palpations: Abdomen is soft.   Musculoskeletal: Normal range of motion.      Cervical back: Normal range of motion and neck supple. Skin:     General: Skin is warm and dry.      Findings: No rash.   Neurological:      Mental Status: Alert.      Comments: No focal deficits          Results Review:   I reviewed the patient's new clinical results.  Lab Results (last 24 hours)     Procedure Component Value Units Date/Time    High Sensitivity Troponin T 2Hr [[id number]] Collected: 04/10/23 1639    Specimen: Blood Updated: 04/10/23 1648    High Sensitivity Troponin T [[id number]]  (Abnormal) Collected: 04/10/23 1130    Specimen: Blood Updated: 04/10/23 1235     HS Troponin T 256 ng/L     Narrative:      High Sensitive Troponin T Reference Range:  <10.0 ng/L- Negative Female for AMI  <15.0 ng/L- Negative Male for AMI  >=10 - Abnormal Female indicating possible myocardial injury.  >=15 - Abnormal Male indicating possible myocardial " injury.   Clinicians would have to utilize clinical acumen, EKG, Troponin, and serial changes to determine if it is an Acute Myocardial Infarction or myocardial injury due to an underlying chronic condition.         Potassium [[id number]]  (Normal) Collected: 04/09/23 2111    Specimen: Blood Updated: 04/09/23 2139     Potassium 4.5 mmol/L      Comment: Slight hemolysis detected by analyzer. Results may be affected.             Imaging Results (Last 24 Hours)     ** No results found for the last 24 hours. **          EKG      I personally viewed and interpreted the patient's EKG/Telemetry data:    ECHOCARDIOGRAM:  Results for orders placed during the hospital encounter of 04/08/23    Adult Transthoracic Echo Complete W/ Cont if Necessary Per Protocol    Interpretation Summary  •  Left ventricular ejection fraction appears to be 56 - 60%.  •  The right ventricular cavity is mildly dilated.  •  Estimated right ventricular systolic pressure from tricuspid regurgitation is normal (<35 mmHg).  •  No pericardial fusion noted       STRESS MYOVIEW:       CARDIAC CATHETERIZATION:  No results found for this or any previous visit.       OTHER:         Assessment & Plan     Principal Problem:    Chest pain, unspecified type  Active Problems:    Hypertension    Anxiety    Hypokalemia    Hypomagnesemia  Elevated troponin  SVT  Hyperlipidemia    Patient present with chest pain shortness of breath and palpitation and SVT which converted to sinus rhythm with adenosine bolus  Patient had borderline elevated troponin which is increasing and hence patient will have cardiac catheterization performed.  Initially patient was thought to have a stress my study which was canceled because of elevated troponin  Discussed with patient about procedure risks and benefits  Patient blood pressure and heart rate are stable with losartan and clonidine but I will stop the clonidine and cannot give beta-blockers because his heart rates are lower but I  will observe his heart rate and then place him on beta-blockers or nitrates  Patient is having an echocardiogram performed also  Patient's thyroid function tests are also abnormal and are followed by the primary care doctor    I discussed the patients findings and my recommendations with patient and nurse    Matias Loyola MD  04/10/23  17:22 EDT

## 2023-04-10 NOTE — PROGRESS NOTES
[facility] Medicine Services Daily Progress Note    Patient Name: Manfred Perry  : 1950  MRN: [id number]  Primary Care Physician:  Piedad Maloney APRN  Date of admission: 2023      Subjective      Chief Complaint:   Chest Pain      History of Present Illness   Mr. Perry is a 72 y.o.male with a history of hypertension, anxiety,and chronic pain that presents to [facility]  complaining of several weeks of bilateral arm pain, and a rapid heart that he states was worse this evening, he states that he has been having these episodes over the last several days to weeks, that he has been able to get them to subside with rest, he states that this evening he could not get the pain to stop and he feels like he also overly excited, prompting him to report to the emergency department for further evaluation.  He denies any exacerbating factors, or associated palpitations, shortness of breath, diaphoresis, nausea, vomiting, lightheadedness, syncope, fever, chills, cough, abdominal pain, diarrhea, constipation, or  symptoms.  He denies any previous similar symptoms, he does report high blood pressure, with recent addition of clonidine by his primary care provider. He also reports bilateral lower extremity edema that he feels has become worse over the last several days.     At time of exam patient is resting in bed comfortably, he does appear very anxious, and states that this is his normal level of anxiety.      Initial evaluation in the emergency department reveals  Vital signs /101, heart rate 165, respiratory rate 18, oxygen saturation 94% on room air, he was afebrile with a Tmax of 98.0  His initial  high-sensitivity troponin is 22, with recheck his 115, with elevated troponin T and 93 he was hyperglycemic, hypokalemic, hypomagnesia, slight elevation his white blood cells 11.80 his initial EKG upon arrival he was noted to be in SVT heart rate of 163, he was administered  12mg of adenosine that successfully brought his rate down, his repeat  EKG showed sinus rhythm with slight ST depression, heart rate 77.  He was administered 12 mg adenosine, 3 and 25 mg ASA, 5 mg Lopressor, 1 inch Nitropaste, 40 mEq of potassium, and 2 g of magnesium while in the emergency room.      Patient will be admitted to the [facility] for evaluation and treatment of chest pain and other acute and or chronic conditions.       4/9/2023 patient seen and examined in bed no acute distress, vital signs stable, discussed with RN, awaiting further work-up for his chest pain.  4/10/23 patient seen and examined in bed no acute distress, patient is confused, vital signs stable, discussed with RN.  Stress test was canceled because elevated troponin, S/P for cardiac cath:Impression and Recommendation: S/p non-STEMI  Status post successful stent implantation of a drug-eluting stent in the proximal to mid circumflex artery  Continue medical therapy and aggressive risk factor modification     ROS Review of Systems   Constitutional: Negative.    HENT: Negative.    Eyes: Negative.    Respiratory: Positive for chest tightness.    Cardiovascular: Positive for chest pain and leg swelling.   Gastrointestinal: Negative.    Endocrine: Negative.    Genitourinary: Negative.    Musculoskeletal: Negative.    Skin: Negative.    Allergic/Immunologic: Negative.    Neurological: Negative.    Hematological: Negative.    Psychiatric/Behavioral: The patient is nervous/anxious.    All other systems reviewed and are negative.     Objective      Vitals:   Temp:  [98.2 °F (36.8 °C)-99.1 °F (37.3 °C)] 99.1 °F (37.3 °C)  Heart Rate:  [63-74] 72  Resp:  [15-18] 15  BP: (130-135)/(65-77) 132/72    Physical Exam *Constitutional:       Appearance: He is obese.   HENT:      Mouth/Throat:      Mouth: Mucous membranes are dry.   Eyes:      Conjunctiva/sclera: Conjunctivae normal.   Cardiovascular:      Rate and Rhythm: Normal rate and regular  rhythm.      Pulses:           Radial pulses are 2+ on the right side and 2+ on the left side.        Dorsalis pedis pulses are 1+ on the right side and 1+ on the left side.        Posterior tibial pulses are 1+ on the right side and 1+ on the left side.      Heart sounds: Normal heart sounds.   Pulmonary:      Effort: Pulmonary effort is normal.      Breath sounds: Examination of the right-lower field reveals decreased breath sounds. Examination of the left-lower field reveals decreased breath sounds. Decreased breath sounds present.   Abdominal:      General: Bowel sounds are normal.      Palpations: Abdomen is soft.   Musculoskeletal:      Right lower le+ Pitting Edema present.      Left lower le+ Pitting Edema present.   Skin:     General: Skin is warm and dry.      Capillary Refill: Capillary refill takes less than 2 seconds.   Neurological:      General: No focal deficit present.      Mental Status: He is alert and oriented to person, place, and time.   Psychiatric:         Mood and Affect: Mood is anxious.         Speech: Speech is rapid and pressured.         Behavior: Behavior is hyperactive.            Result Review    Result Review:  I have personally reviewed the results from the time of this admission to 4/10/2023 08:17 EDT and agree with these findings:  []  Laboratory  []  Microbiology  []  Radiology  []  EKG/Telemetry   []  Cardiology/Vascular   []  Pathology  []  Old records  []  Other:  Most notable findings include:         CBC:      Lab 23  1450 23  2133   WBC 11.80* 11.80*   HEMOGLOBIN 11.3* 13.0   HEMATOCRIT 33.7* 38.2   PLATELETS 223 256   NEUTROS ABS 8.50* 8.80*   LYMPHS ABS 1.90 1.60   MONOS ABS 1.00* 1.10*   EOS ABS 0.20 0.10   MCV 89.1 88.8     CMP:        Lab 23  2111 23  1240 23  2133   SODIUM  --  136 138   POTASSIUM 4.5 4.2 3.0*   CHLORIDE  --  98 97*   CO2  --  28.0 23.0   ANION GAP  --  10.0 18.0*   BUN  --  14 17   CREATININE  --  0.85 0.99    EGFR  --  91.8 80.4   GLUCOSE  --  181* 220*   CALCIUM  --  8.9 9.1   MAGNESIUM  --   --  1.3*   TOTAL PROTEIN  --   --  8.3   ALBUMIN  --   --  4.2   GLOBULIN  --   --  4.1   ALT (SGPT)  --   --  15   AST (SGOT)  --   --  31   BILIRUBIN  --   --  0.6   ALK PHOS  --   --  101     No results found for: ACANTHNAEG, AFBCX, BPERTUSSISCX, BLOODCX  No results found for: BCIDPCR, CXREFLEX, CSFCX, CULTURETIS  No results found for: CULTURES, HSVCX, URCX  No results found for: EYECULTURE, GCCX, HSVCULTURE, LABHSV  No results found for: LEGIONELLA, MRSACX, MUMPSCX, MYCOPLASCX  No results found for: NOCARDIACX, STOOLCX  No results found for: THROATCX, UNSTIMCULT, URINECX, CULTURE, VZVCULTUR  No results found for: VIRALCULTU, WOUNDCX      Assessment & Plan      Brief Patient Summary:  Manfred Perry is a 72 y.o. male who       buprenorphine-naloxone, 1 tablet, Sublingual, BID  cloNIDine, 0.1 mg, Oral, BID  hydroCHLOROthiazide, 25 mg, Oral, Daily  losartan, 100 mg, Oral, Daily  nitroglycerin, 1 inch, Topical, TID - Nitrates  pantoprazole, 40 mg, Oral, Q AM  sodium chloride, 10 mL, Intravenous, Q12H             Active Hospital Problems:  Active Hospital Problems    Diagnosis    • **Chest pain, unspecified type    • Hypokalemia    • Hypomagnesemia    • Hypertension    • Anxiety      Chest pain, unspecified type   Patient in SVT at time of admission was probed by adenosine  Continue Nitropaste  Continuous cardiac monitoring pulse oximetry  Mental oxygen as needed for hypoxia/respiratory distress to maintain oxygen saturation greater than 90%.  ASA administered in ED   Elevated high-sensitivity troponin initially 22, elevated 115, troponin T delta 93 could be due to SVT at time of admission  No acute ischemia on EKG  Cardiology consulted >  CATH>Impression and Recommendation: S/p non-STEMI  Status post successful stent implantation of a drug-eluting stent in the proximal to mid circumflex artery  Continue medical therapy and  aggressive risk factor modification  Echocardiogram  Left ventricular ejection fraction appears to be 56 - 60%.  •  The right ventricular cavity is mildly dilated.  •  Estimated right ventricular systolic pressure from tricuspid regurgitation is normal (<35 mmHg).  •  No pericardial fusion noted    Check TSH 12.1, hemoglobin A1c 7.4, and lipid panel ..     Hypokalemia  Hypomagnesia  Acute  Electrolyte replacement initiated     Essential  Hypertension Chronic   Poorly controled   Vital signs per policy   Continue home hydrochlorothiazide, clonidine, and losartan     Anxiety Chronic   Continue home hydroxyzine      • I discussed the patient's findings and my recommendations with patient.     VTE Prophylaxis - SCDs.       DVT prophylaxis:  Mechanical DVT prophylaxis orders are present.    CODE STATUS:    Code Status (Patient has no pulse and is not breathing): CPR (Attempt to Resuscitate)  Medical Interventions (Patient has pulse or is breathing): Full Support      Disposition:  I expect patient to be discharged home.    I have utilized all available, immediate resources to obtain, update, or review the patient's current medications including all prescriptions, over-the-counter products, herbals, cannabis/cannabidiol products, and vitamin.mineral/dietary (nutritional) supplements.      Code Status (Patient has no pulse and is not breathing): CPR (Attempt to Resuscitate)  Medical Interventions (Patient has pulse or is breathing): Full Support    Next of kin of Power of :       Admission Status:  I believe this patient meets obs status.    Hospital Medicine Quality Measures for Heart Failure:            This patient has been examined wearing appropriate Personal Protective Equipment and discussed with rn. 04/10/23      Electronically signed by Omi Lane MD, 04/10/23, 08:17 EDT.  [facility] Hospitalist Team

## 2023-04-10 NOTE — PLAN OF CARE
Goal Outcome Evaluation:           Progress: improving  Outcome Evaluation: Pt denies chest pain. Pt has a myoview and an Echo scheduled for AM 4/10/23. Pt has been NPO since midnight and Nitro-paste was removed at that time. Pt received PRN medication for increased anxiety see(MAR).

## 2023-04-10 NOTE — PLAN OF CARE
Problem: Adult Inpatient Plan of Care  Goal: Absence of Hospital-Acquired Illness or Injury  Intervention: Identify and Manage Fall Risk  Recent Flowsheet Documentation  Taken 4/10/2023 1600 by Ruthann Welch RN  Safety Promotion/Fall Prevention: safety round/check completed  Taken 4/10/2023 1400 by Ruthann Welch RN  Safety Promotion/Fall Prevention: safety round/check completed  Taken 4/10/2023 1200 by Ruthann Welch RN  Safety Promotion/Fall Prevention: safety round/check completed  Taken 4/10/2023 1000 by Ruthann Welch RN  Safety Promotion/Fall Prevention: safety round/check completed  Taken 4/10/2023 0810 by Ruthann Welch RN  Safety Promotion/Fall Prevention: safety round/check completed  Intervention: Prevent Skin Injury  Recent Flowsheet Documentation  Taken 4/10/2023 1600 by Ruthann Welch RN  Body Position: position changed independently  Taken 4/10/2023 1200 by Ruthann Welch RN  Body Position: position changed independently  Taken 4/10/2023 0810 by Ruthann Welch RN  Body Position: position changed independently  Skin Protection: adhesive use limited  Intervention: Prevent and Manage VTE (Venous Thromboembolism) Risk  Recent Flowsheet Documentation  Taken 4/10/2023 1600 by Ruthann Welch RN  Activity Management: up ad aurora  Taken 4/10/2023 1200 by Ruthann Welch RN  Activity Management:   activity encouraged   up ad aurora  Taken 4/10/2023 0810 by Ruthann Welch RN  Activity Management: activity encouraged  Range of Motion: active ROM (range of motion) encouraged  Goal: Optimal Comfort and Wellbeing  Intervention: Provide Person-Centered Care  Recent Flowsheet Documentation  Taken 4/10/2023 0810 by Ruthann Welch RN  Trust Relationship/Rapport:   care explained   choices provided   questions answered   questions encouraged   reassurance provided   thoughts/feelings acknowledged   Goal Outcome Evaluation:      Patient is scheduled for a heart  catheterization this evening. Patient is NPO. No complaints of pain at this time. VSS. Will continue to monitor and will wait for further orders.

## 2023-04-11 VITALS
WEIGHT: 248.24 LBS | OXYGEN SATURATION: 100 % | HEART RATE: 88 BPM | BODY MASS INDEX: 38.96 KG/M2 | TEMPERATURE: 97.8 F | DIASTOLIC BLOOD PRESSURE: 71 MMHG | SYSTOLIC BLOOD PRESSURE: 117 MMHG | HEIGHT: 67 IN | RESPIRATION RATE: 18 BRPM

## 2023-04-11 LAB
ANION GAP SERPL CALCULATED.3IONS-SCNC: 9 MMOL/L (ref 5–15)
BUN SERPL-MCNC: 14 MG/DL (ref 8–23)
BUN/CREAT SERPL: 16.5 (ref 7–25)
CALCIUM SPEC-SCNC: 8.9 MG/DL (ref 8.6–10.5)
CHLORIDE SERPL-SCNC: 99 MMOL/L (ref 98–107)
CO2 SERPL-SCNC: 27 MMOL/L (ref 22–29)
CREAT SERPL-MCNC: 0.85 MG/DL (ref 0.76–1.27)
EGFRCR SERPLBLD CKD-EPI 2021: 91.8 ML/MIN/1.73
GLUCOSE SERPL-MCNC: 141 MG/DL (ref 65–99)
POTASSIUM SERPL-SCNC: 4.1 MMOL/L (ref 3.5–5.2)
QT INTERVAL: 443 MS
SODIUM SERPL-SCNC: 135 MMOL/L (ref 136–145)

## 2023-04-11 PROCEDURE — 80048 BASIC METABOLIC PNL TOTAL CA: CPT | Performed by: INTERNAL MEDICINE

## 2023-04-11 PROCEDURE — 93010 ELECTROCARDIOGRAM REPORT: CPT | Performed by: INTERNAL MEDICINE

## 2023-04-11 PROCEDURE — 93005 ELECTROCARDIOGRAM TRACING: CPT | Performed by: INTERNAL MEDICINE

## 2023-04-11 PROCEDURE — 99232 SBSQ HOSP IP/OBS MODERATE 35: CPT

## 2023-04-11 RX ORDER — ATORVASTATIN CALCIUM 40 MG/1
40 TABLET, FILM COATED ORAL NIGHTLY
Qty: 90 TABLET | Refills: 0 | Status: SHIPPED | OUTPATIENT
Start: 2023-04-11

## 2023-04-11 RX ORDER — ASPIRIN 81 MG/1
81 TABLET, CHEWABLE ORAL DAILY
Qty: 30 TABLET | Refills: 3 | Status: SHIPPED | OUTPATIENT
Start: 2023-04-12

## 2023-04-11 RX ORDER — CLOPIDOGREL BISULFATE 75 MG/1
75 TABLET ORAL DAILY
Qty: 30 TABLET | Refills: 3 | Status: SHIPPED | OUTPATIENT
Start: 2023-04-12

## 2023-04-11 RX ORDER — NITROGLYCERIN 0.4 MG/1
0.4 TABLET SUBLINGUAL
Qty: 30 TABLET | Refills: 12 | Status: SHIPPED | OUTPATIENT
Start: 2023-04-11

## 2023-04-11 RX ORDER — LEVOTHYROXINE SODIUM 0.05 MG/1
50 TABLET ORAL DAILY
Qty: 30 TABLET | Refills: 0 | Status: SHIPPED | OUTPATIENT
Start: 2023-04-11

## 2023-04-11 RX ORDER — MAGNESIUM OXIDE 400 MG/1
400 TABLET ORAL DAILY
Qty: 30 TABLET | Refills: 0 | Status: SHIPPED | OUTPATIENT
Start: 2023-04-11

## 2023-04-11 RX ORDER — PANTOPRAZOLE SODIUM 40 MG/1
40 TABLET, DELAYED RELEASE ORAL DAILY
Qty: 30 TABLET | Refills: 0 | Status: SHIPPED | OUTPATIENT
Start: 2023-04-11

## 2023-04-11 RX ADMIN — CLOPIDOGREL BISULFATE 75 MG: 75 TABLET ORAL at 08:45

## 2023-04-11 RX ADMIN — CLONIDINE HYDROCHLORIDE 0.1 MG: 0.1 TABLET ORAL at 01:16

## 2023-04-11 RX ADMIN — LOSARTAN POTASSIUM 100 MG: 50 TABLET, FILM COATED ORAL at 08:45

## 2023-04-11 RX ADMIN — METOPROLOL TARTRATE 25 MG: 25 TABLET, FILM COATED ORAL at 10:39

## 2023-04-11 RX ADMIN — PANTOPRAZOLE SODIUM 40 MG: 40 TABLET, DELAYED RELEASE ORAL at 05:04

## 2023-04-11 RX ADMIN — ATORVASTATIN CALCIUM 40 MG: 40 TABLET, FILM COATED ORAL at 01:16

## 2023-04-11 RX ADMIN — HYDROXYZINE HYDROCHLORIDE 25 MG: 25 TABLET, FILM COATED ORAL at 05:04

## 2023-04-11 RX ADMIN — CLONIDINE HYDROCHLORIDE 0.1 MG: 0.1 TABLET ORAL at 08:44

## 2023-04-11 RX ADMIN — HYDROCHLOROTHIAZIDE 25 MG: 25 TABLET ORAL at 08:45

## 2023-04-11 RX ADMIN — ASPIRIN 81 MG CHEWABLE TABLET 81 MG: 81 TABLET CHEWABLE at 08:44

## 2023-04-11 RX ADMIN — BUPRENORPHINE HYDROCHLORIDE AND NALOXONE HYDROCHLORIDE DIHYDRATE 1 TABLET: 8; 2 TABLET SUBLINGUAL at 08:46

## 2023-04-11 NOTE — PLAN OF CARE
Goal Outcome Evaluation:      Patient A&O x4. VSS. Has slight chronic pain, controlled with current pain regimen. Pt d/c to home, left via w/c, transported home with niece via private vehicle. Right groin sheath site soft, dressing CDI. Belongings sent with patient. Reviewed all d/c instructions, patient verbalizes understanding. Follow up appointment scheduled with Dr. Loyola.

## 2023-04-11 NOTE — DISCHARGE INSTR - ACTIVITY
No lifting more than 8lbs for 3 days  No driving for 3 days  No swimming, hot tubs, or baths until follow up appointment

## 2023-04-11 NOTE — DISCHARGE SUMMARY
[facility] Medicine [facility]  DISCHARGE SUMMARY    Patient Name: Manfred Perry  : 1950  MRN: [id number]    Date of Admission: 2023  Discharge Diagnosis: Chest pain, unspecified type   Date of Discharge:  23  Primary Care Physician: Piedad Maloney APRN    Presenting Problem:   SVT (supraventricular tachycardia) [I47.1]  Chest pain, unspecified type [R07.9]    Active and Resolved Hospital Problems:  Active Hospital Problems    Diagnosis POA   • **Chest pain, unspecified type [R07.9] Yes   • Hypokalemia [E87.6] Yes   • Hypomagnesemia [E83.42] Yes   • Hypertension [I10] Yes   • Anxiety [F41.9] Yes      Resolved Hospital Problems   No resolved problems to display.     Chest pain, unspecified type   Patient in SVT at time of admission was probed by adenosine  Continue Nitropaste  Continuous cardiac monitoring pulse oximetry  Mental oxygen as needed for hypoxia/respiratory distress to maintain oxygen saturation greater than 90%.  ASA administered in ED   Elevated high-sensitivity troponin initially 22, elevated 115, troponin T delta 93 could be due to SVT at time of admission  No acute ischemia on EKG  Cardiology consulted >  CATH>Impression and Recommendation: S/p non-STEMI  Status post successful stent implantation of a drug-eluting stent in the proximal to mid circumflex artery  Continue medical therapy and aggressive risk factor modification  Echocardiogram  Left ventricular ejection fraction appears to be 56 - 60%.  •  The right ventricular cavity is mildly dilated.  •  Estimated right ventricular systolic pressure from tricuspid regurgitation is normal (<35 mmHg).  •  No pericardial fusion noted     TSH 12.1, hemoglobin A1c 7.4, and lipid panel ..     Hypokalemia  Hypomagnesia  Acute  Electrolyte replacement initiated     Essential  Hypertension Chronic   Poorly controled   Vital signs per policy   Continue home hydrochlorothiazide, clonidine, and  losartan     Anxiety Chronic   Continue home hydroxyzine      Hypothyroids-start synthroid     Hospital Course       Mr. Perry is a 72 y.o.male with a history of hypertension, anxiety,and chronic pain that presents to [facility]  complaining of several weeks of bilateral arm pain, and a rapid heart that he states was worse this evening, he states that he has been having these episodes over the last several days to weeks, that he has been able to get them to subside with rest, he states that this evening he could not get the pain to stop and he feels like he also overly excited, prompting him to report to the emergency department for further evaluation.  He denies any exacerbating factors, or associated palpitations, shortness of breath, diaphoresis, nausea, vomiting, lightheadedness, syncope, fever, chills, cough, abdominal pain, diarrhea, constipation, or  symptoms.  He denies any previous similar symptoms, he does report high blood pressure, with recent addition of clonidine by his primary care provider. He also reports bilateral lower extremity edema that he feels has become worse over the last several days.     At time of exam patient is resting in bed comfortably, he does appear very anxious, and states that this is his normal level of anxiety.      Initial evaluation in the emergency department reveals  Vital signs BP 163/101, heart rate 165, respiratory rate 18, oxygen saturation 94% on room air, he was afebrile with a Tmax of 98.0  His initial  high-sensitivity troponin is 22, with recheck his 115, with elevated troponin T and 93 he was hyperglycemic, hypokalemic, hypomagnesia, slight elevation his white blood cells 11.80 his initial EKG upon arrival he was noted to be in SVT heart rate of 163, he was administered 12mg of adenosine that successfully brought his rate down, his repeat  EKG showed sinus rhythm with slight ST depression, heart rate 77.  He was administered 12 mg adenosine, 3 and 25  mg ASA, 5 mg Lopressor, 1 inch Nitropaste, 40 mEq of potassium, and 2 g of magnesium while in the emergency room.      Patient will be admitted to the [facility] group for evaluation and treatment of chest pain and other acute and or chronic conditions.        4/9/2023 patient seen and examined in bed no acute distress, vital signs stable, discussed with RN, awaiting further work-up for his chest pain.  4/10/23 patient seen and examined in bed no acute distress, patient is confused, vital signs stable, discussed with RN.  Stress test was canceled because elevated troponin, S/P for cardiac cath:Impression and Recommendation: S/p non-STEMI  Status post successful stent implantation of a drug-eluting stent in the proximal to mid circumflex artery  Continue medical therapy and aggressive risk factor modification   4/11/23 doing better today, will dc home condition on dc stable.      DISCHARGE Follow Up Recommendations for labs and diagnostics: follow with pcp in one week  Follow with Cardiology in 2 weeks    Reasons For Change In Medications and Indications for New Medications:   START taking:   aspirin    atorvastatin (LIPITOR)    clopidogrel (PLAVIX)    levothyroxine (Synthroid)    magnesium oxide (MAG-OX)    metoprolol tartrate (LOPRESSOR)    nitroglycerin (NITROSTAT)    pantoprazole (Protonix)   STOP taking:   cloNIDine 0.1 MG tablet (CATAPRES)    omeprazole 40 MG capsule (PrilOSEC)       Day of Discharge     Vital Signs:  Temp:  [97.8 °F (36.6 °C)-99.1 °F (37.3 °C)] 97.8 °F (36.6 °C)  Heart Rate:  [56-99] 88  Resp:  [10-20] 18  BP: (102-183)/() 117/71    Physical Exam Constitutional:       Appearance: He is obese.   HENT:      Mouth/Throat:      Mouth: Mucous membranes are dry.   Eyes:      Conjunctiva/sclera: Conjunctivae normal.   Cardiovascular:      Rate and Rhythm: Normal rate and regular rhythm.      Pulses:           Radial pulses are 2+ on the right side and 2+ on the left side.        Dorsalis  pedis pulses are 1+ on the right side and 1+ on the left side.        Posterior tibial pulses are 1+ on the right side and 1+ on the left side.      Heart sounds: Normal heart sounds.   Pulmonary:      Effort: Pulmonary effort is normal.      Breath sounds: Examination of the right-lower field reveals decreased breath sounds. Examination of the left-lower field reveals decreased breath sounds. Decreased breath sounds present.   Abdominal:      General: Bowel sounds are normal.      Palpations: Abdomen is soft.   Musculoskeletal:      Right lower le+ Pitting Edema present.      Left lower le+ Pitting Edema present.   Skin:     General: Skin is warm and dry.      Capillary Refill: Capillary refill takes less than 2 seconds.   Neurological:      General: No focal deficit present.      Mental Status: He is alert and oriented to person, place, and time.   Psychiatric:         Mood and Affect: Mood is anxious.         Speech: Speech is rapid and pressured.         Behavior: Behavior is hyperactive.       Pertinent  and/or Most Recent Results     LAB RESULTS:      Lab 23  1450 23  2133   WBC 11.80* 11.80*   HEMOGLOBIN 11.3* 13.0   HEMATOCRIT 33.7* 38.2   PLATELETS 223 256   NEUTROS ABS 8.50* 8.80*   LYMPHS ABS 1.90 1.60   MONOS ABS 1.00* 1.10*   EOS ABS 0.20 0.10   MCV 89.1 88.8   PROTIME  --  10.3   APTT  --  32.2*         Lab 23  0043 23  2111 23  1240 23  2133   SODIUM 135*  --  136 138   POTASSIUM 4.1 4.5 4.2 3.0*   CHLORIDE 99  --  98 97*   CO2 27.0  --  28.0 23.0   ANION GAP 9.0  --  10.0 18.0*   BUN 14  --  14 17   CREATININE 0.85  --  0.85 0.99   EGFR 91.8  --  91.8 80.4   GLUCOSE 141*  --  181* 220*   CALCIUM 8.9  --  8.9 9.1   MAGNESIUM  --   --   --  1.3*   HEMOGLOBIN A1C  --   --  7.40*  --    TSH  --   --  12.100*  --          Lab 23  2133   TOTAL PROTEIN 8.3   ALBUMIN 4.2   GLOBULIN 4.1   ALT (SGPT) 15   AST (SGOT) 31   BILIRUBIN 0.6   ALK PHOS 101         Lab  04/10/23  1639 04/10/23  1130 04/08/23  2318 04/08/23  2133   PROBNP  --   --   --  143.0   HSTROP T 290* 256* 115* 22*   PROTIME  --   --   --  10.3   INR  --   --   --  1.00         Lab 04/09/23  1240   CHOLESTEROL 198   LDL CHOL 136*   HDL CHOL 39*   TRIGLYCERIDES 128             Brief Urine Lab Results     None        Microbiology Results (last 10 days)     ** No results found for the last 240 hours. **          XR Chest 1 View    Result Date: 4/9/2023  Impression: Impression: 1. Borderline cardiac enlargement. 2. No active disease. Electronically Signed: Vj PARISH Xavi  4/9/2023 7:26 AM EDT  Workstation ID: [id number]              Results for orders placed during the hospital encounter of 04/08/23    Adult Transthoracic Echo Complete W/ Cont if Necessary Per Protocol    Interpretation Summary  •  Left ventricular ejection fraction appears to be 56 - 60%.  •  The right ventricular cavity is mildly dilated.  •  Estimated right ventricular systolic pressure from tricuspid regurgitation is normal (<35 mmHg).  •  No pericardial fusion noted      Labs Pending at Discharge:      Procedures Performed  Procedure(s):  Left Heart Cath and coronary angiogram  Percutaneous Coronary Intervention         Consults:   Consults     Date and Time Order Name Status Description    4/9/2023 12:33 AM Inpatient Cardiology Consult              Discharge Details        Discharge Medications      New Medications      Instructions Start Date   aspirin 81 MG chewable tablet   81 mg, Oral, Daily   Start Date: April 12, 2023     atorvastatin 40 MG tablet  Commonly known as: LIPITOR   40 mg, Oral, Nightly      clopidogrel 75 MG tablet  Commonly known as: PLAVIX   75 mg, Oral, Daily   Start Date: April 12, 2023     levothyroxine 50 MCG tablet  Commonly known as: Synthroid   50 mcg, Oral, Daily      metoprolol tartrate 25 MG tablet  Commonly known as: LOPRESSOR   25 mg, Oral, Every 12 Hours Scheduled      nitroglycerin 0.4 MG SL tablet  Commonly  known as: NITROSTAT   0.4 mg, Sublingual, Every 5 Minutes PRN, Take no more than 3 doses in 15 minutes.      pantoprazole 40 MG EC tablet  Commonly known as: Protonix   40 mg, Oral, Daily         Continue These Medications      Instructions Start Date   buprenorphine-naloxone 8-2 MG per SL tablet  Commonly known as: SUBOXONE   1 tablet, Sublingual, 2 Times Daily      hydroCHLOROthiazide 25 MG tablet  Commonly known as: HYDRODIURIL   25 mg, Oral, Daily      hydrOXYzine 25 MG tablet  Commonly known as: ATARAX   25 mg, Oral, Every 6 Hours PRN      losartan 100 MG tablet  Commonly known as: COZAAR   100 mg, Oral, Daily      ondansetron ODT 4 MG disintegrating tablet  Commonly known as: ZOFRAN-ODT   4 mg, Translingual, Every 8 Hours PRN         Stop These Medications    cloNIDine 0.1 MG tablet  Commonly known as: CATAPRES     omeprazole 40 MG capsule  Commonly known as: PrilOSEC            No Known Allergies      Discharge Disposition:   Home or Self Care    Diet:  Hospital:  Diet Order   Procedures   • Diet: Cardiac Diets, Diabetic Diets; Healthy Heart (2-3 Na+); Consistent Carbohydrate; Texture: Regular Texture (IDDSI 7); Fluid Consistency: Thin (IDDSI 0)         Discharge Activity:   Activity Instructions     Gradually Increase Activity Until at Pre-Hospitalization Level     Additional Activity Instructions:    No lifting more than 8lbs for 3 days  No driving for 3 days  No swimming, hot tubs, or baths until follow up appointment             CODE STATUS:  Code Status and Medical Interventions:   Ordered at: 04/08/23 0375     Code Status (Patient has no pulse and is not breathing):    CPR (Attempt to Resuscitate)     Medical Interventions (Patient has pulse or is breathing):    Full Support     I have utilized all available, immediate resources to obtain, update, or review the patient's current medications including all prescriptions, over-the-counter products, herbals, cannabis/cannabidiol products, and  vitamin.mineral/dietary (nutritional) supplements.      Code Status (Patient has no pulse and is not breathing): CPR (Attempt to Resuscitate)  Medical Interventions (Patient has pulse or is breathing): Full Support    Next of kin of Power of :     Admission Status:  I believe this patient meets admit status.    Hospital Medicine Quality Measures for Heart Failure:          Future Appointments   Date Time Provider Department Center   4/27/2023  3:10 PM Matias Loyola MD [facility]       Additional Instructions for the Follow-ups that You Need to Schedule     Discharge Follow-up with PCP   As directed       Currently Documented PCP:    Piedad Maloney APRN    PCP Phone Number:    [phone]     Follow Up Details: 1 week         Discharge Follow-up with Specified Provider: cardiology; 2 Weeks   As directed      To: cardiology    Follow Up: 2 Weeks               Time spent on Discharge including face to face service:  34 minutes    This patient has been examined wearing appropriate Personal Protective Equipment and discussed with rn. 04/11/23      Signature: Electronically signed by Omi Lane MD, 04/11/23, 1:04 PM EDT.*

## 2023-04-11 NOTE — PROGRESS NOTES
CARDIOLOGY PROGRESS NOTE:    Manfred Perry  72 y.o.  male  1950  [id number]      Referring Provider: Hospitalist    Reason for follow-up: Chest pain     Patient Care Team:  Piedad Maloney APRN as PCP - General (Family Medicine)    Subjective   Patient seen and examined. Labs and chart reviewed. Patient doing well today with no new complaints. Patient currently denies chest pain, dyspnea, palpitations     Objective  Patient OOB to chair resting comfortably on room air      Review of Systems   Constitutional: Negative for fever and malaise/fatigue.   HENT: Negative for ear pain and nosebleeds.    Eyes: Negative for blurred vision and double vision.   Cardiovascular: Negative for chest pain, dyspnea on exertion and palpitations.   Respiratory: Negative for cough and shortness of breath.    Skin: Negative for rash.   Musculoskeletal: Negative for joint pain.   Gastrointestinal: Negative for abdominal pain, nausea and vomiting.   Neurological: Negative for focal weakness and headaches.   Psychiatric/Behavioral: Negative for depression. The patient is not nervous/anxious.    All other systems reviewed and are negative.      Patient has no known allergies.    Scheduled Meds:aspirin, 81 mg, Oral, Daily  atorvastatin, 40 mg, Oral, Nightly  buprenorphine-naloxone, 1 tablet, Sublingual, BID  clopidogrel, 75 mg, Oral, Daily  fentaNYL citrate (PF), 25 mcg, Intravenous, Once  hydroCHLOROthiazide, 25 mg, Oral, Daily  losartan, 100 mg, Oral, Daily  metoprolol tartrate, 25 mg, Oral, Q12H  midazolam, 1 mg, Intravenous, Once  nitroglycerin, 1 inch, Topical, TID - Nitrates  pantoprazole, 40 mg, Oral, Q AM  sodium chloride, 10 mL, Intravenous, Q12H      Continuous Infusions:sodium chloride, 75 mL/hr, Last Rate: 75 mL/hr (04/11/23 0352)      PRN Meds:.•  acetaminophen **OR** acetaminophen **OR** acetaminophen  •  aluminum-magnesium hydroxide-simethicone  •  atropine  •  hydrOXYzine  •  magnesium sulfate **OR** magnesium  "sulfate **OR** magnesium sulfate  •  melatonin  •  nitroglycerin  •  ondansetron **OR** ondansetron  •  potassium chloride **OR** potassium chloride **OR** potassium chloride  •  sodium chloride  •  sodium chloride  •  sodium chloride        VITAL SIGNS  Vitals:    04/11/23 0201 04/11/23 0510 04/11/23 0844 04/11/23 1020   BP: 127/74 120/61 159/98 117/71   BP Location:  Left arm  Right arm   Patient Position:  Lying  Lying   Pulse: 60 72 88 88   Resp:  17  18   Temp:  97.8 °F (36.6 °C)  97.8 °F (36.6 °C)   TempSrc:  Oral  Oral   SpO2: 95% 92%  100%   Weight:  113 kg (248 lb 3.8 oz)     Height:           Flowsheet Rows    Flowsheet Row First Filed Value   Admission Height 170.2 cm (67\") Documented at 04/08/2023 2121   Admission Weight 120 kg (265 lb) Documented at 04/08/2023 2121           TELEMETRY: Normal sinus rhythm    Physical Exam:  Constitutional:       Appearance: Well-developed.   Eyes:      General: No scleral icterus.     Conjunctiva/sclera: Conjunctivae normal.      Pupils: Pupils are equal, round, and reactive to light.   HENT:      Head: Normocephalic and atraumatic.   Neck:      Vascular: No carotid bruit or JVD.   Pulmonary:      Effort: Pulmonary effort is normal.      Breath sounds: Normal breath sounds. No wheezing. No rales.   Cardiovascular:      Normal rate. Regular rhythm.   Pulses:     Intact distal pulses.   Abdominal:      General: Bowel sounds are normal.      Palpations: Abdomen is soft.   Musculoskeletal: Normal range of motion.      Cervical back: Normal range of motion and neck supple. Skin:     General: Skin is warm and dry.      Findings: No rash.   Neurological:      Mental Status: Alert.      Comments: No focal deficits          Results Review:   I reviewed the patient's new clinical results.  Lab Results (last 24 hours)     Procedure Component Value Units Date/Time    Basic Metabolic Panel [[id number]]  (Abnormal) Collected: 04/11/23 0043    Specimen: Blood Updated: 04/11/23 0151     " Glucose 141 mg/dL      BUN 14 mg/dL      Creatinine 0.85 mg/dL      Sodium 135 mmol/L      Potassium 4.1 mmol/L      Chloride 99 mmol/L      CO2 27.0 mmol/L      Calcium 8.9 mg/dL      BUN/Creatinine Ratio 16.5     Anion Gap 9.0 mmol/L      eGFR 91.8 mL/min/1.73     Narrative:      GFR Normal >60  Chronic Kidney Disease <60  Kidney Failure <15    The GFR formula is only valid for adults with stable renal function between ages 18 and 70.    POC Activated Clotting Time [[id number]]  (Abnormal) Collected: 04/10/23 2306    Specimen: Arterial Blood Updated: 04/10/23 2350     Activated Clotting Time  161 Seconds      Comment: Serial Number: [id number]:  [id number]       POC Activated Clotting Time [[id number]]  (Abnormal) Collected: 04/10/23 2158    Specimen: Arterial Blood Updated: 04/10/23 2203     Activated Clotting Time  191 Seconds      Comment: Serial Number: [id number]:  [id number]       POC Activated Clotting Time [[id number]]  (Abnormal) Collected: 04/10/23 1834    Specimen: Arterial Blood Updated: 04/10/23 1845     Activated Clotting Time  341 Seconds      Comment: Serial Number: [id number]:  [id number]       High Sensitivity Troponin T 2Hr [[id number]]  (Abnormal) Collected: 04/10/23 1639    Specimen: Blood Updated: 04/10/23 1723     HS Troponin T 290 ng/L      Troponin T Delta 34 ng/L     Narrative:      High Sensitive Troponin T Reference Range:  <10.0 ng/L- Negative Female for AMI  <15.0 ng/L- Negative Male for AMI  >=10 - Abnormal Female indicating possible myocardial injury.  >=15 - Abnormal Male indicating possible myocardial injury.   Clinicians would have to utilize clinical acumen, EKG, Troponin, and serial changes to determine if it is an Acute Myocardial Infarction or myocardial injury due to an underlying chronic condition.         High Sensitivity Troponin T [[id number]]  (Abnormal) Collected: 04/10/23 1130    Specimen: Blood Updated: 04/10/23 1235     HS Troponin T 256 ng/L     Narrative:      High  Sensitive Troponin T Reference Range:  <10.0 ng/L- Negative Female for AMI  <15.0 ng/L- Negative Male for AMI  >=10 - Abnormal Female indicating possible myocardial injury.  >=15 - Abnormal Male indicating possible myocardial injury.   Clinicians would have to utilize clinical acumen, EKG, Troponin, and serial changes to determine if it is an Acute Myocardial Infarction or myocardial injury due to an underlying chronic condition.               Imaging Results (Last 24 Hours)     ** No results found for the last 24 hours. **          EKG      I personally viewed and interpreted the patient's EKG/Telemetry data:    ECHOCARDIOGRAM:  Results for orders placed during the hospital encounter of 04/08/23    Adult Transthoracic Echo Complete W/ Cont if Necessary Per Protocol    Interpretation Summary  •  Left ventricular ejection fraction appears to be 56 - 60%.  •  The right ventricular cavity is mildly dilated.  •  Estimated right ventricular systolic pressure from tricuspid regurgitation is normal (<35 mmHg).  •  No pericardial fusion noted       STRESS MYOVIEW:       CARDIAC CATHETERIZATION:  No results found for this or any previous visit.       OTHER:         Assessment & Plan     Principal Problem:    Chest pain, unspecified type  Active Problems:    Hypertension    Anxiety    Hypokalemia    Hypomagnesemia  Elevated troponin  SVT  Hyperlipidemia    Patient present with chest pain, shortness of breath, palpitations, and SVT which converted to sinus rhythm with adenosine bolus  Patient had borderline elevated troponin which continues to increase therefore Myoview study canceled and patient underwent cardiac catheretization    Cardiac catheterization showed severe single vessel disease with 80-99% to LCx and 40-50% to mid/distal LAD and 30% to RCA   Patient blood pressure and heart rate are stable   Patient started on metoprolol tartrate for CAD medical therapy and better blood pressure control   Clonidine discontinued    Patient currently on losartan, aspirin, lipitor, plavix, HCTZ, and lopressor    Education provided on the importance of medication compliance for CAD s/p stent   Echocardiogram showed normal LVEF of 56-60% with mildly dilated RV  Patient's thyroid function tests are also abnormal and are followed by the primary care doctor  Patient okay to discharge from cardiology standpoint  Will follow in office within 2 weeks of discharge     I discussed the patients findings and my recommendations with patient and nurse    Teagan Garcia, TED  04/11/23  11:29 EDT

## 2023-04-11 NOTE — PLAN OF CARE
Goal Outcome Evaluation:  Plan of Care Reviewed With: patient        Progress: improving  Outcome Evaluation: Pt is having no c/o of chest pain so far this shift. Pt is post heart cath with stent placement. Pts sheath was pulled this shift, no complications so far (see flowsheets). Pt is on bedrest until 0600. Pt has been alert and oriented. Pt has been calm and cooperative. Pt does report some anxiety.  Pts VSS so far this shift. Pt is in NSR. NS going at 75 per orders (see MAR). Pt is currently attmepting to rest.      Problem: Adult Inpatient Plan of Care  Goal: Plan of Care Review  Outcome: Ongoing, Progressing  Flowsheets (Taken 4/11/2023 0357)  Progress: improving  Plan of Care Reviewed With: patient  Outcome Evaluation: Pt is having no c/o of chest pain so far this shift. Pt is post heart cath with stent placement. Pts sheath was pulled this shift, no complications so far (see flowsheets). Pt is on bedrest until 0600. Pt has been alert and oriented. Pt has been calm and cooperative. Pt does report some anxiety.  Pts VSS so far this shift. Pt is in NSR. NS going at 75 per orders (see MAR). Pt is currently attmepting to rest.  Goal: Patient-Specific Goal (Individualized)  Outcome: Ongoing, Progressing  Goal: Absence of Hospital-Acquired Illness or Injury  Outcome: Ongoing, Progressing  Intervention: Identify and Manage Fall Risk  Recent Flowsheet Documentation  Taken 4/11/2023 0212 by Vale Whaley, RN  Safety Promotion/Fall Prevention:   activity supervised   assistive device/personal items within reach   clutter free environment maintained   nonskid shoes/slippers when out of bed   room organization consistent   safety round/check completed  Taken 4/11/2023 0012 by Vale Whaley, RN  Safety Promotion/Fall Prevention:   activity supervised   assistive device/personal items within reach   clutter free environment maintained   nonskid shoes/slippers when out of bed   room organization consistent   safety  round/check completed  Taken 4/10/2023 2231 by Vale Whaley RN  Safety Promotion/Fall Prevention:   activity supervised   assistive device/personal items within reach   clutter free environment maintained   fall prevention program maintained   nonskid shoes/slippers when out of bed   room organization consistent   safety round/check completed  Taken 4/10/2023 2116 by Vale Whaley RN  Safety Promotion/Fall Prevention:   assistive device/personal items within reach   clutter free environment maintained   fall prevention program maintained   room organization consistent   safety round/check completed  Intervention: Prevent Skin Injury  Recent Flowsheet Documentation  Taken 4/11/2023 0012 by Vale Whaley RN  Body Position: supine  Skin Protection:   adhesive use limited   incontinence pads utilized  Taken 4/10/2023 2116 by Vale Whaley RN  Body Position: supine  Skin Protection: adhesive use limited  Intervention: Prevent and Manage VTE (Venous Thromboembolism) Risk  Recent Flowsheet Documentation  Taken 4/11/2023 0012 by Vale Whaley RN  Activity Management: bedrest  VTE Prevention/Management: (none, post procedure) other (see comments)  Range of Motion: (post heart cath ROM not encouraged) other (see comments)  Taken 4/10/2023 2116 by Vale Whaley RN  Activity Management: bedrest  VTE Prevention/Management: (none, post procedure) other (see comments)  Range of Motion: (post heart cath ROM not encouraged) other (see comments)  Intervention: Prevent Infection  Recent Flowsheet Documentation  Taken 4/11/2023 0212 by Vale Whaley RN  Infection Prevention:   hand hygiene promoted   personal protective equipment utilized   rest/sleep promoted   single patient room provided   environmental surveillance performed  Taken 4/11/2023 0012 by Vale Whaley RN  Infection Prevention:   hand hygiene promoted   rest/sleep promoted   personal protective equipment utilized   single patient room provided    environmental surveillance performed  Taken 4/10/2023 2231 by Vale Whaley RN  Infection Prevention:   hand hygiene promoted   personal protective equipment utilized   rest/sleep promoted   single patient room provided   environmental surveillance performed  Taken 4/10/2023 2116 by Vale Whaley RN  Infection Prevention:   hand hygiene promoted   personal protective equipment utilized   single patient room provided   environmental surveillance performed  Goal: Optimal Comfort and Wellbeing  Outcome: Ongoing, Progressing  Intervention: Monitor Pain and Promote Comfort  Recent Flowsheet Documentation  Taken 4/10/2023 2116 by Vale Whaley RN  Pain Management Interventions: pain management plan reviewed with patient/caregiver  Intervention: Provide Person-Centered Care  Recent Flowsheet Documentation  Taken 4/11/2023 0012 by Vale Whaley RN  Trust Relationship/Rapport:   care explained   choices provided  Taken 4/10/2023 2116 by Vale Whaley RN  Trust Relationship/Rapport:   care explained   choices provided  Goal: Readiness for Transition of Care  Outcome: Ongoing, Progressing     Problem: Chest Pain  Goal: Resolution of Chest Pain Symptoms  Outcome: Ongoing, Progressing

## 2023-04-12 ENCOUNTER — TELEPHONE (OUTPATIENT)
Dept: CARDIOLOGY | Facility: CLINIC | Age: 73
End: 2023-04-12
Payer: MEDICARE

## 2023-04-12 NOTE — TELEPHONE ENCOUNTER
Called patient, he had questions about magnesium and zofran told patient he would have to contact the hospital or his PCP we cannot manage those. Patient verbalized understanding. Patient also verbalized understanding he can take his bandage off after 2 days as long as there is no bleeding.

## 2023-04-12 NOTE — TELEPHONE ENCOUNTER
HAD STENT ON Monday. HAS QUESTIONS ABOUT WHEN HE CAN TAKE OFF BANDAGE. ALSO ASKING ABOUT MEDICATIONS.  NOTHING HAS BEEN CALLED IN FOR HIM TO PHARMACY.

## 2023-04-17 ENCOUNTER — TELEPHONE (OUTPATIENT)
Dept: CARDIAC REHAB | Facility: HOSPITAL | Age: 73
End: 2023-04-17
Payer: MEDICARE

## 2023-04-17 NOTE — TELEPHONE ENCOUNTER
Called regarding Cardiac Rehab.  Wants to speak with Dr Loyola first. Will await an order from Dr Loyola if pt interested.

## 2023-04-27 ENCOUNTER — OFFICE VISIT (OUTPATIENT)
Dept: CARDIOLOGY | Facility: CLINIC | Age: 73
End: 2023-04-27
Payer: MEDICARE

## 2023-04-27 VITALS
DIASTOLIC BLOOD PRESSURE: 81 MMHG | HEART RATE: 65 BPM | SYSTOLIC BLOOD PRESSURE: 132 MMHG | OXYGEN SATURATION: 96 % | WEIGHT: 255 LBS | BODY MASS INDEX: 40.02 KG/M2 | HEIGHT: 67 IN

## 2023-04-27 DIAGNOSIS — I10 PRIMARY HYPERTENSION: ICD-10-CM

## 2023-04-27 DIAGNOSIS — E11.9 TYPE 2 DIABETES MELLITUS WITHOUT COMPLICATION, WITHOUT LONG-TERM CURRENT USE OF INSULIN: ICD-10-CM

## 2023-04-27 DIAGNOSIS — I25.10 CORONARY ARTERY DISEASE INVOLVING NATIVE CORONARY ARTERY OF NATIVE HEART WITHOUT ANGINA PECTORIS: Primary | ICD-10-CM

## 2023-04-27 DIAGNOSIS — E78.00 PURE HYPERCHOLESTEROLEMIA: ICD-10-CM

## 2023-04-27 NOTE — PROGRESS NOTES
Subjective:     Encounter Date:04/27/2023      Patient ID: Manfred Perry is a 72 y.o. male.    Chief Complaint:  History of Present Illness 72-year-old white male with history of coronary disease as well as recent MI and stent placement to the circumflex artery history of hypertension hyperlipidemia diabetes presents to my office for follow-up.  Patient is currently stable without any symptoms of chest pain or shortness of breath at rest or exertion.  No complains any PND orthopnea.  No palpitation dizziness syncope he has some mild swelling of the feet.  He is take his medicines regularly but he does not smoke    The following portions of the patient's history were reviewed and updated as appropriate: allergies, current medications, past family history, past medical history, past social history, past surgical history and problem list.  Past Medical History:   Diagnosis Date   • Disease of thyroid gland    • Dysphagia 01/2023   • GERD (gastroesophageal reflux disease)    • Hypertension    • Hypokalemia 4/9/2023   • Hypomagnesemia 4/9/2023     Past Surgical History:   Procedure Laterality Date   • APPENDECTOMY     • CARDIAC CATHETERIZATION N/A 4/10/2023    Procedure: Left Heart Cath and coronary angiogram;  Surgeon: Matias Loyola MD;  Location: [facility] CATH INVASIVE LOCATION;  Service: Cardiovascular;  Laterality: N/A;   • CARDIAC CATHETERIZATION N/A 4/10/2023    Procedure: Percutaneous Coronary Intervention;  Surgeon: Matias Loyola MD;  Location: [facility] CATH INVASIVE LOCATION;  Service: Cardiovascular;  Laterality: N/A;   • ENDOSCOPY N/A 8/10/2022    Procedure: EGD WITH DILATION with 42 bougie and 12-15mm balloon to 15mm;  Surgeon: RAJAN Moctezuma MD;  Location: [facility] ENDOSCOPY;  Service: Gastroenterology;  Laterality: N/A;  esophagitis and hiatal hernia   • ENDOSCOPY N/A 11/15/2022    Procedure: ESOPHAGOGASTRODUODENOSCOPY WITH DILATION ( BOUGIE 46, );  Surgeon: RAJAN Moctezuma MD;  Location: [facility]  [facility]  Progress Note    Rhianna Ryan Patient Status:  Inpatient    10/6/1952 MRN [id number]   [facility] 4NW-A Attending Esther Baig MD   Hosp Day # 3 PCP Lala Neal MD     Subjective:  Continues to require doses of op "ENDOSCOPY;  Service: Gastroenterology;  Laterality: N/A;  HIATIAL HERNIA  GASTRITIS  ESOPHOGEAL STRICTURE   • ENDOSCOPY N/A 1/19/2023    Procedure: ESOPHAGOGASTRODUODENOSCOPY with esophageal dilation (bougie 50, 52) and biopsy x 3 areas;  Surgeon: RAJAN Moctezuma MD;  Location: [facility] ENDOSCOPY;  Service: Gastroenterology;  Laterality: N/A;  esophagitis, hiatal hernia, esophageal stricture       /81   Pulse 65   Ht 170.2 cm (67.01\")   Wt 116 kg (255 lb)   SpO2 96%   BMI 39.93 kg/m²   History reviewed. No pertinent family history.    Current Outpatient Medications:   •  aspirin 81 MG chewable tablet, Chew 1 tablet Daily., Disp: 30 tablet, Rfl: 3  •  atorvastatin (LIPITOR) 40 MG tablet, Take 1 tablet by mouth Every Night., Disp: 90 tablet, Rfl: 0  •  buprenorphine-naloxone (SUBOXONE) 8-2 MG per SL tablet, Place 1 tablet under the tongue 2 (Two) Times a Day., Disp: , Rfl:   •  clopidogrel (PLAVIX) 75 MG tablet, Take 1 tablet by mouth Daily., Disp: 30 tablet, Rfl: 3  •  hydroCHLOROthiazide (HYDRODIURIL) 25 MG tablet, Take 1 tablet by mouth Daily., Disp: , Rfl:   •  hydrOXYzine (ATARAX) 25 MG tablet, Take 1 tablet by mouth Every 6 (Six) Hours As Needed for Anxiety., Disp: 16 tablet, Rfl: 0  •  levothyroxine (Synthroid) 50 MCG tablet, Take 1 tablet by mouth Daily., Disp: 30 tablet, Rfl: 0  •  losartan (COZAAR) 100 MG tablet, Take 1 tablet by mouth Daily., Disp: , Rfl:   •  magnesium oxide (MAG-OX) 400 MG tablet, Take 1 tablet by mouth Daily., Disp: 30 tablet, Rfl: 0  •  metoprolol tartrate (LOPRESSOR) 25 MG tablet, Take 1 tablet by mouth Every 12 (Twelve) Hours., Disp: 60 tablet, Rfl: 1  •  nitroglycerin (NITROSTAT) 0.4 MG SL tablet, Place 1 tablet under the tongue Every 5 (Five) Minutes As Needed for Chest Pain (Only if SBP Greater Than 100). Take no more than 3 doses in 15 minutes., Disp: 30 tablet, Rfl: 12  •  ondansetron ODT (ZOFRAN-ODT) 4 MG disintegrating tablet, Place 1 tablet on the tongue Every 8 " due to cancer         Objective:  Blood pressure 136/61, pulse 84, temperature 98.2 °F (36.8 °C), temperature source Oral, resp. rate 18, height 5' 9\" (1.753 m), weight 188 lb 12.8 oz (85.6 kg), SpO2 98 %.       EXAM:  /61   Pulse 84   Temp 98.2 °F ( (Eight) Hours As Needed for Nausea or Vomiting., Disp: 20 tablet, Rfl: 0  •  pantoprazole (Protonix) 40 MG EC tablet, Take 1 tablet by mouth Daily., Disp: 30 tablet, Rfl: 0  No Known Allergies  Social History     Socioeconomic History   • Marital status:    Tobacco Use   • Smoking status: Never   • Smokeless tobacco: Never   Vaping Use   • Vaping Use: Never used   Substance and Sexual Activity   • Alcohol use: Not Currently   • Drug use: Never     Comment: suboxone   • Sexual activity: Defer     Review of Systems   Constitutional: Negative for malaise/fatigue.   Cardiovascular: Positive for leg swelling. Negative for chest pain, dyspnea on exertion and palpitations.   Respiratory: Negative for cough and shortness of breath.    Gastrointestinal: Negative for abdominal pain, nausea and vomiting.   Neurological: Negative for dizziness, focal weakness, headaches, light-headedness and numbness.   All other systems reviewed and are negative.             Objective:     Constitutional:       Appearance: Well-developed.   Eyes:      General: No scleral icterus.     Conjunctiva/sclera: Conjunctivae normal.   HENT:      Head: Normocephalic and atraumatic.   Neck:      Vascular: No carotid bruit or JVD.   Pulmonary:      Effort: Pulmonary effort is normal.      Breath sounds: Normal breath sounds. No wheezing. No rales.   Cardiovascular:      Normal rate. Regular rhythm.   Pulses:     Intact distal pulses.   Abdominal:      General: Bowel sounds are normal.      Palpations: Abdomen is soft.   Musculoskeletal:      Cervical back: Normal range of motion and neck supple. Skin:     General: Skin is warm and dry.      Findings: No rash.   Neurological:      Mental Status: Alert.       Procedures    Lab Review:         MDM  1.  Coronary disease  Patient has recent MI and underwent a stent placement to the circumflex artery but has about a 30 to 40% disease in the LAD and RCA and is currently stable on medications and has  normal LV function  2.  Hypertension  Patient blood pressure currently stable on metoprolol and losartan  3.  Hyperlipidemia  Patient is on Lipitor and the lipid levels are followed by the primary care doctor  4.  Diabetes  Patient is on oral medicines and followed by the primary care doctor.      Patient's previous medical records, labs, and EKG were reviewed and discussed with the patient at today's visit.

## 2023-12-20 ENCOUNTER — OFFICE VISIT (OUTPATIENT)
Dept: CARDIOLOGY | Facility: CLINIC | Age: 73
End: 2023-12-20
Payer: MEDICARE

## 2023-12-20 VITALS
DIASTOLIC BLOOD PRESSURE: 78 MMHG | BODY MASS INDEX: 41.28 KG/M2 | HEIGHT: 67 IN | OXYGEN SATURATION: 97 % | HEART RATE: 78 BPM | WEIGHT: 263 LBS | SYSTOLIC BLOOD PRESSURE: 161 MMHG

## 2023-12-20 DIAGNOSIS — I25.10 CORONARY ARTERY DISEASE INVOLVING NATIVE CORONARY ARTERY OF NATIVE HEART WITHOUT ANGINA PECTORIS: Primary | ICD-10-CM

## 2023-12-20 DIAGNOSIS — E11.9 TYPE 2 DIABETES MELLITUS WITHOUT COMPLICATION, WITHOUT LONG-TERM CURRENT USE OF INSULIN: ICD-10-CM

## 2023-12-20 DIAGNOSIS — I10 PRIMARY HYPERTENSION: ICD-10-CM

## 2023-12-20 DIAGNOSIS — E78.00 PURE HYPERCHOLESTEROLEMIA: ICD-10-CM

## 2023-12-20 RX ORDER — NITROGLYCERIN 0.4 MG/1
0.4 TABLET SUBLINGUAL
Qty: 25 TABLET | Refills: 0 | Status: SHIPPED | OUTPATIENT
Start: 2023-12-20

## 2023-12-20 RX ORDER — CLOPIDOGREL BISULFATE 75 MG/1
75 TABLET ORAL DAILY
Qty: 90 TABLET | Refills: 3 | Status: SHIPPED | OUTPATIENT
Start: 2023-12-20

## 2023-12-20 NOTE — PROGRESS NOTES
Subjective:     Encounter Date:12/20/2023      Patient ID: Manfred Perry is a 73 y.o. male.    Chief Complaint:  History of Present Illness 73-year-old white male with history of coronary status post and placement to the circumflex artery history of hypertension hyperlipidemia diabetes presents to my office for a follow-up.  Patient is currently stable without any symptoms of chest pain or shortness of breath at rest or exertion.  No complaint of any PND orthopnea.  No palpitation dizziness syncope or swelling of the feet.  Patient is taking all her medicines regularly.  Patient does not smoke.    The following portions of the patient's history were reviewed and updated as appropriate: allergies, current medications, past family history, past medical history, past social history, past surgical history, and problem list.  Past Medical History:   Diagnosis Date    Disease of thyroid gland     Dysphagia 01/2023    GERD (gastroesophageal reflux disease)     Hypertension     Hypokalemia 4/9/2023    Hypomagnesemia 4/9/2023     Past Surgical History:   Procedure Laterality Date    APPENDECTOMY      CARDIAC CATHETERIZATION N/A 4/10/2023    Procedure: Left Heart Cath and coronary angiogram;  Surgeon: Matias Loyola MD;  Location: [facility] CATH INVASIVE LOCATION;  Service: Cardiovascular;  Laterality: N/A;    CARDIAC CATHETERIZATION N/A 4/10/2023    Procedure: Percutaneous Coronary Intervention;  Surgeon: Matias Loyola MD;  Location: [facility] CATH INVASIVE LOCATION;  Service: Cardiovascular;  Laterality: N/A;    ENDOSCOPY N/A 8/10/2022    Procedure: EGD WITH DILATION with 42 bougie and 12-15mm balloon to 15mm;  Surgeon: RAJAN Moctezuma MD;  Location: [facility] ENDOSCOPY;  Service: Gastroenterology;  Laterality: N/A;  esophagitis and hiatal hernia    ENDOSCOPY N/A 11/15/2022    Procedure: ESOPHAGOGASTRODUODENOSCOPY WITH DILATION ( BOUGIE 46, );  Surgeon: RAJAN Moctezuma MD;  Location: [facility] ENDOSCOPY;  Service:  "Gastroenterology;  Laterality: N/A;  HIATIAL HERNIA  GASTRITIS  ESOPHOGEAL STRICTURE    ENDOSCOPY N/A 1/19/2023    Procedure: ESOPHAGOGASTRODUODENOSCOPY with esophageal dilation (bougie 50, 52) and biopsy x 3 areas;  Surgeon: RAJAN Moctezuma MD;  Location: [facility] ENDOSCOPY;  Service: Gastroenterology;  Laterality: N/A;  esophagitis, hiatal hernia, esophageal stricture       /78 Comment: recheck  Pulse 78   Ht 170.2 cm (67\")   Wt 119 kg (263 lb)   SpO2 97%   BMI 41.19 kg/m²   History reviewed. No pertinent family history.    Current Outpatient Medications:     aspirin 81 MG chewable tablet, Chew 1 tablet Daily., Disp: 30 tablet, Rfl: 3    atorvastatin (LIPITOR) 40 MG tablet, Take 1 tablet by mouth Every Night., Disp: 90 tablet, Rfl: 0    buprenorphine-naloxone (SUBOXONE) 8-2 MG per SL tablet, Place 1 tablet under the tongue 2 (Two) Times a Day., Disp: , Rfl:     clopidogrel (PLAVIX) 75 MG tablet, Take 1 tablet by mouth Daily., Disp: 90 tablet, Rfl: 3    hydroCHLOROthiazide (HYDRODIURIL) 25 MG tablet, Take 1 tablet by mouth Daily., Disp: , Rfl:     hydrOXYzine (ATARAX) 25 MG tablet, Take 1 tablet by mouth Every 6 (Six) Hours As Needed for Anxiety., Disp: 16 tablet, Rfl: 0    levothyroxine (Synthroid) 50 MCG tablet, Take 1 tablet by mouth Daily., Disp: 30 tablet, Rfl: 0    losartan (COZAAR) 100 MG tablet, Take 1 tablet by mouth Daily., Disp: , Rfl:     magnesium oxide (MAG-OX) 400 MG tablet, Take 1 tablet by mouth Daily., Disp: 30 tablet, Rfl: 0    metoprolol tartrate (LOPRESSOR) 25 MG tablet, Take 1 tablet by mouth Every 12 (Twelve) Hours., Disp: 180 tablet, Rfl: 3    nitroglycerin (NITROSTAT) 0.4 MG SL tablet, Place 1 tablet under the tongue Every 5 (Five) Minutes As Needed for Chest Pain (Only if SBP Greater Than 100). Take no more than 3 doses in 15 minutes., Disp: 25 tablet, Rfl: 0    ondansetron ODT (ZOFRAN-ODT) 4 MG disintegrating tablet, Place 1 tablet on the tongue Every 8 (Eight) Hours As Needed " for Nausea or Vomiting., Disp: 20 tablet, Rfl: 0    pantoprazole (Protonix) 40 MG EC tablet, Take 1 tablet by mouth Daily., Disp: 30 tablet, Rfl: 0  No Known Allergies  Social History     Socioeconomic History    Marital status:    Tobacco Use    Smoking status: Never    Smokeless tobacco: Never   Vaping Use    Vaping Use: Never used   Substance and Sexual Activity    Alcohol use: Not Currently    Drug use: Never     Comment: suboxone    Sexual activity: Defer     Review of Systems   Constitutional: Negative for malaise/fatigue.   Cardiovascular:  Negative for chest pain, dyspnea on exertion, leg swelling and palpitations.   Respiratory:  Negative for cough and shortness of breath.    Gastrointestinal:  Negative for abdominal pain, nausea and vomiting.   Neurological:  Negative for dizziness, focal weakness, headaches, light-headedness and numbness.   All other systems reviewed and are negative.             Objective:     Constitutional:       Appearance: Well-developed.   Eyes:      General: No scleral icterus.     Conjunctiva/sclera: Conjunctivae normal.   HENT:      Head: Normocephalic and atraumatic.   Neck:      Vascular: No carotid bruit or JVD.   Pulmonary:      Effort: Pulmonary effort is normal.      Breath sounds: Normal breath sounds. No wheezing. No rales.   Cardiovascular:      Normal rate. Regular rhythm.   Pulses:     Intact distal pulses.   Abdominal:      General: Bowel sounds are normal.      Palpations: Abdomen is soft.   Musculoskeletal:      Cervical back: Normal range of motion and neck supple. Skin:     General: Skin is warm and dry.      Findings: No rash.   Neurological:      Mental Status: Alert.       Procedures    Lab Review:         MDM    #1 coronary disease  Patient has stent placement to the circumflex artery and is currently stable on medications with normal function and nonobstructive disease in other arteries and is asymptomatic  2.  Hypertension  Patient blood pressure is  slightly high and he is on losartan and metoprolol but will check his blood pressure at home and he probably has whitecoat hypertension  3.  Hyperlipidemia  Patient on atorvastatin the lipid levels are well within normal limits  4.  Diabetes  Patient is on oral medicines and followed by the primary care doctor.    Patient's previous medical records, labs, and EKG were reviewed and discussed with the patient at today's visit.

## 2024-01-25 ENCOUNTER — ANESTHESIA EVENT (OUTPATIENT)
Dept: GASTROENTEROLOGY | Facility: HOSPITAL | Age: 74
End: 2024-01-25
Payer: MEDICARE

## 2024-01-25 ENCOUNTER — INPATIENT HOSPITAL (AMBULATORY)
Dept: URBAN - METROPOLITAN AREA HOSPITAL 84 | Facility: HOSPITAL | Age: 74
End: 2024-01-25
Payer: COMMERCIAL

## 2024-01-25 ENCOUNTER — ANESTHESIA (OUTPATIENT)
Dept: GASTROENTEROLOGY | Facility: HOSPITAL | Age: 74
End: 2024-01-25
Payer: MEDICARE

## 2024-01-25 ENCOUNTER — APPOINTMENT (OUTPATIENT)
Dept: CT IMAGING | Facility: HOSPITAL | Age: 74
DRG: 193 | End: 2024-01-25
Payer: MEDICARE

## 2024-01-25 ENCOUNTER — HOSPITAL ENCOUNTER (INPATIENT)
Facility: HOSPITAL | Age: 74
LOS: 1 days | Discharge: HOME | DRG: 193 | End: 2024-01-27
Attending: EMERGENCY MEDICINE | Admitting: HOSPITALIST
Payer: MEDICARE

## 2024-01-25 DIAGNOSIS — E11.65 TYPE 2 DIABETES MELLITUS WITH HYPERGLYCEMIA: ICD-10-CM

## 2024-01-25 DIAGNOSIS — J18.9 PNEUMONIA OF LEFT LOWER LOBE DUE TO INFECTIOUS ORGANISM: ICD-10-CM

## 2024-01-25 DIAGNOSIS — R13.19 ESOPHAGEAL DYSPHAGIA: ICD-10-CM

## 2024-01-25 DIAGNOSIS — K44.9 DIAPHRAGMATIC HERNIA WITHOUT OBSTRUCTION OR GANGRENE: ICD-10-CM

## 2024-01-25 DIAGNOSIS — K20.80 OTHER ESOPHAGITIS WITHOUT BLEEDING: ICD-10-CM

## 2024-01-25 DIAGNOSIS — K22.2 ESOPHAGEAL STRICTURE: ICD-10-CM

## 2024-01-25 DIAGNOSIS — K31.89 OTHER DISEASES OF STOMACH AND DUODENUM: ICD-10-CM

## 2024-01-25 DIAGNOSIS — Z79.02 LONG TERM (CURRENT) USE OF ANTITHROMBOTICS/ANTIPLATELETS: ICD-10-CM

## 2024-01-25 DIAGNOSIS — K22.2 ESOPHAGEAL OBSTRUCTION: ICD-10-CM

## 2024-01-25 DIAGNOSIS — Z79.82 LONG TERM (CURRENT) USE OF ASPIRIN: ICD-10-CM

## 2024-01-25 DIAGNOSIS — R13.10 DYSPHAGIA, UNSPECIFIED: ICD-10-CM

## 2024-01-25 DIAGNOSIS — R10.10 UPPER ABDOMINAL PAIN: Primary | ICD-10-CM

## 2024-01-25 DIAGNOSIS — R11.2 NAUSEA WITH VOMITING, UNSPECIFIED: ICD-10-CM

## 2024-01-25 DIAGNOSIS — R11.2 NAUSEA AND VOMITING, UNSPECIFIED VOMITING TYPE: ICD-10-CM

## 2024-01-25 DIAGNOSIS — R10.10 UPPER ABDOMINAL PAIN, UNSPECIFIED: ICD-10-CM

## 2024-01-25 LAB
ALBUMIN SERPL-MCNC: 4 G/DL (ref 3.5–5.2)
ALBUMIN/GLOB SERPL: 1.1 G/DL
ALP SERPL-CCNC: 115 U/L (ref 39–117)
ALT SERPL W P-5'-P-CCNC: 21 U/L (ref 1–41)
ANION GAP SERPL CALCULATED.3IONS-SCNC: 12 MMOL/L (ref 5–15)
AST SERPL-CCNC: 35 U/L (ref 1–40)
BASOPHILS # BLD AUTO: 0.1 10*3/MM3 (ref 0–0.2)
BASOPHILS NFR BLD AUTO: 0.5 % (ref 0–1.5)
BILIRUB SERPL-MCNC: 1.1 MG/DL (ref 0–1.2)
BUN SERPL-MCNC: 23 MG/DL (ref 8–23)
BUN/CREAT SERPL: 20.5 (ref 7–25)
CALCIUM SPEC-SCNC: 8.6 MG/DL (ref 8.6–10.5)
CHLORIDE SERPL-SCNC: 94 MMOL/L (ref 98–107)
CO2 SERPL-SCNC: 25 MMOL/L (ref 22–29)
CREAT SERPL-MCNC: 1.12 MG/DL (ref 0.76–1.27)
D-LACTATE SERPL-SCNC: 1.4 MMOL/L (ref 0.3–2)
DEPRECATED RDW RBC AUTO: 45.1 FL (ref 37–54)
EGFRCR SERPLBLD CKD-EPI 2021: 68.9 ML/MIN/1.73
EOSINOPHIL # BLD AUTO: 0 10*3/MM3 (ref 0–0.4)
EOSINOPHIL NFR BLD AUTO: 0 % (ref 0.3–6.2)
ERYTHROCYTE [DISTWIDTH] IN BLOOD BY AUTOMATED COUNT: 13.8 % (ref 12.3–15.4)
FLUAV SUBTYP SPEC NAA+PROBE: NOT DETECTED
FLUBV RNA ISLT QL NAA+PROBE: NOT DETECTED
GLOBULIN UR ELPH-MCNC: 3.8 GM/DL
GLUCOSE BLDC GLUCOMTR-MCNC: 194 MG/DL (ref 70–105)
GLUCOSE BLDC GLUCOMTR-MCNC: 276 MG/DL (ref 70–105)
GLUCOSE BLDC GLUCOMTR-MCNC: 311 MG/DL (ref 70–105)
GLUCOSE BLDC GLUCOMTR-MCNC: 338 MG/DL (ref 70–105)
GLUCOSE BLDC GLUCOMTR-MCNC: 372 MG/DL (ref 70–105)
GLUCOSE BLDC GLUCOMTR-MCNC: 384 MG/DL (ref 70–105)
GLUCOSE SERPL-MCNC: 423 MG/DL (ref 65–99)
HBA1C MFR BLD: 11.4 % (ref 4.8–5.6)
HCT VFR BLD AUTO: 39.7 % (ref 37.5–51)
HGB BLD-MCNC: 13.3 G/DL (ref 13–17.7)
LIPASE SERPL-CCNC: 24 U/L (ref 13–60)
LYMPHOCYTES # BLD AUTO: 0.2 10*3/MM3 (ref 0.7–3.1)
LYMPHOCYTES NFR BLD AUTO: 1.9 % (ref 19.6–45.3)
MCH RBC QN AUTO: 29.8 PG (ref 26.6–33)
MCHC RBC AUTO-ENTMCNC: 33.5 G/DL (ref 31.5–35.7)
MCV RBC AUTO: 88.9 FL (ref 79–97)
MONOCYTES # BLD AUTO: 0.3 10*3/MM3 (ref 0.1–0.9)
MONOCYTES NFR BLD AUTO: 2.4 % (ref 5–12)
NEUTROPHILS NFR BLD AUTO: 11.5 10*3/MM3 (ref 1.7–7)
NEUTROPHILS NFR BLD AUTO: 95.2 % (ref 42.7–76)
NRBC BLD AUTO-RTO: 0 /100 WBC (ref 0–0.2)
PLATELET # BLD AUTO: 228 10*3/MM3 (ref 140–450)
PMV BLD AUTO: 9.3 FL (ref 6–12)
POTASSIUM SERPL-SCNC: 4.5 MMOL/L (ref 3.5–5.2)
PROT SERPL-MCNC: 7.8 G/DL (ref 6–8.5)
QT INTERVAL: 448 MS
QTC INTERVAL: 547 MS
RBC # BLD AUTO: 4.46 10*6/MM3 (ref 4.14–5.8)
SARS-COV-2 RNA RESP QL NAA+PROBE: NOT DETECTED
SODIUM SERPL-SCNC: 131 MMOL/L (ref 136–145)
TROPONIN T SERPL HS-MCNC: 9 NG/L
TSH SERPL DL<=0.05 MIU/L-ACNC: 8.97 UIU/ML (ref 0.27–4.2)
WBC NRBC COR # BLD AUTO: 12.1 10*3/MM3 (ref 3.4–10.8)

## 2024-01-25 PROCEDURE — 43249 ESOPH EGD DILATION <30 MM: CPT | Performed by: INTERNAL MEDICINE

## 2024-01-25 PROCEDURE — 82948 REAGENT STRIP/BLOOD GLUCOSE: CPT

## 2024-01-25 PROCEDURE — 83690 ASSAY OF LIPASE: CPT | Performed by: EMERGENCY MEDICINE

## 2024-01-25 PROCEDURE — 25810000003 LACTATED RINGERS PER 1000 ML: Performed by: EMERGENCY MEDICINE

## 2024-01-25 PROCEDURE — 80053 COMPREHEN METABOLIC PANEL: CPT | Performed by: NURSE PRACTITIONER

## 2024-01-25 PROCEDURE — C1726 CATH, BAL DIL, NON-VASCULAR: HCPCS | Performed by: INTERNAL MEDICINE

## 2024-01-25 PROCEDURE — G0378 HOSPITAL OBSERVATION PER HR: HCPCS

## 2024-01-25 PROCEDURE — 93005 ELECTROCARDIOGRAM TRACING: CPT | Performed by: EMERGENCY MEDICINE

## 2024-01-25 PROCEDURE — 25810000003 SODIUM CHLORIDE 0.9 % SOLUTION 250 ML FLEX CONT: Performed by: EMERGENCY MEDICINE

## 2024-01-25 PROCEDURE — 82948 REAGENT STRIP/BLOOD GLUCOSE: CPT | Performed by: NURSE PRACTITIONER

## 2024-01-25 PROCEDURE — 25010000002 PROPOFOL 200 MG/20ML EMULSION: Performed by: NURSE ANESTHETIST, CERTIFIED REGISTERED

## 2024-01-25 PROCEDURE — 25510000001 IOPAMIDOL PER 1 ML: Performed by: EMERGENCY MEDICINE

## 2024-01-25 PROCEDURE — 83605 ASSAY OF LACTIC ACID: CPT

## 2024-01-25 PROCEDURE — 63710000001 INSULIN GLARGINE PER 5 UNITS: Performed by: NURSE PRACTITIONER

## 2024-01-25 PROCEDURE — 87040 BLOOD CULTURE FOR BACTERIA: CPT | Performed by: EMERGENCY MEDICINE

## 2024-01-25 PROCEDURE — 25810000003 SODIUM CHLORIDE 0.9 % SOLUTION: Performed by: NURSE ANESTHETIST, CERTIFIED REGISTERED

## 2024-01-25 PROCEDURE — 63710000001 INSULIN LISPRO (HUMAN) PER 5 UNITS: Performed by: EMERGENCY MEDICINE

## 2024-01-25 PROCEDURE — 80053 COMPREHEN METABOLIC PANEL: CPT | Performed by: EMERGENCY MEDICINE

## 2024-01-25 PROCEDURE — 0D748ZZ DILATION OF ESOPHAGOGASTRIC JUNCTION, VIA NATURAL OR ARTIFICIAL OPENING ENDOSCOPIC: ICD-10-PCS | Performed by: INTERNAL MEDICINE

## 2024-01-25 PROCEDURE — 25010000002 ONDANSETRON PER 1 MG: Performed by: EMERGENCY MEDICINE

## 2024-01-25 PROCEDURE — 85025 COMPLETE CBC W/AUTO DIFF WBC: CPT | Performed by: NURSE PRACTITIONER

## 2024-01-25 PROCEDURE — 25010000002 AZITHROMYCIN PER 500 MG: Performed by: EMERGENCY MEDICINE

## 2024-01-25 PROCEDURE — 99222 1ST HOSP IP/OBS MODERATE 55: CPT | Performed by: NURSE PRACTITIONER

## 2024-01-25 PROCEDURE — 25010000002 HYDROMORPHONE 1 MG/ML SOLUTION: Performed by: EMERGENCY MEDICINE

## 2024-01-25 PROCEDURE — 83036 HEMOGLOBIN GLYCOSYLATED A1C: CPT | Performed by: NURSE PRACTITIONER

## 2024-01-25 PROCEDURE — 87636 SARSCOV2 & INF A&B AMP PRB: CPT | Performed by: EMERGENCY MEDICINE

## 2024-01-25 PROCEDURE — 43239 EGD BIOPSY SINGLE/MULTIPLE: CPT | Mod: 59 | Performed by: INTERNAL MEDICINE

## 2024-01-25 PROCEDURE — 0DB78ZX EXCISION OF STOMACH, PYLORUS, VIA NATURAL OR ARTIFICIAL OPENING ENDOSCOPIC, DIAGNOSTIC: ICD-10-PCS | Performed by: INTERNAL MEDICINE

## 2024-01-25 PROCEDURE — 85025 COMPLETE CBC W/AUTO DIFF WBC: CPT | Performed by: EMERGENCY MEDICINE

## 2024-01-25 PROCEDURE — 74177 CT ABD & PELVIS W/CONTRAST: CPT

## 2024-01-25 PROCEDURE — 84484 ASSAY OF TROPONIN QUANT: CPT | Performed by: EMERGENCY MEDICINE

## 2024-01-25 PROCEDURE — 25010000002 CEFTRIAXONE PER 250 MG: Performed by: EMERGENCY MEDICINE

## 2024-01-25 PROCEDURE — 84443 ASSAY THYROID STIM HORMONE: CPT | Performed by: NURSE PRACTITIONER

## 2024-01-25 PROCEDURE — 36415 COLL VENOUS BLD VENIPUNCTURE: CPT

## 2024-01-25 PROCEDURE — 99285 EMERGENCY DEPT VISIT HI MDM: CPT

## 2024-01-25 PROCEDURE — 63710000001 INSULIN LISPRO (HUMAN) PER 5 UNITS: Performed by: NURSE PRACTITIONER

## 2024-01-25 PROCEDURE — 88305 TISSUE EXAM BY PATHOLOGIST: CPT | Performed by: INTERNAL MEDICINE

## 2024-01-25 RX ORDER — INSULIN LISPRO 100 [IU]/ML
1-200 INJECTION, SOLUTION INTRAVENOUS; SUBCUTANEOUS AS NEEDED
Status: DISCONTINUED | OUTPATIENT
Start: 2024-01-25 | End: 2024-01-27 | Stop reason: HOSPADM

## 2024-01-25 RX ORDER — IPRATROPIUM BROMIDE AND ALBUTEROL SULFATE 2.5; .5 MG/3ML; MG/3ML
3 SOLUTION RESPIRATORY (INHALATION) EVERY 4 HOURS PRN
Status: DISCONTINUED | OUTPATIENT
Start: 2024-01-25 | End: 2024-01-27 | Stop reason: HOSPADM

## 2024-01-25 RX ORDER — SODIUM CHLORIDE 0.9 % (FLUSH) 0.9 %
10 SYRINGE (ML) INJECTION EVERY 12 HOURS SCHEDULED
Status: DISCONTINUED | OUTPATIENT
Start: 2024-01-25 | End: 2024-01-27 | Stop reason: HOSPADM

## 2024-01-25 RX ORDER — QUETIAPINE FUMARATE 25 MG/1
50 TABLET, FILM COATED ORAL NIGHTLY
Status: DISCONTINUED | OUTPATIENT
Start: 2024-01-25 | End: 2024-01-27 | Stop reason: HOSPADM

## 2024-01-25 RX ORDER — IBUPROFEN 600 MG/1
1 TABLET ORAL
Status: DISCONTINUED | OUTPATIENT
Start: 2024-01-25 | End: 2024-01-27 | Stop reason: HOSPADM

## 2024-01-25 RX ORDER — POLYETHYLENE GLYCOL 3350 17 G/17G
17 POWDER, FOR SOLUTION ORAL DAILY PRN
Status: DISCONTINUED | OUTPATIENT
Start: 2024-01-25 | End: 2024-01-27 | Stop reason: HOSPADM

## 2024-01-25 RX ORDER — PANTOPRAZOLE SODIUM 40 MG/1
40 TABLET, DELAYED RELEASE ORAL
Status: DISCONTINUED | OUTPATIENT
Start: 2024-01-25 | End: 2024-01-27 | Stop reason: HOSPADM

## 2024-01-25 RX ORDER — SODIUM CHLORIDE 0.9 % (FLUSH) 0.9 %
10 SYRINGE (ML) INJECTION AS NEEDED
Status: DISCONTINUED | OUTPATIENT
Start: 2024-01-25 | End: 2024-01-27 | Stop reason: HOSPADM

## 2024-01-25 RX ORDER — INSULIN LISPRO 100 [IU]/ML
8 INJECTION, SOLUTION INTRAVENOUS; SUBCUTANEOUS ONCE
Status: COMPLETED | OUTPATIENT
Start: 2024-01-25 | End: 2024-01-25

## 2024-01-25 RX ORDER — HYDROXYZINE HYDROCHLORIDE 25 MG/1
25 TABLET, FILM COATED ORAL DAILY
Status: DISCONTINUED | OUTPATIENT
Start: 2024-01-25 | End: 2024-01-27 | Stop reason: HOSPADM

## 2024-01-25 RX ORDER — ONDANSETRON 2 MG/ML
4 INJECTION INTRAMUSCULAR; INTRAVENOUS EVERY 6 HOURS PRN
Status: DISCONTINUED | OUTPATIENT
Start: 2024-01-25 | End: 2024-01-27 | Stop reason: HOSPADM

## 2024-01-25 RX ORDER — ATORVASTATIN CALCIUM 40 MG/1
40 TABLET, FILM COATED ORAL NIGHTLY
Status: DISCONTINUED | OUTPATIENT
Start: 2024-01-25 | End: 2024-01-27 | Stop reason: HOSPADM

## 2024-01-25 RX ORDER — METOCLOPRAMIDE 10 MG/1
10 TABLET ORAL
Status: DISCONTINUED | OUTPATIENT
Start: 2024-01-25 | End: 2024-01-26

## 2024-01-25 RX ORDER — ACETAMINOPHEN 325 MG/1
650 TABLET ORAL EVERY 4 HOURS PRN
Status: DISCONTINUED | OUTPATIENT
Start: 2024-01-25 | End: 2024-01-27 | Stop reason: HOSPADM

## 2024-01-25 RX ORDER — PROPOFOL 10 MG/ML
INJECTION, EMULSION INTRAVENOUS AS NEEDED
Status: DISCONTINUED | OUTPATIENT
Start: 2024-01-25 | End: 2024-01-25 | Stop reason: SURG

## 2024-01-25 RX ORDER — SODIUM CHLORIDE 9 MG/ML
40 INJECTION, SOLUTION INTRAVENOUS AS NEEDED
Status: DISCONTINUED | OUTPATIENT
Start: 2024-01-25 | End: 2024-01-27 | Stop reason: HOSPADM

## 2024-01-25 RX ORDER — SODIUM CHLORIDE 9 MG/ML
INJECTION, SOLUTION INTRAVENOUS CONTINUOUS PRN
Status: DISCONTINUED | OUTPATIENT
Start: 2024-01-25 | End: 2024-01-25 | Stop reason: SURG

## 2024-01-25 RX ORDER — LIDOCAINE HYDROCHLORIDE 20 MG/ML
INJECTION, SOLUTION EPIDURAL; INFILTRATION; INTRACAUDAL; PERINEURAL AS NEEDED
Status: DISCONTINUED | OUTPATIENT
Start: 2024-01-25 | End: 2024-01-25 | Stop reason: SURG

## 2024-01-25 RX ORDER — INSULIN LISPRO 100 [IU]/ML
1-200 INJECTION, SOLUTION INTRAVENOUS; SUBCUTANEOUS
Status: DISCONTINUED | OUTPATIENT
Start: 2024-01-25 | End: 2024-01-27 | Stop reason: HOSPADM

## 2024-01-25 RX ORDER — BISACODYL 5 MG/1
5 TABLET, DELAYED RELEASE ORAL DAILY PRN
Status: DISCONTINUED | OUTPATIENT
Start: 2024-01-25 | End: 2024-01-27 | Stop reason: HOSPADM

## 2024-01-25 RX ORDER — FAMOTIDINE 10 MG/ML
20 INJECTION, SOLUTION INTRAVENOUS ONCE
Status: COMPLETED | OUTPATIENT
Start: 2024-01-25 | End: 2024-01-25

## 2024-01-25 RX ORDER — BUPRENORPHINE HYDROCHLORIDE AND NALOXONE HYDROCHLORIDE DIHYDRATE 8; 2 MG/1; MG/1
1 TABLET SUBLINGUAL 2 TIMES DAILY
Status: DISCONTINUED | OUTPATIENT
Start: 2024-01-25 | End: 2024-01-27 | Stop reason: HOSPADM

## 2024-01-25 RX ORDER — LEVOTHYROXINE SODIUM 0.05 MG/1
50 TABLET ORAL DAILY
Status: DISCONTINUED | OUTPATIENT
Start: 2024-01-26 | End: 2024-01-27 | Stop reason: HOSPADM

## 2024-01-25 RX ORDER — ONDANSETRON 2 MG/ML
4 INJECTION INTRAMUSCULAR; INTRAVENOUS ONCE
Status: COMPLETED | OUTPATIENT
Start: 2024-01-25 | End: 2024-01-25

## 2024-01-25 RX ORDER — SUCRALFATE 1 G/1
1 TABLET ORAL
Status: DISCONTINUED | OUTPATIENT
Start: 2024-01-25 | End: 2024-01-27 | Stop reason: HOSPADM

## 2024-01-25 RX ORDER — AMOXICILLIN 250 MG
2 CAPSULE ORAL 2 TIMES DAILY
Status: DISCONTINUED | OUTPATIENT
Start: 2024-01-25 | End: 2024-01-27 | Stop reason: HOSPADM

## 2024-01-25 RX ORDER — HYDROXYZINE HYDROCHLORIDE 25 MG/1
1-2 TABLET, FILM COATED ORAL DAILY
COMMUNITY

## 2024-01-25 RX ORDER — NICOTINE POLACRILEX 4 MG
15 LOZENGE BUCCAL
Status: DISCONTINUED | OUTPATIENT
Start: 2024-01-25 | End: 2024-01-27 | Stop reason: HOSPADM

## 2024-01-25 RX ORDER — CLOPIDOGREL BISULFATE 75 MG/1
75 TABLET ORAL DAILY
Status: DISCONTINUED | OUTPATIENT
Start: 2024-01-25 | End: 2024-01-27 | Stop reason: HOSPADM

## 2024-01-25 RX ORDER — LOSARTAN POTASSIUM 50 MG/1
100 TABLET ORAL DAILY
Status: DISCONTINUED | OUTPATIENT
Start: 2024-01-25 | End: 2024-01-27 | Stop reason: HOSPADM

## 2024-01-25 RX ORDER — POLYETHYLENE GLYCOL 3350 17 G/17G
17 POWDER, FOR SOLUTION ORAL DAILY
Status: DISCONTINUED | OUTPATIENT
Start: 2024-01-25 | End: 2024-01-27 | Stop reason: HOSPADM

## 2024-01-25 RX ORDER — QUETIAPINE FUMARATE 100 MG/1
1-2 TABLET, FILM COATED ORAL NIGHTLY
COMMUNITY

## 2024-01-25 RX ORDER — ASPIRIN 81 MG/1
81 TABLET, CHEWABLE ORAL DAILY
Status: DISCONTINUED | OUTPATIENT
Start: 2024-01-25 | End: 2024-01-27 | Stop reason: HOSPADM

## 2024-01-25 RX ORDER — DEXTROSE MONOHYDRATE 25 G/50ML
10-50 INJECTION, SOLUTION INTRAVENOUS
Status: DISCONTINUED | OUTPATIENT
Start: 2024-01-25 | End: 2024-01-27 | Stop reason: HOSPADM

## 2024-01-25 RX ORDER — SODIUM CHLORIDE, SODIUM LACTATE, POTASSIUM CHLORIDE, CALCIUM CHLORIDE 600; 310; 30; 20 MG/100ML; MG/100ML; MG/100ML; MG/100ML
125 INJECTION, SOLUTION INTRAVENOUS CONTINUOUS
Status: DISCONTINUED | OUTPATIENT
Start: 2024-01-25 | End: 2024-01-27 | Stop reason: HOSPADM

## 2024-01-25 RX ORDER — BISACODYL 10 MG
10 SUPPOSITORY, RECTAL RECTAL DAILY PRN
Status: DISCONTINUED | OUTPATIENT
Start: 2024-01-25 | End: 2024-01-27 | Stop reason: HOSPADM

## 2024-01-25 RX ADMIN — DEXMEDETOMIDINE HYDROCHLORIDE 10 MCG: 100 INJECTION, SOLUTION INTRAVENOUS at 13:36

## 2024-01-25 RX ADMIN — ONDANSETRON 4 MG: 2 INJECTION INTRAMUSCULAR; INTRAVENOUS at 02:38

## 2024-01-25 RX ADMIN — INSULIN LISPRO 1 UNITS: 100 INJECTION, SOLUTION INTRAVENOUS; SUBCUTANEOUS at 22:40

## 2024-01-25 RX ADMIN — CLOPIDOGREL BISULFATE 75 MG: 75 TABLET ORAL at 22:40

## 2024-01-25 RX ADMIN — ASPIRIN 81 MG CHEWABLE TABLET 81 MG: 81 TABLET CHEWABLE at 22:39

## 2024-01-25 RX ADMIN — INSULIN GLARGINE 28 UNITS: 100 INJECTION, SOLUTION SUBCUTANEOUS at 22:40

## 2024-01-25 RX ADMIN — HYDROXYZINE HYDROCHLORIDE 25 MG: 25 TABLET, FILM COATED ORAL at 22:39

## 2024-01-25 RX ADMIN — PROPOFOL 30 MG: 10 INJECTION, EMULSION INTRAVENOUS at 13:42

## 2024-01-25 RX ADMIN — PROPOFOL 30 MG: 10 INJECTION, EMULSION INTRAVENOUS at 13:45

## 2024-01-25 RX ADMIN — FAMOTIDINE 20 MG: 10 INJECTION INTRAVENOUS at 02:38

## 2024-01-25 RX ADMIN — METOCLOPRAMIDE 10 MG: 10 TABLET ORAL at 18:08

## 2024-01-25 RX ADMIN — LOSARTAN POTASSIUM 100 MG: 50 TABLET, FILM COATED ORAL at 22:40

## 2024-01-25 RX ADMIN — SODIUM CHLORIDE, POTASSIUM CHLORIDE, SODIUM LACTATE AND CALCIUM CHLORIDE 125 ML/HR: 600; 310; 30; 20 INJECTION, SOLUTION INTRAVENOUS at 22:07

## 2024-01-25 RX ADMIN — SUCRALFATE 1 G: 1 TABLET ORAL at 22:40

## 2024-01-25 RX ADMIN — SODIUM CHLORIDE, POTASSIUM CHLORIDE, SODIUM LACTATE AND CALCIUM CHLORIDE 125 ML/HR: 600; 310; 30; 20 INJECTION, SOLUTION INTRAVENOUS at 15:05

## 2024-01-25 RX ADMIN — CEFTRIAXONE 2000 MG: 2 INJECTION, POWDER, FOR SOLUTION INTRAMUSCULAR; INTRAVENOUS at 05:40

## 2024-01-25 RX ADMIN — Medication 400 MG: at 22:40

## 2024-01-25 RX ADMIN — SODIUM CHLORIDE, POTASSIUM CHLORIDE, SODIUM LACTATE AND CALCIUM CHLORIDE 125 ML/HR: 600; 310; 30; 20 INJECTION, SOLUTION INTRAVENOUS at 02:37

## 2024-01-25 RX ADMIN — BUPRENORPHINE HYDROCHLORIDE AND NALOXONE HYDROCHLORIDE DIHYDRATE 1 TABLET: 8; 2 TABLET SUBLINGUAL at 22:40

## 2024-01-25 RX ADMIN — INSULIN LISPRO 8 UNITS: 100 INJECTION, SOLUTION INTRAVENOUS; SUBCUTANEOUS at 03:51

## 2024-01-25 RX ADMIN — SODIUM CHLORIDE: 9 INJECTION, SOLUTION INTRAVENOUS at 13:32

## 2024-01-25 RX ADMIN — PANTOPRAZOLE SODIUM 40 MG: 40 TABLET, DELAYED RELEASE ORAL at 18:08

## 2024-01-25 RX ADMIN — POLYETHYLENE GLYCOL 3350 17 G: 17 POWDER, FOR SOLUTION ORAL at 18:08

## 2024-01-25 RX ADMIN — IOPAMIDOL 100 ML: 755 INJECTION, SOLUTION INTRAVENOUS at 04:31

## 2024-01-25 RX ADMIN — AZITHROMYCIN MONOHYDRATE 500 MG: 500 INJECTION, POWDER, LYOPHILIZED, FOR SOLUTION INTRAVENOUS at 11:15

## 2024-01-25 RX ADMIN — ATORVASTATIN CALCIUM 40 MG: 40 TABLET, FILM COATED ORAL at 22:39

## 2024-01-25 RX ADMIN — DOCUSATE SODIUM 50MG AND SENNOSIDES 8.6MG 2 TABLET: 8.6; 5 TABLET, FILM COATED ORAL at 22:40

## 2024-01-25 RX ADMIN — METOPROLOL TARTRATE 25 MG: 25 TABLET, FILM COATED ORAL at 22:40

## 2024-01-25 RX ADMIN — SUCRALFATE 1 G: 1 TABLET ORAL at 18:08

## 2024-01-25 RX ADMIN — INSULIN LISPRO 6 UNITS: 100 INJECTION, SOLUTION INTRAVENOUS; SUBCUTANEOUS at 15:34

## 2024-01-25 RX ADMIN — LIDOCAINE HYDROCHLORIDE 100 MG: 20 INJECTION, SOLUTION EPIDURAL; INFILTRATION; INTRACAUDAL; PERINEURAL at 13:38

## 2024-01-25 RX ADMIN — HYDROMORPHONE HYDROCHLORIDE 0.5 MG: 1 INJECTION, SOLUTION INTRAMUSCULAR; INTRAVENOUS; SUBCUTANEOUS at 03:44

## 2024-01-25 RX ADMIN — PROPOFOL 80 MG: 10 INJECTION, EMULSION INTRAVENOUS at 13:38

## 2024-01-25 NOTE — ED NOTES
Nursing report ED to floor  Manfred Perry  73 y.o.  male    HPI:   Chief Complaint   Patient presents with    Weakness - Generalized       Admitting doctor:   Josette Thomas MD    Admitting diagnosis:   The primary encounter diagnosis was Upper abdominal pain. Diagnoses of Pneumonia of left lower lobe due to infectious organism, Nausea and vomiting, unspecified vomiting type, Esophageal stricture, and Esophageal dysphagia were also pertinent to this visit.    Code status:   Current Code Status       Date Active Code Status Order ID Comments User Context       1/25/2024 0536 CPR (Attempt to Resuscitate) [id number]  Kathryn Abraham APRN ED        Question Answer    Code Status (Patient has no pulse and is not breathing) CPR (Attempt to Resuscitate)    Medical Interventions (Patient has pulse or is breathing) Full Support                    Allergies:   Patient has no known allergies.    Isolation:  No active isolations     Fall Risk:  Fall Risk Assessment was completed, and patient is at moderate risk for falls.   Predictive Model Details         35 (Low) Factor Value    Calculated 1/25/2024 10:39 Age 73    Risk of Fall Model Musculoskeletal Assessment WDL     Active Peripheral IV Present     Imaging order in this encounter Present     Number of Distinct Medication Classes administered 7     Respiratory Rate 18     Skin Assessment WDL     Financial Class Other     Diastolic BP 56     Hemoglobin A1c 11.4 %     Magnesium not on file     Drug Use No     Calcium 8.6 mg/dL     Chloride 94 mmol/L     Freddy Scale not on file     Total Bilirubin 1.1 mg/dL     Peripheral Vascular Assessment WDL     Clinically Relevant Sex Not Female     Number of administrations of Ulcer Drugs 1     Number of administrations of Analgesic Narcotics 1     Albumin 4 g/dL     Cardiac Assessment WDL     Days after Admission 0.364     Gastrointestinal Assessment X     Potassium 4.5 mmol/L     Creatinine 1.12 mg/dL     ALT 21 U/L         Weight:  "      01/25/24  0151   Weight: 111 kg (245 lb)       Intake and Output  No intake or output data in the 24 hours ending 01/25/24 1039    Diet:   Dietary Orders (From admission, onward)       Start     Ordered    01/25/24 0853  NPO Diet NPO Type: Sips with Meds  Diet Effective Now        Question:  NPO Type  Answer:  Sips with Meds    01/25/24 0852    01/25/24 0536  Patient is on Glucommander  (Glucommander™ SQ Insulin - Select Dosing Option)  Continuous        Question Answer Comment   Tray Note: Glucommander    Patient is on Glucommander: Yes        01/25/24 0535                     Most recent vitals:   Vitals:    01/25/24 0151 01/25/24 0247 01/25/24 0702   BP: 115/88 158/81 97/56   Pulse: 102 90 87   Resp: 18     Temp: 98.1 °F (36.7 °C)     TempSrc: Oral     SpO2: 96% 92% 94%   Weight: 111 kg (245 lb)     Height: 175.3 cm (69\")         Active LDAs/IV Access:   Lines, Drains & Airways       Active LDAs       Name Placement date Placement time Site Days    Peripheral IV 01/25/24 0228 Anterior;Left;Proximal Forearm 01/25/24 0228  Forearm  less than 1                    Skin Condition:   Skin Assessments (last day)       None             Labs (abnormal labs have a star):   Labs Reviewed   COMPREHENSIVE METABOLIC PANEL - Abnormal; Notable for the following components:       Result Value    Glucose 423 (*)     Sodium 131 (*)     Chloride 94 (*)     All other components within normal limits    Narrative:     GFR Normal >60  Chronic Kidney Disease <60  Kidney Failure <15    The GFR formula is only valid for adults with stable renal function between ages 18 and 70.   CBC WITH AUTO DIFFERENTIAL - Abnormal; Notable for the following components:    WBC 12.10 (*)     Neutrophil % 95.2 (*)     Lymphocyte % 1.9 (*)     Monocyte % 2.4 (*)     Eosinophil % 0.0 (*)     Neutrophils, Absolute 11.50 (*)     Lymphocytes, Absolute 0.20 (*)     All other components within normal limits   HEMOGLOBIN A1C - Abnormal; Notable for the " following components:    Hemoglobin A1C 11.40 (*)     All other components within normal limits   TSH - Abnormal; Notable for the following components:    TSH 8.970 (*)     All other components within normal limits   POCT GLUCOSE FINGERSTICK - Abnormal; Notable for the following components:    Glucose 372 (*)     All other components within normal limits   POCT GLUCOSE FINGERSTICK - Abnormal; Notable for the following components:    Glucose 311 (*)     All other components within normal limits   COVID-19 AND FLU A/B, NP SWAB IN TRANSPORT MEDIA 1 HR TAT - Normal    Narrative:     Fact sheet for providers: https://www.fda.gov/media/078632/download    Fact sheet for patients: https://www.fda.gov/media/160138/download    Test performed by PCR.   LIPASE - Normal   SINGLE HSTROPONIN T - Normal    Narrative:     High Sensitive Troponin T Reference Range:  <14.0 ng/L- Negative Female for AMI  <22.0 ng/L- Negative Male for AMI  >=14 - Abnormal Female indicating possible myocardial injury.  >=22 - Abnormal Male indicating possible myocardial injury.   Clinicians would have to utilize clinical acumen, EKG, Troponin, and serial changes to determine if it is an Acute Myocardial Infarction or myocardial injury due to an underlying chronic condition.        POC LACTATE - Normal   BLOOD CULTURE   BLOOD CULTURE   LEGIONELLA ANTIGEN, URINE   STREP PNEUMO AG, URINE OR CSF   POC LACTATE   POCT GLUCOSE FINGERSTICK   POCT GLUCOSE FINGERSTICK   POCT GLUCOSE FINGERSTICK   POCT GLUCOSE FINGERSTICK   POCT GLUCOSE FINGERSTICK   CBC AND DIFFERENTIAL    Narrative:     The following orders were created for panel order CBC & Differential.  Procedure                               Abnormality         Status                     ---------                               -----------         ------                     CBC Auto Differential[[id number]]        Abnormal            Final result                 Please view results for these tests on the  individual orders.       LOC: Person, Place, Time, and Situation    Telemetry:  Med/Surg    Cardiac Monitoring Ordered: yes    EKG:   ECG 12 Lead Chest Pain   Final Result   HEART RATE= 89  bpm   RR Interval= 672  ms   CT Interval= 170  ms   P Horizontal Axis= 22  deg   P Front Axis= 18  deg   QRSD Interval= 87  ms   QT Interval= 448  ms   QTcB= 547  ms   QRS Axis= -3  deg   T Wave Axis= 31  deg   - ABNORMAL ECG -   Sinus rhythm   Prolonged QT interval   When compared with ECG of 11-Apr-2023 5:38:22,   Significant rate increase   Significant repolarization change   Electronically Signed By: Eric Anand ([email or web address]) 25-Jan-2024 06:08:39   Date and Time of Study: 2024-01-25 03:40:24          Medications Given in the ED:   Medications   lactated ringers infusion (125 mL/hr Intravenous Currently Infusing 1/25/24 0811)   sodium chloride 0.9 % flush 10 mL (has no administration in time range)   azithromycin (ZITHROMAX) 500 mg in sodium chloride 0.9 % 250 mL IVPB-VTB (has no administration in time range)   cefTRIAXone (ROCEPHIN) 2,000 mg in sodium chloride 0.9 % 100 mL IVPB (has no administration in time range)   azithromycin (ZITHROMAX) 500 mg in sodium chloride 0.9 % 250 mL IVPB-VTB (has no administration in time range)   ipratropium-albuterol (DUO-NEB) nebulizer solution 3 mL (has no administration in time range)   insulin glargine (LANTUS, SEMGLEE) injection 1-200 Units (has no administration in time range)   insulin lispro (HUMALOG/ADMELOG) injection 1-200 Units (has no administration in time range)   insulin lispro (HUMALOG/ADMELOG) injection 1-200 Units (has no administration in time range)   dextrose (GLUTOSE) oral gel 15 g (has no administration in time range)   dextrose (D50W) (25 g/50 mL) IV injection 10-50 mL (has no administration in time range)   Glucagon (GLUCAGEN) injection 1 mg (has no administration in time range)   sodium chloride 0.9 % flush 10 mL (10 mL Intravenous Not Given 1/25/24 1028)   sodium chloride  0.9 % flush 10 mL (has no administration in time range)   sodium chloride 0.9 % infusion 40 mL (has no administration in time range)   sennosides-docusate (PERICOLACE) 8.6-50 MG per tablet 2 tablet (has no administration in time range)     And   polyethylene glycol (MIRALAX) packet 17 g (has no administration in time range)     And   bisacodyl (DULCOLAX) EC tablet 5 mg (has no administration in time range)     And   bisacodyl (DULCOLAX) suppository 10 mg (has no administration in time range)   acetaminophen (TYLENOL) tablet 650 mg (has no administration in time range)   ondansetron (ZOFRAN) injection 4 mg (has no administration in time range)   ondansetron (ZOFRAN) injection 4 mg (4 mg Intravenous Given 1/25/24 0238)   famotidine (PEPCID) injection 20 mg (20 mg Intravenous Given 1/25/24 0238)   HYDROmorphone (DILAUDID) injection 0.5 mg (0.5 mg Intravenous Given 1/25/24 0344)   insulin lispro (HUMALOG/ADMELOG) injection 8 Units (8 Units Subcutaneous Given 1/25/24 0351)   iopamidol (ISOVUE-370) 76 % injection 100 mL (100 mL Intravenous Given 1/25/24 0431)   cefTRIAXone (ROCEPHIN) 2,000 mg in sodium chloride 0.9 % 100 mL IVPB (0 mg Intravenous Stopped 1/25/24 0812)       Imaging results:  CT Abdomen Pelvis With Contrast    Result Date: 1/25/2024  Impression: 1.Left lower lobe pneumonia. 2.No acute abdominal or pelvic abnormality. 3.Mild colonic fecal retention. 4.Small stomach and fat-containing hiatal hernia. 5.Previously seen 5 mm right lower lobe nodule is semisolid and barely perceptible on today's exam. Electronically Signed: Yfn Crow MD  1/25/2024 4:55 AM EST  Workstation ID: [id number]     Social issues:   Social History     Socioeconomic History    Marital status:    Tobacco Use    Smoking status: Never    Smokeless tobacco: Never   Vaping Use    Vaping Use: Never used   Substance and Sexual Activity    Alcohol use: Not Currently    Drug use: Never     Comment: suboxone    Sexual activity: Defer        NIH Stroke Scale:  Interval: (not recorded)  1a. Level of Consciousness: (not recorded)  1b. LOC Questions: (not recorded)  1c. LOC Commands: (not recorded)  2. Best Gaze: (not recorded)  3. Visual: (not recorded)  4. Facial Palsy: (not recorded)  5a. Motor Arm, Left: (not recorded)  5b. Motor Arm, Right: (not recorded)  6a. Motor Leg, Left: (not recorded)  6b. Motor Leg, Right: (not recorded)  7. Limb Ataxia: (not recorded)  8. Sensory: (not recorded)  9. Best Language: (not recorded)  10. Dysarthria: (not recorded)  11. Extinction and Inattention (formerly Neglect): (not recorded)    Total (NIH Stroke Scale): (not recorded)     Additional notable assessment information:     Nursing report ED to floor:      Lori Pérez RN   01/25/24 10:39 EST 2

## 2024-01-25 NOTE — ANESTHESIA PREPROCEDURE EVALUATION
Anesthesia Evaluation     Patient summary reviewed and Nursing notes reviewed   no history of anesthetic complications:   NPO Solid Status: > 8 hours  NPO Liquid Status: > 8 hours           Airway   Mallampati: II  TM distance: >3 FB  Neck ROM: limited  Dental    (+) edentulous    Pulmonary - normal exam   (+) pneumonia improving ,  (-) not a smoker  Cardiovascular - normal exam    ECG reviewed    (+) hypertension      Neuro/Psych  (+) psychiatric history Anxiety  GI/Hepatic/Renal/Endo    (+) obesity, GERD, diabetes mellitus type 2 poorly controlled, thyroid problem     Musculoskeletal (-) negative ROS    Abdominal    Substance History - negative use     OB/GYN          Other - negative ROS       ROS/Med Hx Other: ASSESSMENT:  Solid food dysphagia  Nausea with intermittent vomiting and upper abdominal discomfort  Left lower lobe pneumonia  Hyperglycemia/Poorly controlled diabetes -A1c 11.4%  History of CAD -Plavix and aspirin  Suboxone use     PLAN:  Patient is a 73-year-old male with poorly controlled diabetes and CAD on aspirin and Plavix.  He presents with feeling poorly over the last several days with generalized body pain, nausea and intermittent vomiting.  He was found to have pneumonia with hyperglycemia and A1c 11.4%.  Apparently he was not taking his diabetes medications at home.  Also on Suboxone.     Glucose 423 with A1c 11.4%.  Diabetes management per primary or endocrinology.  LFTs normal, lipase normal and CT with no acute abdominal abnormality but mild colonic fecal retention and small stomach with fat-containing hiatal hernia.  We will proceed with EGD and likely dilation with history of esophageal stricture.  Continue PPI.  Strict diabetes control recommended as possible underlying gastroparesis in the differential.  Avoid NSAID use.  Pneumonia on imaging, currently on ceftriaxone and azithromycin.  Further recommendations to follow EGD findings.  Would recommend outpatient screening colonoscopy.      I discussed the patients findings and my recommendations with the patient.     We appreciate the referral         [facility]  APPENDECTOMY ENDOSCOPY  ENDOSCOPY                 Anesthesia Plan    ASA 3     MAC   total IV anesthesia  (Patient identified; pre-operative vital signs, all relevant labs/studies, complete medical/surgical/anesthetic history, full medication list, full allergy list, and NPO status obtained/reviewed; physical assessment performed; anesthetic options, side effects, potential complications, risks, and benefits discussed; questions answered; written anesthesia consent obtained; patient cleared for procedure; anesthesia machine and equipment checked and functioning)    Anesthetic plan, risks, benefits, and alternatives have been provided, discussed and informed consent has been obtained with: patient.    Plan discussed with CRNA.      CODE STATUS:    Code Status (Patient has no pulse and is not breathing): CPR (Attempt to Resuscitate)  Medical Interventions (Patient has pulse or is breathing): Full Support

## 2024-01-25 NOTE — CASE MANAGEMENT/SOCIAL WORK
Discharge Planning Assessment   Matt     Patient Name: Manfred Perry  MRN: [id number]  Today's Date: 1/25/2024    Admit Date: 1/25/2024    Plan: Home   Discharge Needs Assessment       Row Name 01/25/24 1053       Living Environment    People in Home alone    Current Living Arrangements home    Potentially Unsafe Housing Conditions none    Primary Care Provided by self    Provides Primary Care For no one    Family Caregiver if Needed sibling(s)  Sheree-sister    Quality of Family Relationships helpful    Able to Return to Prior Arrangements yes       Resource/Environmental Concerns    Resource/Environmental Concerns none    Transportation Concerns none       Transition Planning    Patient/Family Anticipates Transition to home    Transportation Anticipated family or friend will provide  Sister-Sheree       Discharge Needs Assessment    Readmission Within the Last 30 Days no previous admission in last 30 days    Equipment Currently Used at Home cane, straight    Concerns to be Addressed discharge planning                   Discharge Plan       Row Name 01/25/24 1054       Plan    Plan Home    Plan Comments Spoke with patient at bedside,lives alone, states IADL's, Confirmed PCP and Pharmacy. Denies transportation or medication coverage issues. DC Barrier: Watch O2 needs, IVAB's.               Expected Discharge Date and Time       Expected Discharge Date Expected Discharge Time    Jan 26, 2024            Demographic Summary       Row Name 01/25/24 1052       General Information    Admission Type observation    Arrived From home    Required Notices Provided Observation Status Notice    Referral Source admission list;patient    Reason for Consult discharge planning    Preferred Language English                   Functional Status       Row Name 01/25/24 1052       Functional Status    Usual Activity Tolerance moderate    Current Activity Tolerance moderate       Functional Status, IADL    Medications independent     Housekeeping independent    Laundry independent    Shopping independent       Mental Status    General Appearance WDL WDL                  Patient Forms       Row Name 01/25/24 1051       Patient Forms    Patient Observation Letter Delivered  1/25/24 Registration           Met with patient in room wearing PPE: mask, face shield/goggles, gloves, gown.      Maintained distance greater than six feet and spent less than 15 minutes in the room.     Rachana Trivedi RN

## 2024-01-25 NOTE — ED NOTES
When nurse entered room patient had removed all cords to the monitor. He was placed back on the monitor and found to have low oxygen sat. 2L NC placed on patient. Nurse remained at bedside until patient return to normal limits. Patient requesting ETA when he will be moved to a room. Patient informed that he will be moved as soon as possible but at this time nurse did not have clear ETA

## 2024-01-25 NOTE — ED PROVIDER NOTES
Subjective   History of Present Illness  73-year-old male presents with complaints of feeling sick.  States it started at 3:00.  He had some nausea.  He complains of upper abdominal pain.  He states he vomited 3-4 times which was nonbloody.  He states he has been having some swallowing trouble and spitting up food for the last month.  He reports he saw his PMD on Wednesday was found to have elevated blood sugar started on medication for diabetes but did not tell them about his swallowing trouble.  He reports he has had esophageal dilation in the past.  He reports no cough or shortness of breath.  No diarrhea.  He reports no fever.    Review of Systems    Past Medical History:   Diagnosis Date    Disease of thyroid gland     Dysphagia 01/2023    GERD (gastroesophageal reflux disease)     Hypertension     Hypokalemia 4/9/2023    Hypomagnesemia 4/9/2023       No Known Allergies    Past Surgical History:   Procedure Laterality Date    APPENDECTOMY      CARDIAC CATHETERIZATION N/A 4/10/2023    Procedure: Left Heart Cath and coronary angiogram;  Surgeon: Matias Loyola MD;  Location: [facility] CATH INVASIVE LOCATION;  Service: Cardiovascular;  Laterality: N/A;    CARDIAC CATHETERIZATION N/A 4/10/2023    Procedure: Percutaneous Coronary Intervention;  Surgeon: Matias Loyola MD;  Location: [facility] CATH INVASIVE LOCATION;  Service: Cardiovascular;  Laterality: N/A;    ENDOSCOPY N/A 8/10/2022    Procedure: EGD WITH DILATION with 42 bougie and 12-15mm balloon to 15mm;  Surgeon: RAJAN Moctezuma MD;  Location: [facility] ENDOSCOPY;  Service: Gastroenterology;  Laterality: N/A;  esophagitis and hiatal hernia    ENDOSCOPY N/A 11/15/2022    Procedure: ESOPHAGOGASTRODUODENOSCOPY WITH DILATION ( BOUGIE 46, );  Surgeon: RAJAN Moctezuma MD;  Location: [facility] ENDOSCOPY;  Service: Gastroenterology;  Laterality: N/A;  HIATIAL HERNIA  GASTRITIS  ESOPHOGEAL STRICTURE    ENDOSCOPY N/A 1/19/2023    Procedure: ESOPHAGOGASTRODUODENOSCOPY with  esophageal dilation (bougie 50, 52) and biopsy x 3 areas;  Surgeon: RAJAN Moctezuma MD;  Location: [facility] ENDOSCOPY;  Service: Gastroenterology;  Laterality: N/A;  esophagitis, hiatal hernia, esophageal stricture         No family history on file.    Social History     Socioeconomic History    Marital status:    Tobacco Use    Smoking status: Never    Smokeless tobacco: Never   Vaping Use    Vaping Use: Never used   Substance and Sexual Activity    Alcohol use: Not Currently    Drug use: Never     Comment: suboxone    Sexual activity: Defer     Prior to Admission medications    Medication Sig Start Date End Date Taking? Authorizing Provider   aspirin 81 MG chewable tablet Chew 1 tablet Daily. 4/12/23   Omi Lane MD   atorvastatin (LIPITOR) 40 MG tablet Take 1 tablet by mouth Every Night. 4/11/23   Omi Lane MD   buprenorphine-naloxone (SUBOXONE) 8-2 MG per SL tablet Place 1 tablet under the tongue 2 (Two) Times a Day.    ProviderAdri MD   clopidogrel (PLAVIX) 75 MG tablet Take 1 tablet by mouth Daily. 12/20/23   Matias Loyola MD   hydroCHLOROthiazide (HYDRODIURIL) 25 MG tablet Take 1 tablet by mouth Daily.    ProviderAdri MD   hydrOXYzine (ATARAX) 25 MG tablet Take 1 tablet by mouth Every 6 (Six) Hours As Needed for Anxiety. 12/11/20   Nola Knight PA   levothyroxine (Synthroid) 50 MCG tablet Take 1 tablet by mouth Daily. 4/11/23   Omi Lane MD   losartan (COZAAR) 100 MG tablet Take 1 tablet by mouth Daily.    ProviderAdri MD   magnesium oxide (MAG-OX) 400 MG tablet Take 1 tablet by mouth Daily. 4/11/23   Omi Lane MD   metoprolol tartrate (LOPRESSOR) 25 MG tablet Take 1 tablet by mouth Every 12 (Twelve) Hours. 12/20/23   Matias Loyola MD   nitroglycerin (NITROSTAT) 0.4 MG SL tablet Place 1 tablet under the tongue Every 5 (Five) Minutes As Needed for Chest Pain (Only if SBP Greater Than 100). Take no more than 3 doses in 15  minutes. 12/20/23   Matias Loyola MD   ondansetron ODT (ZOFRAN-ODT) 4 MG disintegrating tablet Place 1 tablet on the tongue Every 8 (Eight) Hours As Needed for Nausea or Vomiting. 12/11/20   Nola Knight PA   pantoprazole (Protonix) 40 MG EC tablet Take 1 tablet by mouth Daily. 4/11/23   Omi Lane MD           Objective   Physical Exam  73-year-old male awake alert.  Generally overweight.  Pupils equal round react light.  Oropharynx mucous membranes moist neck supple chest clear cardiovascular rhythm rate and rhythm abdomen is soft without localizing tenderness mass rebound or guarding.  Extremities 1+ symmetric edema.  He states this is normal for him.  Procedures           ED Course      Results for orders placed or performed during the hospital encounter of 01/25/24   COVID-19 and FLU A/B PCR, 1 HR TAT - Swab, Nasopharynx    Specimen: Nasopharynx; Swab   Result Value Ref Range    COVID19 Not Detected Not Detected - Ref. Range    Influenza A PCR Not Detected Not Detected    Influenza B PCR Not Detected Not Detected   Comprehensive Metabolic Panel    Specimen: Arm, Right; Blood   Result Value Ref Range    Glucose 423 (C) 65 - 99 mg/dL    BUN 23 8 - 23 mg/dL    Creatinine 1.12 0.76 - 1.27 mg/dL    Sodium 131 (L) 136 - 145 mmol/L    Potassium 4.5 3.5 - 5.2 mmol/L    Chloride 94 (L) 98 - 107 mmol/L    CO2 25.0 22.0 - 29.0 mmol/L    Calcium 8.6 8.6 - 10.5 mg/dL    Total Protein 7.8 6.0 - 8.5 g/dL    Albumin 4.0 3.5 - 5.2 g/dL    ALT (SGPT) 21 1 - 41 U/L    AST (SGOT) 35 1 - 40 U/L    Alkaline Phosphatase 115 39 - 117 U/L    Total Bilirubin 1.1 0.0 - 1.2 mg/dL    Globulin 3.8 gm/dL    A/G Ratio 1.1 g/dL    BUN/Creatinine Ratio 20.5 7.0 - 25.0    Anion Gap 12.0 5.0 - 15.0 mmol/L    eGFR 68.9 >60.0 mL/min/1.73   Lipase    Specimen: Arm, Right; Blood   Result Value Ref Range    Lipase 24 13 - 60 U/L   CBC Auto Differential    Specimen: Blood   Result Value Ref Range    WBC 12.10 (H) 3.40 - 10.80  10*3/mm3    RBC 4.46 4.14 - 5.80 10*6/mm3    Hemoglobin 13.3 13.0 - 17.7 g/dL    Hematocrit 39.7 37.5 - 51.0 %    MCV 88.9 79.0 - 97.0 fL    MCH 29.8 26.6 - 33.0 pg    MCHC 33.5 31.5 - 35.7 g/dL    RDW 13.8 12.3 - 15.4 %    RDW-SD 45.1 37.0 - 54.0 fl    MPV 9.3 6.0 - 12.0 fL    Platelets 228 140 - 450 10*3/mm3    Neutrophil % 95.2 (H) 42.7 - 76.0 %    Lymphocyte % 1.9 (L) 19.6 - 45.3 %    Monocyte % 2.4 (L) 5.0 - 12.0 %    Eosinophil % 0.0 (L) 0.3 - 6.2 %    Basophil % 0.5 0.0 - 1.5 %    Neutrophils, Absolute 11.50 (H) 1.70 - 7.00 10*3/mm3    Lymphocytes, Absolute 0.20 (L) 0.70 - 3.10 10*3/mm3    Monocytes, Absolute 0.30 0.10 - 0.90 10*3/mm3    Eosinophils, Absolute 0.00 0.00 - 0.40 10*3/mm3    Basophils, Absolute 0.10 0.00 - 0.20 10*3/mm3    nRBC 0.0 0.0 - 0.2 /100 WBC   Single High Sensitivity Troponin T    Specimen: Arm, Right; Blood   Result Value Ref Range    HS Troponin T 9 <22 ng/L   POC Glucose Once    Specimen: Blood   Result Value Ref Range    Glucose 372 (H) 70 - 105 mg/dL   ECG 12 Lead Chest Pain   Result Value Ref Range    QT Interval 448 ms    QTC Interval 547 ms     CT Abdomen Pelvis With Contrast    Result Date: 1/25/2024  Impression: 1.Left lower lobe pneumonia. 2.No acute abdominal or pelvic abnormality. 3.Mild colonic fecal retention. 4.Small stomach and fat-containing hiatal hernia. 5.Previously seen 5 mm right lower lobe nodule is semisolid and barely perceptible on today's exam. Electronically Signed: Yfn Crow MD  1/25/2024 4:55 AM EST  Workstation ID: [id number]   Medications   lactated ringers infusion (125 mL/hr Intravenous New Bag 1/25/24 0237)   sodium chloride 0.9 % flush 10 mL (has no administration in time range)   cefTRIAXone (ROCEPHIN) 2,000 mg in sodium chloride 0.9 % 100 mL IVPB (has no administration in time range)   azithromycin (ZITHROMAX) 500 mg in sodium chloride 0.9 % 250 mL IVPB-VTB (has no administration in time range)   cefTRIAXone (ROCEPHIN) 2,000 mg in sodium chloride  "0.9 % 100 mL IVPB (has no administration in time range)   azithromycin (ZITHROMAX) 500 mg in sodium chloride 0.9 % 250 mL IVPB-VTB (has no administration in time range)   ipratropium-albuterol (DUO-NEB) nebulizer solution 3 mL (has no administration in time range)   ondansetron (ZOFRAN) injection 4 mg (4 mg Intravenous Given 1/25/24 0238)   famotidine (PEPCID) injection 20 mg (20 mg Intravenous Given 1/25/24 0238)   HYDROmorphone (DILAUDID) injection 0.5 mg (0.5 mg Intravenous Given 1/25/24 0344)   insulin lispro (HUMALOG/ADMELOG) injection 8 Units (8 Units Subcutaneous Given 1/25/24 0351)   iopamidol (ISOVUE-370) 76 % injection 100 mL (100 mL Intravenous Given 1/25/24 0431)     /81   Pulse 90   Temp 98.1 °F (36.7 °C) (Oral)   Resp 18   Ht 175.3 cm (69\")   Wt 111 kg (245 lb)   SpO2 92%   BMI 36.18 kg/m²                                            Medical Decision Making  Amount and/or Complexity of Data Reviewed  Labs: ordered.  Radiology: ordered.  ECG/medicine tests: ordered.    Risk  Prescription drug management.    Chart review: Patient cardiac catheterization April of last year that showed left circumflex with 99% proximal and 80% mid blockage this was treated with stent placement.  He had EGD for esophagitis hiatal hernia and esophageal stricture January of last year  Comorbidity: As per past history   Differential: Hiatal hernia, recurrent esophageal stricture,  My EKG interpretation: Sinus rhythm rate of 89 prolonged QTc compared to previous rate increased  prolonged QTc otherwise no significant change  Lab: White count 12.1 with hemoglobin 13.3 platelet count 228 with 95 segs no bands, comprehensive metabolic panel glucose 423 with BUN 23 creatinine 1.12 sodium 131 lipase normal at 24 COVID influenza not detected  My Radiology review and interpretation: CT abdomen pelvis reveals left lower lobe infiltrate consistent with pneumonia no acute intra-abdominal or pelvic abnormality other than some " mild increased 2.  There is a small stomach and fat-containing hiatal hernia.  There is fluid noted in distal esophagus. there is an exophytic hemorrhagic or proteinaceous cyst at the superior right kidney measuring 18 mm.  Discussion/treatment: Repeat evaluation patient was complaining of more crampy upper abdominal pain.  Review of his chart shows he had CT scan 2020 that showed 1 cm lesion upper pole right kidney recommended MRI of abdomen.  I do not see any follow-up imaging.  Will check CT abdomen pelvis with IV contrast.  He was given dose of Dilaudid for pain.  He also is complaining of aches in his arms and legs we will check flu and COVID.  Patient's sodium with corrected to 136-139 depending on formula used based on glucose of 423.  Patient was given subcu insulin for his hyperglycemia.  Cultures were obtained and was given Rocephin and Zithromax for his pneumonia.  Patient's findings were discussed with him.  His O2 saturation was running in the upper 80s when sleeping but did improve into the 90s when awakened.  Patient was discussed with the nurse practitioner for the hospitalist.  Will bring him in for continued treatment.  Patient was evaluated using appropriate PPE        Final diagnoses:   Upper abdominal pain   Pneumonia of left lower lobe due to infectious organism   Nausea and vomiting, unspecified vomiting type   Esophageal stricture       ED Disposition  ED Disposition       ED Disposition   Decision to Admit    Condition   --    Comment   Level of Care: Med/Surg [1]   Admitting Physician: JUANA HERNANDEZ [[id number]]                 No follow-up provider specified.       Medication List      No changes were made to your prescriptions during this visit.            Eric Anand MD  01/25/24 3355

## 2024-01-25 NOTE — ANESTHESIA POSTPROCEDURE EVALUATION
Patient: Manfred Perry    Procedure Summary       Date: 01/25/24 Room / Location: [facility] ENDOSCOPY 4 / [facility] ENDOSCOPY    Anesthesia Start: 1332 Anesthesia Stop: 1357    Procedure: ESOPHAGOGASTRODUODENOSCOPY, non-guided balloon dilation of distal esophagus (12-14mm) Diagnosis:       Esophageal dysphagia      (Esophageal dysphagia [R13.19])    Surgeons: Rachel Easley MD Provider:     Anesthesia Type: MAC ASA Status: 3            Anesthesia Type: MAC    Vitals  Vitals Value Taken Time   /64 01/25/24 1410   Temp     Pulse 92 01/25/24 1410   Resp 15 01/25/24 1356   SpO2 95 % 01/25/24 1410   Vitals shown include unfiled device data.        Post Anesthesia Care and Evaluation    Patient location during evaluation: PACU  Patient participation: complete - patient participated  Level of consciousness: awake and alert  Pain management: satisfactory to patient    Airway patency: patent  Anesthetic complications: No anesthetic complications  PONV Status: none  Cardiovascular status: acceptable  Respiratory status: acceptable  Hydration status: acceptable

## 2024-01-25 NOTE — CONSULTS
Diabetes Education    Patient Name:  Manfred Perry  YOB: 1950  MRN: [id number]  Admit Date:  1/25/2024    Consult received due to bs >200. Pt off floor in endoscopy. Will attempt follow up at later time.       Electronically signed by:  Maira Murphy RN  01/25/24 12:12 EST

## 2024-01-25 NOTE — PLAN OF CARE
Problem: Adult Inpatient Plan of Care  Goal: Plan of Care Review  Outcome: Ongoing, Progressing  Flowsheets (Taken 1/25/2024 1719)  Plan of Care Reviewed With: patient  Outcome Evaluation: patient stable on 2 liters oxygen. being treated for pneumonia, had dialtion of esophagus with EGD. patient from home, will need PT eval for weakness. no complaints of pain nor concerns. will continue to monitor.  Goal: Patient-Specific Goal (Individualized)  Outcome: Ongoing, Progressing  Goal: Absence of Hospital-Acquired Illness or Injury  Outcome: Ongoing, Progressing  Intervention: Identify and Manage Fall Risk  Description: Perform standard risk assessment on admission using a validated tool or comprehensive approach appropriate to the patient; reassess fall risk frequently, with change in status or transfer to another level of care.  Communicate fall injury risk to interprofessional healthcare team.  Determine need for increased observation, equipment and environmental modification, such as low bed, signage and supportive, nonskid footwear.  Adjust safety measures to individual developmental age, stage and identified risk factors.  Reinforce the importance of safety and physical activity with patient and family.  Perform regular intentional rounding to assess need for position change, pain assessment and personal needs, including assistance with toileting.  Recent Flowsheet Documentation  Taken 1/25/2024 1500 by Aneta Reyes, RN  Safety Promotion/Fall Prevention:   activity supervised   safety round/check completed  Intervention: Prevent Skin Injury  Description: Perform a screening for skin injury risk, such as pressure or moisture associated skin damage on admission and at regular intervals throughout hospital stay.  Keep all areas of skin (especially folds) clean and dry.  Maintain adequate skin hydration.  Relieve and redistribute pressure and protect bony prominences; implement measures based on patient-specific  risk factors.  Match turning and repositioning schedule to clinical condition.  Encourage weight shift frequently; assist with reposition if unable to complete independently.  Float heels off bed; avoid pressure on the Achilles tendon.  Keep skin free from extended contact with medical devices.  Encourage functional activity and mobility, as early as tolerated.  Use aids (e.g., slide boards, mechanical lift) during transfer.  Recent Flowsheet Documentation  Taken 1/25/2024 1500 by Aneta Reyes RN  Body Position: position changed independently  Skin Protection:   adhesive use limited   tubing/devices free from skin contact  Intervention: Prevent and Manage VTE (Venous Thromboembolism) Risk  Description: Assess for VTE (venous thromboembolism) risk.  Encourage and assist with early ambulation.  Initiate and maintain compression or other therapy, as indicated, based on identified risk in accordance with organizational protocol and provider order.  Encourage both active and passive leg exercises while in bed, if unable to ambulate.  Recent Flowsheet Documentation  Taken 1/25/2024 1500 by Aneta Reyes RN  Activity Management:   sitting, edge of bed   activity encouraged  VTE Prevention/Management:   bilateral   sequential compression devices off  Goal: Optimal Comfort and Wellbeing  Outcome: Ongoing, Progressing  Intervention: Provide Person-Centered Care  Description: Use a family-focused approach to care.  Develop trust and rapport by proactively providing information, encouraging questions, addressing concerns and offering reassurance.  Acknowledge emotional response to hospitalization.  Recognize and utilize personal coping strategies.  Honor spiritual and cultural preferences.  Recent Flowsheet Documentation  Taken 1/25/2024 1500 by Aneta Reyes RN  Trust Relationship/Rapport:   care explained   choices provided   reassurance provided   thoughts/feelings acknowledged  Goal: Readiness for Transition of  Care  Outcome: Ongoing, Progressing   Goal Outcome Evaluation:  Plan of Care Reviewed With: patient           Outcome Evaluation: patient stable on 2 liters oxygen. being treated for pneumonia, had dialtion of esophagus with EGD. patient from home, will need PT eval for weakness. no complaints of pain nor concerns. will continue to monitor.

## 2024-01-25 NOTE — PLAN OF CARE
Goal Outcome Evaluation:  Plan of Care Reviewed With: patient        Progress: no change  Outcome Evaluation: Pt moved to ambulatory 05. Admisison completed

## 2024-01-25 NOTE — CONSULTS
"Diabetes Education  Assessment/Teaching    Patient Name:  Manfred Perry  YOB: 1950  MRN: [id number]  Admit Date:  1/25/2024      Assessment Date:  1/25/2024  Flowsheet Row Most Recent Value   General Information     Referral From: MD order  [Consult received due to bs >200. Adm bs 372. A1c this adm 11.4%.]   Height 175.3 cm (69\")   Height Method Stated   Weight 111 kg (245 lb)   Weight Method Stated   Pregnancy Assessment    Diabetes History    What type of diabetes do you have? Type 2   Length of Diabetes Diagnosis --  [Pt states he did not know he had diabetes. Newly dx.]   Do you test your blood sugar at home? no   Education Preferences    Nutrition Information    Assessment Topics    DM Goals             Flowsheet Row Most Recent Value   DM Education Needs    Meter Needs meter  [Pt states has never checked his bs. Instructed pt in use of [employer] Guide Me. Pt able to take test strip out of container and insert into meter but needed some assistance. Pt also needed assistance with inserting lancet into lancing device.]   Meter Type Accuchek   Blood Glucose Target Range Reviewed A1c result of 11.4%. Reviewed previous A1c result from 4/9/2023 of 7.4%. Discussed healthy bs range and healthy A1c target. Discussed importance of bs control.   Medication Oral  [Pt told educator he was started on insulin shots a couple of days ago. Further into the assessment, pt states he does not have insulin at home. He states was started on pills a couple days ago. Per home me list, pt has Metformin 500 mg bid.]   Reducing Risks A1C testing  [Gave A1c info sheet.]   Motivation Not interested   Teaching Method Explanation, Discussion, Demonstration, Handouts   Patient Response Needs reinforcement  [Pt closing eyes during discussion. He states he is very sleepy.]              Other Comments:  Met with pt at bedside. Discussed with pt that the A1c from 4/9/2023 was falling within the diabetes range. Pt states he new it was " on the border of having diabetes. Reviewed that diabetes is diagnosed when A1c 6.5% or greater.     Instructed pt in use of insulin pen. Pt needing assistance with performing procedure. Discussed when to take long-acting and mealtime insulin. Discussed injection sites, rotation of sites, storage of insulin and disposal of pen needles. Pt continues falling asleep during education session. Asked pt if he would be able to stay awake for more education. Pt requesting educator return on different day.     Handouts left at bedside: First Steps booklet, Planning Healthy Meals packet, Low bs handout, log book, Insulin Pen Instruction sheet and Needle Disposal sheet.    Educator will attempt follow up on different day.       Electronically signed by:  Maira Murphy RN  01/25/24 18:58 EST

## 2024-01-25 NOTE — CONSULTS
"GI CONSULT  NOTE:    Referring Provider:  Dr. Massey    Chief complaint: Dysphagia    Subjective . \"I was hurting all over and nauseated\"    History of present illness: Manfred Perry is a 73 y.o. male who has a history of poorly controlled diabetes, CAD/stent on aspirin and Plavix as well as Suboxone use.  He presents with feeling poorly over the last several days with nausea and some vomiting, pain all over and difficulty swallowing.  He was found to have pneumonia and poorly controlled diabetes.  He was admitted for further evaluation.  He does report dysphagia with history of similar complaints and known stricture.  Relief with previous dilations.  No hematemesis.  No significant weight loss but decreased appetite.  No constipation or diarrhea.  He does take Aleve as well as aspirin and Plavix.  No abdominal pain.  He does report some nonhealing lesions on his abdomen from possibly previous biopsies.    Endo History:  1/2023 EGD by Dr. Moctezuma -medium hiatal hernia, stricture in the lower esophagus s/p dilation to 52 Japanese, abnormal esophageal mucosa with biopsy showing severe reflux, esophagitis, gastritis with biopsies H. pylori negative, duodenal biopsies benign    Past Medical History:  Past Medical History:   Diagnosis Date    Disease of thyroid gland     Dysphagia 01/2023    GERD (gastroesophageal reflux disease)     Hypertension     Hypokalemia 4/9/2023    Hypomagnesemia 4/9/2023       Past Surgical History:  Past Surgical History:   Procedure Laterality Date    APPENDECTOMY      CARDIAC CATHETERIZATION N/A 4/10/2023    Procedure: Left Heart Cath and coronary angiogram;  Surgeon: Matias Loyola MD;  Location: [facility] CATH INVASIVE LOCATION;  Service: Cardiovascular;  Laterality: N/A;    CARDIAC CATHETERIZATION N/A 4/10/2023    Procedure: Percutaneous Coronary Intervention;  Surgeon: Matias Loyola MD;  Location: [facility] CATH INVASIVE LOCATION;  Service: Cardiovascular;  Laterality: N/A;    ENDOSCOPY N/A " 8/10/2022    Procedure: EGD WITH DILATION with 42 bougie and 12-15mm balloon to 15mm;  Surgeon: RAJAN Moctezuma MD;  Location: [facility] ENDOSCOPY;  Service: Gastroenterology;  Laterality: N/A;  esophagitis and hiatal hernia    ENDOSCOPY N/A 11/15/2022    Procedure: ESOPHAGOGASTRODUODENOSCOPY WITH DILATION ( BOUGIE 46, );  Surgeon: RAJAN Moctezuma MD;  Location: [facility] ENDOSCOPY;  Service: Gastroenterology;  Laterality: N/A;  HIATIAL HERNIA  GASTRITIS  ESOPHOGEAL STRICTURE    ENDOSCOPY N/A 1/19/2023    Procedure: ESOPHAGOGASTRODUODENOSCOPY with esophageal dilation (bougie 50, 52) and biopsy x 3 areas;  Surgeon: RAJAN Moctezuma MD;  Location: [facility] ENDOSCOPY;  Service: Gastroenterology;  Laterality: N/A;  esophagitis, hiatal hernia, esophageal stricture         Social History:  Social History     Tobacco Use    Smoking status: Never    Smokeless tobacco: Never   Vaping Use    Vaping Use: Never used   Substance Use Topics    Alcohol use: Not Currently    Drug use: Never     Comment: suboxone       Family History:  No family history on file.    Medications:  (Not in a hospital admission)      Scheduled Meds:azithromycin, 500 mg, Intravenous, Once  [START ON 1/26/2024] azithromycin, 500 mg, Intravenous, Q24H  [START ON 1/26/2024] cefTRIAXone, 2,000 mg, Intravenous, Q24H  insulin glargine, 1-200 Units, Subcutaneous, Nightly - Glucommander  insulin lispro, 1-200 Units, Subcutaneous, 4x Daily With Meals & Nightly  senna-docusate sodium, 2 tablet, Oral, BID  sodium chloride, 10 mL, Intravenous, Q12H      Continuous Infusions:lactated ringers, 125 mL/hr, Last Rate: 125 mL/hr (01/25/24 0811)      PRN Meds:.  acetaminophen    senna-docusate sodium **AND** polyethylene glycol **AND** bisacodyl **AND** bisacodyl    dextrose    dextrose    glucagon (human recombinant)    insulin lispro    ipratropium-albuterol    ondansetron    sodium chloride    sodium chloride    sodium chloride    ALLERGIES:  Patient has no known  "allergies.    ROS:  The following systems were reviewed  Constitution:  No fevers, chills, no unintentional weight loss  Skin: no rash, no jaundice  Eyes:  No blurry vision, no eye pain  HENT:  No change in hearing or smell  Resp:  No dyspnea or cough  CV:  No chest pain or palpitations  :  No dysuria, hematuria  Musculoskeletal:  No leg cramps or arthralgias, generalized body pain  Neuro:  No tremor, no numbness  Psych:  No depression or confusion    Objective chronically ill-appearing but no acute distress, no family present    Vital Signs:   Vitals:    01/25/24 0151 01/25/24 0247 01/25/24 0702   BP: 115/88 158/81 97/56   Pulse: 102 90 87   Resp: 18     Temp: 98.1 °F (36.7 °C)     TempSrc: Oral     SpO2: 96% 92% 94%   Weight: 111 kg (245 lb)     Height: 175.3 cm (69\")         Physical Exam:     General Appearance:    Awake and alert, in no acute distress, obese   Head:    Normocephalic, without obvious abnormality, atraumatic   Throat:   No oral lesions, no thrush, oral mucosa moist   Lungs:     Respirations regular, even and unlabored   Chest Wall:    No abnormalities observed   Abdomen:     Soft, non-tender, nondistended   Rectal:     Deferred   Extremities:   Moves all extremities, no cyanosis       Skin:   No rash, no jaundice, normal palpation       Neurologic:   Cranial nerves 2 - 12 grossly intact       Results Review:   I reviewed the patient's labs and imaging.  CBC    Results from last 7 days   Lab Units 01/25/24  0219   WBC 10*3/mm3 12.10*   HEMOGLOBIN g/dL 13.3   PLATELETS 10*3/mm3 228     CMP   Results from last 7 days   Lab Units 01/25/24  0244   SODIUM mmol/L 131*   POTASSIUM mmol/L 4.5   CHLORIDE mmol/L 94*   CO2 mmol/L 25.0   BUN mg/dL 23   CREATININE mg/dL 1.12   GLUCOSE mg/dL 423*   ALBUMIN g/dL 4.0   BILIRUBIN mg/dL 1.1   ALK PHOS U/L 115   AST (SGOT) U/L 35   ALT (SGPT) U/L 21   LIPASE U/L 24     Cr Clearance Estimated Creatinine Clearance: 71 mL/min (by C-G formula based on SCr of 1.12 " mg/dL).  [facility] [place]A1C   Lab Results   Component Value Date    HGBA1C 11.40 (H) 01/25/2024    HGBA1C 7.40 (H) 04/09/2023     Blood Glucose   Glucose   Date/Time Value Ref Range Status   01/25/2024 0216 372 (H) 70 - 105 mg/dL Final     Comment:     Serial Number: [id number]:  [id number]     Infection     UA      Imaging Results (Last 72 Hours)       Procedure Component Value Units Date/Time    CT Abdomen Pelvis With Contrast [[id number]] Collected: 01/25/24 0443     Updated: 01/25/24 0457    Narrative:      CT ABDOMEN PELVIS W CONTRAST    Date of Exam: 1/25/2024 4:29 AM EST    Indication: pain.    Comparison: 5/21/2022.    Technique: Axial CT images were obtained of the abdomen and pelvis following the uneventful intravenous administration of iodinated contrast. Sagittal and coronal reconstructions were performed.  Automated exposure control and iterative reconstruction   methods were used.        Findings:  Previously seen 5 mm right lower lobe nodule is semisolid and barely perceptible on today's exam. There is patchy alveolar disease in the left lower lobe concerning for pneumonia. There is a small stomach and fat-containing hiatal hernia. The heart size   is normal. There is fluid in the distal esophagus. No acute findings in the superficial soft tissues. There are no acute osseous abnormalities or destructive bone lesions. There are moderate thoracolumbar degenerative changes.    The liver, gallbladder, bile ducts, pancreas, mid and distal stomach and spleen appear unremarkable. The adrenal glands appear normal. There is an exophytic hemorrhagic or proteinaceous cyst at the superior right kidney measuring 18 mm. There is a small   fatty nodule at the upper pole the right kidney, which may represent fatty interdigitation or a small angiomyolipoma. The left kidney is normal. No urolithiasis or hydronephrosis. The prostate gland and urinary bladder appear within normal limits.   Patient is status post  appendectomy. There is mild colonic fecal retention. Small bowel is nondistended. There is mild stable haziness in the mesentery without adenopathy. There is minimal aortoiliac atherosclerosis. No ascites, pneumoperitoneum or   lymphadenopathy.      Impression:      Impression:  1.Left lower lobe pneumonia.  2.No acute abdominal or pelvic abnormality.  3.Mild colonic fecal retention.  4.Small stomach and fat-containing hiatal hernia.  5.Previously seen 5 mm right lower lobe nodule is semisolid and barely perceptible on today's exam.        Electronically Signed: Yfn Crow MD    1/25/2024 4:55 AM EST    Workstation ID: [id number]            ASSESSMENT:  Solid food dysphagia  Nausea with intermittent vomiting and upper abdominal discomfort  Left lower lobe pneumonia  Hyperglycemia/Poorly controlled diabetes -A1c 11.4%  History of CAD -Plavix and aspirin  Suboxone use    PLAN:  Patient is a 73-year-old male with poorly controlled diabetes and CAD on aspirin and Plavix.  He presents with feeling poorly over the last several days with generalized body pain, nausea and intermittent vomiting.  He was found to have pneumonia with hyperglycemia and A1c 11.4%.  Apparently he was not taking his diabetes medications at home.  Also on Suboxone.    Glucose 423 with A1c 11.4%.  Diabetes management per primary or endocrinology.  LFTs normal, lipase normal and CT with no acute abdominal abnormality but mild colonic fecal retention and small stomach with fat-containing hiatal hernia.  We will proceed with EGD and likely dilation with history of esophageal stricture.  Continue PPI.  Strict diabetes control recommended as possible underlying gastroparesis in the differential.  Avoid NSAID use.  Pneumonia on imaging, currently on ceftriaxone and azithromycin.  Further recommendations to follow EGD findings.  Would recommend outpatient screening colonoscopy.    I discussed the patients findings and my recommendations with the  patient.    We appreciate the referral    Electronically signed by TED Gilmore, 01/25/24, 9:20 AM EST.

## 2024-01-25 NOTE — ED NOTES
EGD consent at bedside. Patient was assisted to bedside to urinate. Patient seems unsteady on feet but states he is tried. 200ML urine void out

## 2024-01-25 NOTE — H&P
"    [facility] Medicine Services    Hospitalist History and Physical     Manfred Perry : 1950 MRN:[id number] LOS:0 ROOM:      Reason for admission: Left lower lobe pneumonia     Assessment / Plan     Abdominal pain with nausea and vomiting  Difficulty swallowing with hx of esophageal dilation  Large hiatal hernia seen on CT  LLL Pneumonia  -Pain improved  -Analgesics as needed  -Started on Rocephin and azithromycin for left lower lobe pneumonia  -Will consult GI for difficulty swallowing  -Bowel medications for constipation  - rocephin , azithromycin  - urine legionella and strep  - COVID and flu negative   - sat stable on room air  - prn duo neb  - IS  - GI consult     Uncontrolled DM   - pt reports \" I wasn't taking the metformin because didn't know what it was for\"  - started on new medication, unknown name within past couple days  - A1C  - Glucomander  - Diabetic educator consult      CAD s/p stent/ HTN/ HLD  - pending home medication review    Insomnia/ anxiety  - pending home medication review    Hypothyroid  - check TSH  - on synthroid    GERD  -PPI     Constipation   -bowel medication scheduled and PRN      Nutrition:   Diet: Liquid Diets; Clear Liquid; Fluid Consistency: Thin (IDDSI 0)     DVT prophylaxis:  Mechanical DVT prophylaxis orders are present.         History of Present illness     Manfred Perry is a 73 y.o. male with PMH of hypothyroidism, urinary artery disease status post stent, hyperlipidemia GERD, hypertension and  diabetes presented to the hospital on 2024, and was admitted with a principal diagnosis of Left lower lobe pneumonia.     Patient presents to the ED this evening feeling unwell since about 3 PM.  Had nausea upper abdominal pain.  Vomited 3-4 times.  Noted some difficulty with swallowing and spitting up food for the last month.  Seenprimary care on Wednesday and was found to have elevated blood sugar.  It was noted patient was started on metformin back in " December.  Patient did not disclose to PCP difficulty swallowing.  He has had esophageal dilation in the past.    CT of the abdomen pelvis was positive for left lower lobe pneumonia.  No acute abdominal or pelvic abnormality.  Positive for signs of constipation with mild colonic fecal retention.  Small stomach and fat containing hiatal hernia.  Previously seen 5 mm right lower lobe nodule is semisolid and barely perceivable on today's exam.  Sodium 131 chloride 94 glucose 423 lipase 24 white blood cell 12.1.  Patient has been started on Rocephin and azithromycin.  Will consult diabetes educator.  He has been admitted for further treatment        Patient was seen and examined on 01/25/24 at 06:24 EST .    Subjective / Review of systems     Review of Systems   Constitutional:  Positive for fatigue.   Respiratory:  Positive for cough.    Gastrointestinal:  Positive for abdominal pain, constipation, nausea and vomiting.        Difficulty swallowing   Neurological:  Positive for weakness.   All other systems reviewed and are negative.         Past Medical/Surgical/Social/Family History & Allergies     Past Medical History:   Diagnosis Date    Disease of thyroid gland     Dysphagia 01/2023    GERD (gastroesophageal reflux disease)     Hypertension     Hypokalemia 4/9/2023    Hypomagnesemia 4/9/2023      Past Surgical History:   Procedure Laterality Date    APPENDECTOMY      CARDIAC CATHETERIZATION N/A 4/10/2023    Procedure: Left Heart Cath and coronary angiogram;  Surgeon: Matias Loyola MD;  Location: [facility] INVASIVE LOCATION;  Service: Cardiovascular;  Laterality: N/A;    CARDIAC CATHETERIZATION N/A 4/10/2023    Procedure: Percutaneous Coronary Intervention;  Surgeon: Matias Loyola MD;  Location: [facility] INVASIVE LOCATION;  Service: Cardiovascular;  Laterality: N/A;    ENDOSCOPY N/A 8/10/2022    Procedure: EGD WITH DILATION with 42 bougie and 12-15mm balloon to 15mm;  Surgeon: RAJAN Moctezuma MD;  Location:  [facility] ENDOSCOPY;  Service: Gastroenterology;  Laterality: N/A;  esophagitis and hiatal hernia    ENDOSCOPY N/A 11/15/2022    Procedure: ESOPHAGOGASTRODUODENOSCOPY WITH DILATION ( BOUGIE 46, );  Surgeon: RAJAN Moctezuma MD;  Location: [facility] ENDOSCOPY;  Service: Gastroenterology;  Laterality: N/A;  HIATIAL HERNIA  GASTRITIS  ESOPHOGEAL STRICTURE    ENDOSCOPY N/A 1/19/2023    Procedure: ESOPHAGOGASTRODUODENOSCOPY with esophageal dilation (bougie 50, 52) and biopsy x 3 areas;  Surgeon: RAJAN Moctezuma MD;  Location: [facility] ENDOSCOPY;  Service: Gastroenterology;  Laterality: N/A;  esophagitis, hiatal hernia, esophageal stricture        Social History     Socioeconomic History    Marital status:    Tobacco Use    Smoking status: Never    Smokeless tobacco: Never   Vaping Use    Vaping Use: Never used   Substance and Sexual Activity    Alcohol use: Not Currently    Drug use: Never     Comment: suboxone    Sexual activity: Defer      No family history on file.   No Known Allergies   Social Determinants of Health     Tobacco Use: Low Risk  (12/20/2023)    Patient History     Smoking Tobacco Use: Never     Smokeless Tobacco Use: Never     Passive Exposure: Not on file   Alcohol Use: Not At Risk (4/9/2023)    AUDIT-C     Frequency of Alcohol Consumption: Never     Average Number of Drinks: Patient does not drink     Frequency of Binge Drinking: Never   Financial Resource Strain: Not on file   Food Insecurity: Not on file   Transportation Needs: Not on file   Physical Activity: Not on file   Stress: Not on file   Social Connections: Unknown (10/12/2023)    Family and Community Support     Help with Day-to-Day Activities: Not on file     Lonely or Isolated: Not on file   Interpersonal Safety: Not At Risk (1/25/2024)    Abuse Screen     Unsafe at Home or Work/School: no     Feels Threatened by Someone?: no     Does Anyone Keep You from Contacting Others or Doint Things Outside the Home?: no     Physical Sign of Abuse  Present: no   Depression: Not on file   Housing Stability: Unknown (4/10/2023)    Housing Stability     Current Living Arrangements: home     Potentially Unsafe Housing Conditions: Not on file   Utilities: Not on file   Health Literacy: Unknown (4/10/2023)    Education     Help with school or training?: Not on file     Preferred Language: English   Employment: Unknown (10/12/2023)    Employment     Do you want help finding or keeping work or a job?: Not on file   Disabilities: Not At Risk (4/9/2023)    Disabilities     Concentrating, Remembering, or Making Decisions Difficulty: no     Doing Errands Independently Difficulty: no        Home Medications     Prior to Admission medications    Medication Sig Start Date End Date Taking? Authorizing Provider   aspirin 81 MG chewable tablet Chew 1 tablet Daily. 4/12/23   Omi Lane MD   atorvastatin (LIPITOR) 40 MG tablet Take 1 tablet by mouth Every Night. 4/11/23   Omi Lane MD   buprenorphine-naloxone (SUBOXONE) 8-2 MG per SL tablet Place 1 tablet under the tongue 2 (Two) Times a Day.    Adri Mcguire MD   clopidogrel (PLAVIX) 75 MG tablet Take 1 tablet by mouth Daily. 12/20/23   Matias Loyola MD   hydroCHLOROthiazide (HYDRODIURIL) 25 MG tablet Take 1 tablet by mouth Daily.    Adri Mcguire MD   hydrOXYzine (ATARAX) 25 MG tablet Take 1 tablet by mouth Every 6 (Six) Hours As Needed for Anxiety. 12/11/20   Nola Knight PA   levothyroxine (Synthroid) 50 MCG tablet Take 1 tablet by mouth Daily. 4/11/23   Omi Lane MD   losartan (COZAAR) 100 MG tablet Take 1 tablet by mouth Daily.    Adri Mcguire MD   magnesium oxide (MAG-OX) 400 MG tablet Take 1 tablet by mouth Daily. 4/11/23   Omi Lane MD   metoprolol tartrate (LOPRESSOR) 25 MG tablet Take 1 tablet by mouth Every 12 (Twelve) Hours. 12/20/23   Matias Loyola MD   nitroglycerin (NITROSTAT) 0.4 MG SL tablet Place 1 tablet under the tongue Every 5  (Five) Minutes As Needed for Chest Pain (Only if SBP Greater Than 100). Take no more than 3 doses in 15 minutes. 12/20/23   Matias Loyola MD   ondansetron ODT (ZOFRAN-ODT) 4 MG disintegrating tablet Place 1 tablet on the tongue Every 8 (Eight) Hours As Needed for Nausea or Vomiting. 12/11/20   Nola Knight PA   pantoprazole (Protonix) 40 MG EC tablet Take 1 tablet by mouth Daily. 4/11/23   Omi Lane MD        Objective / Physical Exam     Vital signs:  Temp: 98.1 °F (36.7 °C)  BP: 158/81  Heart Rate: 90  Resp: 18  SpO2: 92 %  Weight: 111 kg (245 lb)    Admission Weight: Weight: 111 kg (245 lb)    Physical Exam  Constitutional:       Appearance: He is ill-appearing.   Eyes:      Pupils: Pupils are equal, round, and reactive to light.   Cardiovascular:      Rate and Rhythm: Normal rate and regular rhythm.   Pulmonary:      Effort: Pulmonary effort is normal.      Breath sounds: Normal breath sounds.   Abdominal:      Palpations: Abdomen is soft.      Tenderness: There is no abdominal tenderness.   Musculoskeletal:         General: Normal range of motion.   Skin:     General: Skin is warm and dry.   Neurological:      Mental Status: He is alert and oriented to person, place, and time.   Psychiatric:         Behavior: Behavior normal.            Labs     Results from last 7 days   Lab Units 01/25/24  0219   WBC 10*3/mm3 12.10*   HEMOGLOBIN g/dL 13.3   HEMATOCRIT % 39.7   PLATELETS 10*3/mm3 228      Results from last 7 days   Lab Units 01/25/24  0244   ALK PHOS U/L 115   AST (SGOT) U/L 35   ALT (SGPT) U/L 21           Results from last 7 days   Lab Units 01/25/24  0244   SODIUM mmol/L 131*   POTASSIUM mmol/L 4.5   CHLORIDE mmol/L 94*   CO2 mmol/L 25.0   BUN mg/dL 23   CREATININE mg/dL 1.12   GLUCOSE mg/dL 423*        Imaging     CT Abdomen Pelvis With Contrast    Result Date: 1/25/2024  CT ABDOMEN PELVIS W CONTRAST Date of Exam: 1/25/2024 4:29 AM EST Indication: pain. Comparison: 5/21/2022. Technique:  Axial CT images were obtained of the abdomen and pelvis following the uneventful intravenous administration of iodinated contrast. Sagittal and coronal reconstructions were performed.  Automated exposure control and iterative reconstruction methods were used. Findings: Previously seen 5 mm right lower lobe nodule is semisolid and barely perceptible on today's exam. There is patchy alveolar disease in the left lower lobe concerning for pneumonia. There is a small stomach and fat-containing hiatal hernia. The heart size is normal. There is fluid in the distal esophagus. No acute findings in the superficial soft tissues. There are no acute osseous abnormalities or destructive bone lesions. There are moderate thoracolumbar degenerative changes. The liver, gallbladder, bile ducts, pancreas, mid and distal stomach and spleen appear unremarkable. The adrenal glands appear normal. There is an exophytic hemorrhagic or proteinaceous cyst at the superior right kidney measuring 18 mm. There is a small fatty nodule at the upper pole the right kidney, which may represent fatty interdigitation or a small angiomyolipoma. The left kidney is normal. No urolithiasis or hydronephrosis. The prostate gland and urinary bladder appear within normal limits. Patient is status post appendectomy. There is mild colonic fecal retention. Small bowel is nondistended. There is mild stable haziness in the mesentery without adenopathy. There is minimal aortoiliac atherosclerosis. No ascites, pneumoperitoneum or lymphadenopathy.     Impression: 1.Left lower lobe pneumonia. 2.No acute abdominal or pelvic abnormality. 3.Mild colonic fecal retention. 4.Small stomach and fat-containing hiatal hernia. 5.Previously seen 5 mm right lower lobe nodule is semisolid and barely perceptible on today's exam. Electronically Signed: Yfn Crow MD  1/25/2024 4:55 AM EST  Workstation ID: [id number]      ECG 12 Lead Chest Pain   Final Result   HEART RATE= 89  bpm   RR  Interval= 672  ms   HI Interval= 170  ms   P Horizontal Axis= 22  deg   P Front Axis= 18  deg   QRSD Interval= 87  ms   QT Interval= 448  ms   QTcB= 547  ms   QRS Axis= -3  deg   T Wave Axis= 31  deg   - ABNORMAL ECG -   Sinus rhythm   Prolonged QT interval   When compared with ECG of 11-Apr-2023 5:38:22,   Significant rate increase   Significant repolarization change   Electronically Signed By: Eric Anand ([email or web address]) 25-Jan-2024 06:08:39   Date and Time of Study: 2024-01-25 03:40:24           Current Medications     Scheduled Meds:  azithromycin, 500 mg, Intravenous, Once  [START ON 1/26/2024] azithromycin, 500 mg, Intravenous, Q24H  [START ON 1/26/2024] cefTRIAXone, 2,000 mg, Intravenous, Q24H  insulin glargine, 1-200 Units, Subcutaneous, Nightly - Glucommander  insulin lispro, 1-200 Units, Subcutaneous, 4x Daily With Meals & Nightly  senna-docusate sodium, 2 tablet, Oral, BID  sodium chloride, 10 mL, Intravenous, Q12H         Continuous Infusions:  lactated ringers, 125 mL/hr, Last Rate: 125 mL/hr (01/25/24 0237)           Kathryn Abraham, [facility] Medicine  01/25/24   06:24 EST

## 2024-01-25 NOTE — ED NOTES
[facility] called about patient poc Glucose readings. [facility]it[facility] states that the will get order for sliding scale if need be and they will still come retrieve patient for EGD.

## 2024-01-26 ENCOUNTER — INPATIENT HOSPITAL (AMBULATORY)
Dept: URBAN - METROPOLITAN AREA HOSPITAL 84 | Facility: HOSPITAL | Age: 74
End: 2024-01-26
Payer: COMMERCIAL

## 2024-01-26 DIAGNOSIS — Z79.02 LONG TERM (CURRENT) USE OF ANTITHROMBOTICS/ANTIPLATELETS: ICD-10-CM

## 2024-01-26 DIAGNOSIS — R13.10 DYSPHAGIA, UNSPECIFIED: ICD-10-CM

## 2024-01-26 DIAGNOSIS — R11.2 NAUSEA WITH VOMITING, UNSPECIFIED: ICD-10-CM

## 2024-01-26 DIAGNOSIS — R10.10 UPPER ABDOMINAL PAIN, UNSPECIFIED: ICD-10-CM

## 2024-01-26 DIAGNOSIS — E11.65 TYPE 2 DIABETES MELLITUS WITH HYPERGLYCEMIA: ICD-10-CM

## 2024-01-26 DIAGNOSIS — Z79.82 LONG TERM (CURRENT) USE OF ASPIRIN: ICD-10-CM

## 2024-01-26 DIAGNOSIS — K44.9 DIAPHRAGMATIC HERNIA WITHOUT OBSTRUCTION OR GANGRENE: ICD-10-CM

## 2024-01-26 DIAGNOSIS — K22.2 ESOPHAGEAL OBSTRUCTION: ICD-10-CM

## 2024-01-26 LAB
ALBUMIN SERPL-MCNC: 3.5 G/DL (ref 3.5–5.2)
ALBUMIN/GLOB SERPL: 1 G/DL
ALP SERPL-CCNC: 84 U/L (ref 39–117)
ALT SERPL W P-5'-P-CCNC: 18 U/L (ref 1–41)
ANION GAP SERPL CALCULATED.3IONS-SCNC: 8 MMOL/L (ref 5–15)
AST SERPL-CCNC: 36 U/L (ref 1–40)
BASOPHILS # BLD AUTO: 0 10*3/MM3 (ref 0–0.2)
BASOPHILS NFR BLD AUTO: 0.2 % (ref 0–1.5)
BILIRUB SERPL-MCNC: 0.4 MG/DL (ref 0–1.2)
BUN SERPL-MCNC: 20 MG/DL (ref 8–23)
BUN/CREAT SERPL: 19 (ref 7–25)
CALCIUM SPEC-SCNC: 8.1 MG/DL (ref 8.6–10.5)
CHLORIDE SERPL-SCNC: 100 MMOL/L (ref 98–107)
CO2 SERPL-SCNC: 28 MMOL/L (ref 22–29)
CREAT SERPL-MCNC: 1.05 MG/DL (ref 0.76–1.27)
DEPRECATED RDW RBC AUTO: 45.9 FL (ref 37–54)
EGFRCR SERPLBLD CKD-EPI 2021: 74.5 ML/MIN/1.73
EOSINOPHIL # BLD AUTO: 0 10*3/MM3 (ref 0–0.4)
EOSINOPHIL NFR BLD AUTO: 0.3 % (ref 0.3–6.2)
ERYTHROCYTE [DISTWIDTH] IN BLOOD BY AUTOMATED COUNT: 13.9 % (ref 12.3–15.4)
GLOBULIN UR ELPH-MCNC: 3.5 GM/DL
GLUCOSE BLDC GLUCOMTR-MCNC: 179 MG/DL (ref 70–105)
GLUCOSE BLDC GLUCOMTR-MCNC: 189 MG/DL (ref 70–105)
GLUCOSE BLDC GLUCOMTR-MCNC: 231 MG/DL (ref 70–105)
GLUCOSE BLDC GLUCOMTR-MCNC: 250 MG/DL (ref 70–105)
GLUCOSE SERPL-MCNC: 233 MG/DL (ref 65–99)
HCT VFR BLD AUTO: 35.3 % (ref 37.5–51)
HGB BLD-MCNC: 11.8 G/DL (ref 13–17.7)
L PNEUMO1 AG UR QL IA: NEGATIVE
LYMPHOCYTES # BLD AUTO: 0.9 10*3/MM3 (ref 0.7–3.1)
LYMPHOCYTES NFR BLD AUTO: 9.4 % (ref 19.6–45.3)
MCH RBC QN AUTO: 30.1 PG (ref 26.6–33)
MCHC RBC AUTO-ENTMCNC: 33.3 G/DL (ref 31.5–35.7)
MCV RBC AUTO: 90.3 FL (ref 79–97)
MONOCYTES # BLD AUTO: 0.7 10*3/MM3 (ref 0.1–0.9)
MONOCYTES NFR BLD AUTO: 7.3 % (ref 5–12)
NEUTROPHILS NFR BLD AUTO: 7.5 10*3/MM3 (ref 1.7–7)
NEUTROPHILS NFR BLD AUTO: 82.8 % (ref 42.7–76)
NRBC BLD AUTO-RTO: 0.2 /100 WBC (ref 0–0.2)
PLATELET # BLD AUTO: 174 10*3/MM3 (ref 140–450)
PMV BLD AUTO: 9.2 FL (ref 6–12)
POTASSIUM SERPL-SCNC: 4 MMOL/L (ref 3.5–5.2)
PROT SERPL-MCNC: 7 G/DL (ref 6–8.5)
RBC # BLD AUTO: 3.91 10*6/MM3 (ref 4.14–5.8)
S PNEUM AG SPEC QL LA: NEGATIVE
SODIUM SERPL-SCNC: 136 MMOL/L (ref 136–145)
WBC NRBC COR # BLD AUTO: 9 10*3/MM3 (ref 3.4–10.8)

## 2024-01-26 PROCEDURE — 82948 REAGENT STRIP/BLOOD GLUCOSE: CPT

## 2024-01-26 PROCEDURE — 63710000001 INSULIN GLARGINE PER 5 UNITS: Performed by: NURSE PRACTITIONER

## 2024-01-26 PROCEDURE — 97165 OT EVAL LOW COMPLEX 30 MIN: CPT

## 2024-01-26 PROCEDURE — 87899 AGENT NOS ASSAY W/OPTIC: CPT | Performed by: NURSE PRACTITIONER

## 2024-01-26 PROCEDURE — 82948 REAGENT STRIP/BLOOD GLUCOSE: CPT | Performed by: NURSE PRACTITIONER

## 2024-01-26 PROCEDURE — 99232 SBSQ HOSP IP/OBS MODERATE 35: CPT | Performed by: NURSE PRACTITIONER

## 2024-01-26 PROCEDURE — 25010000002 AZITHROMYCIN PER 500 MG: Performed by: NURSE PRACTITIONER

## 2024-01-26 PROCEDURE — 63710000001 INSULIN LISPRO (HUMAN) PER 5 UNITS: Performed by: NURSE PRACTITIONER

## 2024-01-26 PROCEDURE — 87449 NOS EACH ORGANISM AG IA: CPT | Performed by: NURSE PRACTITIONER

## 2024-01-26 PROCEDURE — 25010000002 CEFTRIAXONE PER 250 MG: Performed by: NURSE PRACTITIONER

## 2024-01-26 PROCEDURE — 25810000003 SODIUM CHLORIDE 0.9 % SOLUTION 250 ML FLEX CONT: Performed by: NURSE PRACTITIONER

## 2024-01-26 PROCEDURE — 97161 PT EVAL LOW COMPLEX 20 MIN: CPT

## 2024-01-26 RX ORDER — METOCLOPRAMIDE 5 MG/1
5 TABLET ORAL
Status: DISCONTINUED | OUTPATIENT
Start: 2024-01-26 | End: 2024-01-27 | Stop reason: HOSPADM

## 2024-01-26 RX ADMIN — INSULIN GLARGINE 34 UNITS: 100 INJECTION, SOLUTION SUBCUTANEOUS at 21:38

## 2024-01-26 RX ADMIN — HYDROXYZINE HYDROCHLORIDE 25 MG: 25 TABLET, FILM COATED ORAL at 09:29

## 2024-01-26 RX ADMIN — CEFTRIAXONE 2000 MG: 2 INJECTION, POWDER, FOR SOLUTION INTRAMUSCULAR; INTRAVENOUS at 04:36

## 2024-01-26 RX ADMIN — SUCRALFATE 1 G: 1 TABLET ORAL at 07:55

## 2024-01-26 RX ADMIN — Medication 400 MG: at 09:28

## 2024-01-26 RX ADMIN — METOCLOPRAMIDE 5 MG: 5 TABLET ORAL at 12:15

## 2024-01-26 RX ADMIN — SUCRALFATE 1 G: 1 TABLET ORAL at 16:41

## 2024-01-26 RX ADMIN — INSULIN LISPRO 3 UNITS: 100 INJECTION, SOLUTION INTRAVENOUS; SUBCUTANEOUS at 16:45

## 2024-01-26 RX ADMIN — LOSARTAN POTASSIUM 100 MG: 50 TABLET, FILM COATED ORAL at 09:28

## 2024-01-26 RX ADMIN — ATORVASTATIN CALCIUM 40 MG: 40 TABLET, FILM COATED ORAL at 20:46

## 2024-01-26 RX ADMIN — BUPRENORPHINE HYDROCHLORIDE AND NALOXONE HYDROCHLORIDE DIHYDRATE 1 TABLET: 8; 2 TABLET SUBLINGUAL at 09:28

## 2024-01-26 RX ADMIN — INSULIN LISPRO 3 UNITS: 100 INJECTION, SOLUTION INTRAVENOUS; SUBCUTANEOUS at 16:46

## 2024-01-26 RX ADMIN — LEVOTHYROXINE SODIUM 50 MCG: 0.05 TABLET ORAL at 05:11

## 2024-01-26 RX ADMIN — BUPRENORPHINE HYDROCHLORIDE AND NALOXONE HYDROCHLORIDE DIHYDRATE 1 TABLET: 8; 2 TABLET SUBLINGUAL at 20:46

## 2024-01-26 RX ADMIN — PANTOPRAZOLE SODIUM 40 MG: 40 TABLET, DELAYED RELEASE ORAL at 16:41

## 2024-01-26 RX ADMIN — INSULIN LISPRO 8 UNITS: 100 INJECTION, SOLUTION INTRAVENOUS; SUBCUTANEOUS at 09:27

## 2024-01-26 RX ADMIN — DOCUSATE SODIUM 50MG AND SENNOSIDES 8.6MG 2 TABLET: 8.6; 5 TABLET, FILM COATED ORAL at 09:28

## 2024-01-26 RX ADMIN — METOCLOPRAMIDE 5 MG: 5 TABLET ORAL at 16:41

## 2024-01-26 RX ADMIN — SUCRALFATE 1 G: 1 TABLET ORAL at 20:46

## 2024-01-26 RX ADMIN — Medication 10 ML: at 09:29

## 2024-01-26 RX ADMIN — METOPROLOL TARTRATE 25 MG: 25 TABLET, FILM COATED ORAL at 09:28

## 2024-01-26 RX ADMIN — ACETAMINOPHEN 650 MG: 325 TABLET, FILM COATED ORAL at 08:07

## 2024-01-26 RX ADMIN — AZITHROMYCIN MONOHYDRATE 500 MG: 500 INJECTION, POWDER, LYOPHILIZED, FOR SOLUTION INTRAVENOUS at 05:11

## 2024-01-26 RX ADMIN — PANTOPRAZOLE SODIUM 40 MG: 40 TABLET, DELAYED RELEASE ORAL at 07:56

## 2024-01-26 RX ADMIN — ASPIRIN 81 MG CHEWABLE TABLET 81 MG: 81 TABLET CHEWABLE at 09:28

## 2024-01-26 RX ADMIN — SUCRALFATE 1 G: 1 TABLET ORAL at 12:15

## 2024-01-26 RX ADMIN — DOCUSATE SODIUM 50MG AND SENNOSIDES 8.6MG 2 TABLET: 8.6; 5 TABLET, FILM COATED ORAL at 20:46

## 2024-01-26 RX ADMIN — METOCLOPRAMIDE 10 MG: 10 TABLET ORAL at 07:55

## 2024-01-26 RX ADMIN — ACETAMINOPHEN 650 MG: 325 TABLET, FILM COATED ORAL at 19:20

## 2024-01-26 RX ADMIN — CLOPIDOGREL BISULFATE 75 MG: 75 TABLET ORAL at 09:28

## 2024-01-26 RX ADMIN — POLYETHYLENE GLYCOL 3350 17 G: 17 POWDER, FOR SOLUTION ORAL at 09:27

## 2024-01-26 RX ADMIN — Medication 10 ML: at 20:47

## 2024-01-26 NOTE — PLAN OF CARE
Goal Outcome Evaluation:  Plan of Care Reviewed With: patient           Outcome Evaluation: 74 y/o male admitted on 1/25 due to abd pain, nausea/vomiting and found with pna. EGD on 1/25 showed esophagitis and hiatal hernia. PMH Includes DM, anxiety, CAD. Pt lives alone and normally independent , not on supplemental O2. At time of eval, patien ton 2l/nc and noted desat to 88% on room air with exertion. No SOA /dizziness reported. Patient is independent with bed mobility and transfers. Good standing dynamic balance observed however was unable to ambulate patient due to no available IV pole and RN not available. BLE mm strength WNL ,patient appears to be near or at his baseline. May need 6MWT to determine supplemental O2 needs at dc.      Anticipated Discharge Disposition (PT): home

## 2024-01-26 NOTE — CASE MANAGEMENT/SOCIAL WORK
Continued Stay Note  SONA Gutierrez     Patient Name: Manfred Perry  MRN: [id number]  Today's Date: 1/26/2024    Admit Date: 1/25/2024    Plan: Return home. Watch for new home O2.   Discharge Plan       Row Name 01/26/24 1610       Plan    Plan Return home. Watch for new home O2.    Plan Comments Pt transferred from ED today. Continues on IV abx and requiring 3L O2. Per previous CM note, pt does not wear home O2. Pt will need walking oximetry test at discharge.           Megan Naegele, RN     Office Phone: [phone]  Office Cell: [phone]

## 2024-01-26 NOTE — PROGRESS NOTES
" LOS: 0 days   Patient Care Team:  Piedad Maloney APRN as PCP - General (Family Medicine)      Subjective \" I am doing much better\"    Interval History:     Subjective: No further dysphagia.  Tolerating diet.  No nausea or vomiting.  Denies abdominal pain.    ROS:   No chest pain, mild shortness of breath and cough.      Medication Review:     Current Facility-Administered Medications:     acetaminophen (TYLENOL) tablet 650 mg, 650 mg, Oral, Q4H PRN, Kathryn Abraham, APRN, 650 mg at 01/26/24 0807    aspirin chewable tablet 81 mg, 81 mg, Oral, Daily, Colette Abrahama, APRN, 81 mg at 01/26/24 0928    atorvastatin (LIPITOR) tablet 40 mg, 40 mg, Oral, Nightly, Colette Abrahama, APRN, 40 mg at 01/25/24 2239    azithromycin (ZITHROMAX) 500 mg in sodium chloride 0.9 % 250 mL IVPB-VTB, 500 mg, Intravenous, Q24H, Kathryn Abraham, APRN, 500 mg at 01/26/24 0511    sennosides-docusate (PERICOLACE) 8.6-50 MG per tablet 2 tablet, 2 tablet, Oral, BID, 2 tablet at 01/26/24 0928 **AND** polyethylene glycol (MIRALAX) packet 17 g, 17 g, Oral, Daily PRN **AND** bisacodyl (DULCOLAX) EC tablet 5 mg, 5 mg, Oral, Daily PRN **AND** bisacodyl (DULCOLAX) suppository 10 mg, 10 mg, Rectal, Daily PRN, Colette Abrahama, APRN    buprenorphine-naloxone (SUBOXONE) 8-2 MG per SL tablet 1 tablet, 1 tablet, Sublingual, BID, Colette Abrahama, APRN, 1 tablet at 01/26/24 0928    cefTRIAXone (ROCEPHIN) 2,000 mg in sodium chloride 0.9 % 100 mL IVPB, 2,000 mg, Intravenous, Q24H, Colette Abrahama, APRN, Last Rate: 200 mL/hr at 01/26/24 0436, 2,000 mg at 01/26/24 0436    clopidogrel (PLAVIX) tablet 75 mg, 75 mg, Oral, Daily, Kathryn Abraham APRN, 75 mg at 01/26/24 0928    dextrose (D50W) (25 g/50 mL) IV injection 10-50 mL, 10-50 mL, Intravenous, Q15 Min PRN, Kathryn Abraham APRN    dextrose (GLUTOSE) oral gel 15 g, 15 g, Oral, Q15 Min PRN, Kathryn Abraham APRN    Glucagon (GLUCAGEN) injection 1 mg, 1 mg, Intramuscular, Q15 Min PRN, Jarad, " Kathryn, APRN    hydrOXYzine (ATARAX) tablet 25 mg, 25 mg, Oral, Daily, Abraham, Kathryn, APRN, 25 mg at 01/26/24 0929    insulin glargine (LANTUS, SEMGLEE) injection 1-200 Units, 1-200 Units, Subcutaneous, Nightly - Glucommander, Abraham, Kathryn, APRN, 28 Units at 01/25/24 2240    insulin lispro (HUMALOG/ADMELOG) injection 1-200 Units, 1-200 Units, Subcutaneous, 4x Daily With Meals & Nightly, Abraham, Kathryn, APRN, 8 Units at 01/26/24 0927    insulin lispro (HUMALOG/ADMELOG) injection 1-200 Units, 1-200 Units, Subcutaneous, PRN, Colette Abrahama, APRN    ipratropium-albuterol (DUO-NEB) nebulizer solution 3 mL, 3 mL, Nebulization, Q4H PRN, Colette Abrahama, APRN    lactated ringers infusion, 125 mL/hr, Intravenous, Continuous, Eric Anand MD, Stopped at 01/25/24 2251    levothyroxine (SYNTHROID, LEVOTHROID) tablet 50 mcg, 50 mcg, Oral, Daily, Abraham, Kathryn, APRN, 50 mcg at 01/26/24 0511    losartan (COZAAR) tablet 100 mg, 100 mg, Oral, Daily, Abraham, Kathryn, APRN, 100 mg at 01/26/24 0928    magnesium oxide (MAG-OX) tablet 400 mg, 400 mg, Oral, Daily, Abraham, Kathryn, APRN, 400 mg at 01/26/24 0928    metoclopramide (REGLAN) tablet 5 mg, 5 mg, Oral, TID OSMIN, Holly Mejía APRN    metoprolol tartrate (LOPRESSOR) tablet 25 mg, 25 mg, Oral, Q12H, Abraham, Kathryn, APRN, 25 mg at 01/26/24 0928    ondansetron (ZOFRAN) injection 4 mg, 4 mg, Intravenous, Q6H PRN, Colette Abrahama, APRN    pantoprazole (PROTONIX) EC tablet 40 mg, 40 mg, Oral, BID AC, Holly Mejía, APRN, 40 mg at 01/26/24 0756    polyethylene glycol (MIRALAX) packet 17 g, 17 g, Oral, Daily, Holly Mejía, TED, 17 g at 01/26/24 0927    QUEtiapine (SEROquel) tablet 50 mg, 50 mg, Oral, Nightly, Kathryn Abraham, APRN    sodium chloride 0.9 % flush 10 mL, 10 mL, Intravenous, PRN, Eric Anand MD    sodium chloride 0.9 % flush 10 mL, 10 mL, Intravenous, Q12H, Kathryn Abraham, APRN, 10 mL at 01/26/24 0929    sodium chloride 0.9 % flush 10  mL, 10 mL, Intravenous, PRN, Abraham, Kathryn, APRN    sodium chloride 0.9 % infusion 40 mL, 40 mL, Intravenous, PRN, Abraham, Kathryn, APRN    sucralfate (CARAFATE) tablet 1 g, 1 g, Oral, 4x Daily AC & at Bedtime, Holly Mejía, APRN, 1 g at 01/26/24 0755    Current Outpatient Medications:     buprenorphine-naloxone (SUBOXONE) 8-2 MG per SL tablet, Place 1 tablet under the tongue 2 (Two) Times a Day., Disp: , Rfl:     clopidogrel (PLAVIX) 75 MG tablet, Take 1 tablet by mouth Daily., Disp: 90 tablet, Rfl: 3    hydroCHLOROthiazide (HYDRODIURIL) 25 MG tablet, Take 1 tablet by mouth Daily., Disp: , Rfl:     losartan (COZAAR) 100 MG tablet, Take 1 tablet by mouth Daily., Disp: , Rfl:     aspirin 81 MG chewable tablet, Chew 1 tablet Daily., Disp: 30 tablet, Rfl: 3    atorvastatin (LIPITOR) 40 MG tablet, Take 1 tablet by mouth Every Night., Disp: 90 tablet, Rfl: 0    Daridorexant HCl (QUVIVIQ) 50 MG tablet, Take 1 tablet by mouth Daily., Disp: , Rfl:     hydrOXYzine (ATARAX) 25 MG tablet, Take 1-2 tablets by mouth Daily., Disp: , Rfl:     levothyroxine (Synthroid) 50 MCG tablet, Take 1 tablet by mouth Daily., Disp: 30 tablet, Rfl: 0    magnesium oxide (MAG-OX) 400 MG tablet, Take 1 tablet by mouth Daily., Disp: 30 tablet, Rfl: 0    metFORMIN (GLUCOPHAGE) 500 MG tablet, Take 1 tablet by mouth 2 (Two) Times a Day With Meals., Disp: , Rfl:     metoprolol tartrate (LOPRESSOR) 25 MG tablet, Take 1 tablet by mouth Every 12 (Twelve) Hours., Disp: 180 tablet, Rfl: 3    QUEtiapine (SEROquel) 100 MG tablet, Take 1-2 tablets by mouth Every Night., Disp: , Rfl:       Objective resting in bed, chronically ill-appearing but no acute distress, no family present    Vital Signs  Vitals:    01/25/24 2016 01/26/24 0114 01/26/24 0510 01/26/24 0800   BP: 120/65 119/53  148/58   BP Location: Right arm Right arm  Left arm   Patient Position: Lying Lying  Lying   Pulse: 69 56 63 79   Resp: 11 12 16 16   Temp: 99.3 °F (37.4 °C) 99.5 °F (37.5  °C)  (!) 101.6 °F (38.7 °C)   TempSrc: Oral Oral  Oral   SpO2: 94% 93% 93% 90%   Weight:       Height:           Physical Exam:     General Appearance:    Awake and alert, in no acute distress, obese   Head:    Normocephalic, without obvious abnormality   Eyes:          Conjunctivae normal, anicteric sclera   Throat:   No oral lesions, no thrush, oral mucosa moist   Neck:    supple, no JVD   Lungs:     respirations regular, even and unlabored   Abdomen:     Soft, non-tender, nondistended   Rectal:     Deferred   Extremities:   no cyanosis   Skin:   No bruising or rash, no jaundice        Results Review:    CBC    Results from last 7 days   Lab Units 01/25/24 2327 01/25/24  0219   WBC 10*3/mm3 9.00 12.10*   HEMOGLOBIN g/dL 11.8* 13.3   PLATELETS 10*3/mm3 174 228     CMP   Results from last 7 days   Lab Units 01/25/24 2327 01/25/24  0244   SODIUM mmol/L 136 131*   POTASSIUM mmol/L 4.0 4.5   CHLORIDE mmol/L 100 94*   CO2 mmol/L 28.0 25.0   BUN mg/dL 20 23   CREATININE mg/dL 1.05 1.12   GLUCOSE mg/dL 233* 423*   ALBUMIN g/dL 3.5 4.0   BILIRUBIN mg/dL 0.4 1.1   ALK PHOS U/L 84 115   AST (SGOT) U/L 36 35   ALT (SGPT) U/L 18 21   LIPASE U/L  --  24     Cr Clearance Estimated Creatinine Clearance: 75.8 mL/min (by C-G formula based on SCr of 1.05 mg/dL).  Coag     HbA1C   Lab Results   Component Value Date    HGBA1C 11.40 (H) 01/25/2024    HGBA1C 7.40 (H) 04/09/2023     Results from last 7 days   Lab Units 01/25/24  0244   HSTROP T ng/L 9     Infection   Results from last 7 days   Lab Units 01/25/24  0534   BLOODCX  No growth at 24 hours  No growth at 24 hours     UA      Microbiology Results (last 10 days)       Procedure Component Value - Date/Time    Legionella Antigen, Urine - Urine, Urine, Clean Catch [[id number]]  (Normal) Collected: 01/26/24 0441    Lab Status: Final result Specimen: Urine, Clean Catch Updated: 01/26/24 0529     LEGIONELLA ANTIGEN, URINE Negative    S. Pneumo Ag Urine or CSF - Urine, Urine, Clean  Catch [[id number]]  (Normal) Collected: 01/26/24 0445    Lab Status: Final result Specimen: Urine, Clean Catch Updated: 01/26/24 0529     Strep Pneumo Ag Negative    Blood Culture - Blood, Arm, Right [[id number]]  (Normal) Collected: 01/25/24 0534    Lab Status: Preliminary result Specimen: Blood from Arm, Right Updated: 01/26/24 0545     Blood Culture No growth at 24 hours    Narrative:      Less than seven (7) mL's of blood was collected.  Insufficient quantity may yield false negative results.    Blood Culture - Blood, Arm, Left [[id number]]  (Normal) Collected: 01/25/24 0534    Lab Status: Preliminary result Specimen: Blood from Arm, Left Updated: 01/26/24 0545     Blood Culture No growth at 24 hours    COVID-19 and FLU A/B PCR, 1 HR TAT - Swab, Nasopharynx [[id number]]  (Normal) Collected: 01/25/24 0345    Lab Status: Final result Specimen: Swab from Nasopharynx Updated: 01/25/24 0411     COVID19 Not Detected     Influenza A PCR Not Detected     Influenza B PCR Not Detected    Narrative:      Fact sheet for providers: https://www.fda.gov/media/999477/download    Fact sheet for patients: https://www.fda.gov/media/484645/download    Test performed by PCR.          Imaging Results (Last 72 Hours)       Procedure Component Value Units Date/Time    CT Abdomen Pelvis With Contrast [[id number]] Collected: 01/25/24 0443     Updated: 01/25/24 0457    Narrative:      CT ABDOMEN PELVIS W CONTRAST    Date of Exam: 1/25/2024 4:29 AM EST    Indication: pain.    Comparison: 5/21/2022.    Technique: Axial CT images were obtained of the abdomen and pelvis following the uneventful intravenous administration of iodinated contrast. Sagittal and coronal reconstructions were performed.  Automated exposure control and iterative reconstruction   methods were used.        Findings:  Previously seen 5 mm right lower lobe nodule is semisolid and barely perceptible on today's exam. There is patchy alveolar disease in the left lower lobe  concerning for pneumonia. There is a small stomach and fat-containing hiatal hernia. The heart size   is normal. There is fluid in the distal esophagus. No acute findings in the superficial soft tissues. There are no acute osseous abnormalities or destructive bone lesions. There are moderate thoracolumbar degenerative changes.    The liver, gallbladder, bile ducts, pancreas, mid and distal stomach and spleen appear unremarkable. The adrenal glands appear normal. There is an exophytic hemorrhagic or proteinaceous cyst at the superior right kidney measuring 18 mm. There is a small   fatty nodule at the upper pole the right kidney, which may represent fatty interdigitation or a small angiomyolipoma. The left kidney is normal. No urolithiasis or hydronephrosis. The prostate gland and urinary bladder appear within normal limits.   Patient is status post appendectomy. There is mild colonic fecal retention. Small bowel is nondistended. There is mild stable haziness in the mesentery without adenopathy. There is minimal aortoiliac atherosclerosis. No ascites, pneumoperitoneum or   lymphadenopathy.      Impression:      Impression:  1.Left lower lobe pneumonia.  2.No acute abdominal or pelvic abnormality.  3.Mild colonic fecal retention.  4.Small stomach and fat-containing hiatal hernia.  5.Previously seen 5 mm right lower lobe nodule is semisolid and barely perceptible on today's exam.        Electronically Signed: Yfn Crow MD    1/25/2024 4:55 AM EST    Workstation ID: [id number]            Assessment & Plan   Solid food dysphagia -resolved  Nausea with intermittent vomiting and upper abdominal discomfort -resolved  Left lower lobe pneumonia  Hyperglycemia/Poorly controlled diabetes -A1c 11.4%  History of CAD -Plavix and aspirin  Suboxone use     PLAN:  Patient is a 73-year-old male with poorly controlled diabetes and CAD on aspirin and Plavix.  He presents with feeling poorly over the last several days with generalized  body pain, nausea and intermittent vomiting.  He was found to have pneumonia with hyperglycemia and A1c 11.4%.  Apparently he was not taking his diabetes medications at home.  Also on Suboxone.    EGD yesterday with grade C erosive esophagitis, GE junction stricture s/p balloon dilation, moderate hiatal hernia and changes of chronic gastritis.  We will follow-up on pathology to rule out underlying H. pylori.  Continue twice daily PPI and plan repeat EGD in 10 to 12 weeks for further dilation of stricture.  Agree with low-dose Reglan 3 times daily and Carafate for gastritis.  Suspect underlying GERD/gastroparesis from poorly controlled diabetes.  Strict diabetes control recommended.  Avoid NSAID use.  Hgb 11.8 without evidence of active GI blood loss.  LFTs normal.  Would recommend outpatient screening colonoscopy at time of repeat EGD.  Okay to discharge home from GI standpoint when otherwise medically stable.  We will see inpatient as needed, call questions or concerns.     Electronically signed by TED Gilmore, 01/26/24, 10:37 AM EST.

## 2024-01-26 NOTE — PLAN OF CARE
Goal Outcome Evaluation:   VSS on 2L NC, denies pain/soa, slept most of shift-drowsy-easily awakes/oriented, ivf infusing, iv atb infused as ordered, apnea/snoring while sleeping

## 2024-01-26 NOTE — CONSULTS
Follow-up with patient for review and education. Patient stated he doesn't think he has diabetes. Patient stated he was drinking 6-8 small bottles of regular soda a day and thinks if he stops drinking the soda his blood sugar will get under control. Reviewed with patient his A1c result, target range, and healthy blood sugar range. Explained to patient that drinking unsweetened beverages and watching his diet would help with blood sugar control but with his A1c result he needs help to keep his blood sugar under control. Patient stated he only eats 1-2 meals a day. Patient alert and oriented and stated he has his glasses today. Reviewed handouts with discussion on types of insulin, storage of insulin, injection sites, disposal of needles, rotation of sites, and how to use an insulin pen. Patient did return demonstration of applying the pen needle to the insulin pen, priming the pen, administering the shot into the foam pad,and holding the pen for 10 seconds after administration. Reviewed handouts with discussion on hypoglycemia signs, symptoms, and treatment. Discussed with patient that it is recommended to check blood sugar 3X a day before meals and sometimes at bedtime. Offered patient to review how to check blood sugar but patient said he could prick his finger and check his blood sugar. Discussed with patient that it is recommended to exercise 150 minutes a week minimally alternating days to help with blood sugar control.  Prescriptions started in discharge orders for lancets, test strips, Accu-chek Guide Me glucometer, Lantus, Humalog, and pen needles. Business card with educator contact info given to patient. Patient has no further questions or concerns related to diabetes at this time.

## 2024-01-26 NOTE — THERAPY EVALUATION
Patient Name: Manfred Perry  : 1950    MRN: [id number]                              Today's Date: 2024       Admit Date: 2024    Visit Dx:     ICD-10-CM ICD-9-CM   1. Upper abdominal pain  R10.10 789.09   2. Pneumonia of left lower lobe due to infectious organism  J18.9 486   3. Nausea and vomiting, unspecified vomiting type  R11.2 787.01   4. Esophageal stricture  K22.2 530.3   5. Esophageal dysphagia  R13.19 787.29     Patient Active Problem List   Diagnosis    Chest pain, unspecified type    Chronic pain    Hypertension    Neuropathy    Anxiety    Hypokalemia    Hypomagnesemia    Left lower lobe pneumonia    Esophageal dysphagia     Past Medical History:   Diagnosis Date    Disease of thyroid gland     Dysphagia 2023    GERD (gastroesophageal reflux disease)     Hypertension     Hypokalemia 2023    Hypomagnesemia 2023     Past Surgical History:   Procedure Laterality Date    APPENDECTOMY      CARDIAC CATHETERIZATION N/A 4/10/2023    Procedure: Left Heart Cath and coronary angiogram;  Surgeon: Matias Loyola MD;  Location: [facility] CATH INVASIVE LOCATION;  Service: Cardiovascular;  Laterality: N/A;    CARDIAC CATHETERIZATION N/A 4/10/2023    Procedure: Percutaneous Coronary Intervention;  Surgeon: Matias Loyola MD;  Location: [facility] CATH INVASIVE LOCATION;  Service: Cardiovascular;  Laterality: N/A;    ENDOSCOPY N/A 8/10/2022    Procedure: EGD WITH DILATION with 42 bougie and 12-15mm balloon to 15mm;  Surgeon: RAJAN Moctezuma MD;  Location: [facility] ENDOSCOPY;  Service: Gastroenterology;  Laterality: N/A;  esophagitis and hiatal hernia    ENDOSCOPY N/A 11/15/2022    Procedure: ESOPHAGOGASTRODUODENOSCOPY WITH DILATION ( BOUGIE 46, );  Surgeon: RAJAN Moctezuma MD;  Location: [facility] ENDOSCOPY;  Service: Gastroenterology;  Laterality: N/A;  HIATIAL HERNIA  GASTRITIS  ESOPHOGEAL STRICTURE    ENDOSCOPY N/A 2023    Procedure: ESOPHAGOGASTRODUODENOSCOPY with esophageal dilation (bougie  50, 52) and biopsy x 3 areas;  Surgeon: RAJAN Moctezuma MD;  Location: [facility] ENDOSCOPY;  Service: Gastroenterology;  Laterality: N/A;  esophagitis, hiatal hernia, esophageal stricture      ENDOSCOPY N/A 1/25/2024    Procedure: ESOPHAGOGASTRODUODENOSCOPY, non-guided balloon dilation of distal esophagus (12-14mm);  Surgeon: Rachel Easley MD;  Location: [facility] ENDOSCOPY;  Service: Gastroenterology;  Laterality: N/A;  post: gastritis, severe esophagitis, esophageal stricture      General Information       Row Name 01/26/24 1448          OT Time and Intention    Document Type evaluation  -MS     Mode of Treatment occupational therapy  -MS       Row Name 01/26/24 1448          General Information    Patient Profile Reviewed yes  -MS     Prior Level of Function independent:;ADL's;driving;all household mobility;community mobility  -MS     Existing Precautions/Restrictions fall;oxygen therapy device and L/min  currently 3.5L O2, no home O2  -MS     Barriers to Rehab none identified  -MS       Row Name 01/26/24 1448          Occupational Profile    Reason for Services/Referral (Occupational Profile) Pt is a 72 y/o M admitted to [facility] 1/25/24 with c/o abdominal pain, N/V. Hospital dx LLL PNA. PMHx significant for uncontrolled DMII, CAD, insomnia, dysphagia, and anxiety. At baseline pt resides alone in 2nd floor apartment. Pt typically (I) with ADLs, drives, and is retired. Pt reports x1 fall ~3 months ago d/t legs giving out, fell backwards. Pt utilized straight cane after, has progressed, no current AD.  -MS       Row Name 01/26/24 1448          Living Environment    People in Home alone  -MS       Row Name 01/26/24 1448          Home Main Entrance    Number of Stairs, Main Entrance other (see comments)  16 MARY  -MS       Row Name 01/26/24 1448          Stairs Within Home, Primary    Number of Stairs, Within Home, Primary none  -MS       Row Name 01/26/24 1448          Cognition    Orientation Status (Cognition) oriented x  4  -MS       Row Name 01/26/24 1448          Safety Issues, Functional Mobility    Impairments Affecting Function (Mobility) balance;endurance/activity tolerance;shortness of breath  -MS               User Key  (r) = Recorded By, (t) = Taken By, (c) = Cosigned By      Initials Name Provider Type    Zuleika Mohamud, OT Occupational Therapist                     Mobility/ADL's       Row Name 01/26/24 1449          Bed Mobility    Bed Mobility bed mobility (all) activities  -MS     All Activities, [place] (Bed Mobility) modified independence  -MS       Row Name 01/26/24 1449          Transfers    Transfers sit-stand transfer  -MS       Row Name 01/26/24 1449          Sit-Stand Transfer    Sit-Stand [place] (Transfers) modified independence  -MS               User Key  (r) = Recorded By, (t) = Taken By, (c) = Cosigned By      Initials Name Provider Type    MS Echevarria ZuleikaKEVIN Occupational Therapist                   Obj/Interventions       Row Name 01/26/24 1449          Sensory Assessment (Somatosensory)    Sensory Assessment (Somatosensory) sensation intact  -MS       Row Name 01/26/24 1449          Vision Assessment/Intervention    Visual Impairment/Limitations WFL  -MS       Row Name 01/26/24 1449          Range of Motion Comprehensive    General Range of Motion no range of motion deficits identified  -MS       Row Name 01/26/24 1449          Strength Comprehensive (MMT)    Comment, General Manual Muscle Testing (MMT) Assessment BUE grossly 4-/5  -MS       Row Name 01/26/24 1449          Balance    Balance Assessment sitting static balance;sitting dynamic balance;standing static balance;standing dynamic balance  -MS     Static Sitting Balance modified independence  -MS     Dynamic Sitting Balance modified independence  -MS     Position, Sitting Balance unsupported;sitting edge of bed  -MS     Static Standing Balance contact guard  -MS     Dynamic Standing Balance contact guard  -MS     Position/Device  Used, Standing Balance unsupported  -MS               User Key  (r) = Recorded By, (t) = Taken By, (c) = Cosigned By      Initials Name Provider Type    MS Echevarria Zuleika, OT Occupational Therapist                   Goals/Plan    No documentation.                  Clinical Impression       Row Name 01/26/24 1450          Pain Assessment    Pretreatment Pain Rating 0/10 - no pain  -MS     Posttreatment Pain Rating 0/10 - no pain  -MS       Row Name 01/26/24 1450          Plan of Care Review    Plan of Care Reviewed With patient  -MS     Progress no change  -MS     Outcome Evaluation Pt is a 74 y/o M admitted to [facility] 1/25/24 with c/o abdominal pain, N/V. Hospital dx LLL PNA. PMHx significant for uncontrolled DMII, CAD, insomnia, dysphagia, and anxiety. At baseline pt resides alone in 2nd floor apartment. Pt typically (I) with ADLs, drives, and is retired. Pt reports x1 fall ~3 months ago d/t legs giving out, fell backwards. Pt utilized straight cane after, has progressed, no current AD. This date pt A&Ox4 on 3.5L O2 and in supine upon arrival. Pt demo (I) with bed mobility and functional transfers. Pt limited to activity within room d/t lines, however does demo slight balance deficits, requiring CGA. Pt reports occassional LOB at baseline, agreeable to utilizing straight cane again at MI. Pt would benefit from OPPT to increase balance for safety, as well as 6MWT to determine O2 needs at MI. No further OT needs at this time.  -MS       Row Name 01/26/24 1450          Therapy Assessment/Plan (OT)    Criteria for Skilled Therapeutic Interventions Met (OT) no;no problems identified which require skilled intervention  -MS     Therapy Frequency (OT) evaluation only  -MS       Row Name 01/26/24 1450          Therapy Plan Review/Discharge Plan (OT)    Anticipated Discharge Disposition (OT) home;home with outpatient therapy services  -MS       Row Name 01/26/24 1450          Vital Signs    Pre Systolic BP Rehab 113  -MS     Pre  Treatment Diastolic BP 78  -MS     Pretreatment Heart Rate (beats/min) 56  -MS     Posttreatment Heart Rate (beats/min) 65  -MS     Pre SpO2 (%) 94  -MS     O2 Delivery Pre Treatment nasal cannula  3.5L  -MS     O2 Delivery Intra Treatment nasal cannula  3.5L  -MS     O2 Delivery Post Treatment nasal cannula  3.5L  -MS     Pre Patient Position Supine  -MS     Intra Patient Position Standing  -MS     Post Patient Position Supine  -MS       Row Name 01/26/24 1450          Positioning and Restraints    Pre-Treatment Position in bed  -MS     Post Treatment Position bed  -MS     In Bed notified nsg;supine;call light within reach;encouraged to call for assist  -MS               User Key  (r) = Recorded By, (t) = Taken By, (c) = Cosigned By      Initials Name Provider Type    Zuleika Mohamud, KEVIN Occupational Therapist                   Outcome Measures       Row Name 01/26/24 7079          How much help from another is currently needed...    Putting on and taking off regular lower body clothing? 4  -MS     Bathing (including washing, rinsing, and drying) 4  -MS     Toileting (which includes using toilet bed pan or urinal) 4  -MS     Putting on and taking off regular upper body clothing 4  -MS     Taking care of personal grooming (such as brushing teeth) 4  -MS     Eating meals 4  -MS     AM-PAC 6 Clicks Score (OT) 24  -MS       Row Name 01/26/24 1020 01/26/24 0800       How much help from another person do you currently need...    Turning from your back to your side while in flat bed without using bedrails? 4  -CR 4  -RM    Moving from lying on back to sitting on the side of a flat bed without bedrails? 4  -CR 4  -RM    Moving to and from a bed to a chair (including a wheelchair)? 4  -CR 4  -RM    Standing up from a chair using your arms (e.g., wheelchair, bedside chair)? 4  -CR 4  -RM    Climbing 3-5 steps with a railing? 4  -CR 4  -RM    To walk in hospital room? 4  -CR 4  -RM    AM-PAC 6 Clicks Score (PT) 24  -CR 24   -RM    Highest Level of Mobility Goal 8 --> Walked 250 feet or more  -CR 8 --> Walked 250 feet or more  -RM      Row Name 01/26/24 0405          How much help from another person do you currently need...    Turning from your back to your side while in flat bed without using bedrails? 4  -JN     Moving from lying on back to sitting on the side of a flat bed without bedrails? 4  -JN     Moving to and from a bed to a chair (including a wheelchair)? 4  -JN     Standing up from a chair using your arms (e.g., wheelchair, bedside chair)? 4  -JN     Climbing 3-5 steps with a railing? 3  -JN     To walk in hospital room? 4  -JN     AM-PAC 6 Clicks Score (PT) 23  -JN     Highest Level of Mobility Goal 7 --> Walk 25 feet or more  -JN       Row Name 01/26/24 7820          Functional Assessment    Outcome Measure Options AM-PAC 6 Clicks Daily Activity (OT)  -MS               User Key  (r) = Recorded By, (t) = Taken By, (c) = Cosigned By      Initials Name Provider Type    CR Reyes, Carmela, PT Physical Therapist    Paulina Pabon, RN Registered Nurse    Zuleika Mohamud, OT Occupational Therapist    Danny Tsai, RN Registered Nurse                    Occupational Therapy Education       Title: PT OT SLP Therapies (Done)       Topic: Occupational Therapy (Done)       Point: Precautions (Done)       Description:   Instruct learner(s) on prescribed precautions during self-care and functional transfers.                  Learning Progress Summary             Patient Acceptance, E,TB, VU by MS at 1/26/2024 1500                         Point: Body mechanics (Done)       Description:   Instruct learner(s) on proper positioning and spine alignment during self-care, functional mobility activities and/or exercises.                  Learning Progress Summary             Patient Acceptance, E,TB, VU by MS at 1/26/2024 1500                                         User Key       Initials Effective Dates Name Provider Type  Discipline    MS 07/13/22 -  Zuleika Echevarria OT Occupational Therapist OT                  OT Recommendation and Plan  Therapy Frequency (OT): evaluation only  Plan of Care Review  Plan of Care Reviewed With: patient  Progress: no change  Outcome Evaluation: Pt is a 72 y/o M admitted to [facility] 1/25/24 with c/o abdominal pain, N/V. Hospital dx LLL PNA. PMHx significant for uncontrolled DMII, CAD, insomnia, dysphagia, and anxiety. At baseline pt resides alone in 2nd floor apartment. Pt typically (I) with ADLs, drives, and is retired. Pt reports x1 fall ~3 months ago d/t legs giving out, fell backwards. Pt utilized straight cane after, has progressed, no current AD. This date pt A&Ox4 on 3.5L O2 and in supine upon arrival. Pt demo (I) with bed mobility and functional transfers. Pt limited to activity within room d/t lines, however does demo slight balance deficits, requiring CGA. Pt reports occassional LOB at baseline, agreeable to utilizing straight cane again at IN. Pt would benefit from OPPT to increase balance for safety, as well as 6MWT to determine O2 needs at IN. No further OT needs at this time.     Time Calculation:                   Zuleika Echevarria OT  1/26/2024

## 2024-01-26 NOTE — PROGRESS NOTES
[facility]     Progress Note    Patient Name: Manfred Perry  : 1950  MRN: [id number]  Primary Care Physician:  Piedad Maloney APRN  Date of admission: 2024  Service date and time: 24 16:11 EST  Subjective   Subjective     Chief Complaint: nausea and abdominal pain     HPI:  Patient is complaining of shortness of breath       Objective   Objective     Vitals:   Temp:  [98.1 °F (36.7 °C)-101.6 °F (38.7 °C)] 98.8 °F (37.1 °C)  Heart Rate:  [56-79] 60  Resp:  [11-20] 20  BP: (111-148)/(53-65) 111/55  Flow (L/min):  [2-3] 3  Physical Exam    Constitutional: Awake, alert in mild distress    Eyes: PERRLA, sclerae anicteric, no conjunctival injection   HENT: NCAT, mucous membranes moist   Neck: Supple, no thyromegaly, no lymphadenopathy, trachea midline   Respiratory: diminished breath sounds at the bases     Cardiovascular: RRR, no murmurs, rubs, or gallops, palpable pedal pulses bilaterally   Gastrointestinal: Positive bowel sounds, soft, nontender, nondistended   Musculoskeletal: No bilateral ankle edema, no clubbing or cyanosis to extremities   Psychiatric: Appropriate affect, cooperative   Neurologic: Oriented x 3, strength symmetric in all extremities, Cranial Nerves grossly intact to confrontation, speech clear   Skin: No rashes     Result Review    Result Review:  I have personally reviewed the results from the time of this admission to 2024 16:11 EST and agree with these findings:  [x]  Laboratory list / accordion  []  Microbiology  []  Radiology  []  EKG/Telemetry   []  Cardiology/Vascular   []  Pathology  []  Old records  []  Other:  Most notable findings include: Glucose 233, BUN 20, creatinine 1.05, calcium 8.1, hemoglobin 11.8, hematocrit 35.3      Assessment & Plan   Assessment / Plan       Active Hospital Problems:  Active Hospital Problems    Diagnosis     **Left lower lobe pneumonia     Esophageal dysphagia      Plan:    Abdominal pain with nausea and  "vomiting  Difficulty swallowing with hx of esophageal dilation  Large hiatal hernia seen on CT  LLL Pneumonia  -Pain improved  -Analgesics as needed  -Started on Rocephin and azithromycin for left lower lobe pneumonia  -Will consult GI for difficulty swallowing  -Bowel medications for constipation  - rocephin , azithromycin  - urine legionella and strep  - COVID and flu negative   - sat stable on room air  - prn duo neb  - IS  - GI consulted  Status post EGD with dilation of distal esophagus, erosive esophagitis, patient was started on PPI twice a day Reglan 3 times a day and Carafate 1 g TID      Uncontrolled DM   - pt reports \" I wasn't taking the metformin because didn't know what it was for\"  - started on new medication, unknown name within past couple days  - A1C  - Glucomander  - Diabetic educator consult        CAD s/p stent/ HTN/ HLD  - pending home medication review     Insomnia/ anxiety  - pending home medication review     Hypothyroid  - check TSH  - on synthroid     GERD  -PPI      Constipation   -bowel medication scheduled and PRN        Nutrition:   Diet: Liquid Diets; Clear Liquid; Fluid Consistency: Thin (IDDSI 0)      DVT prophylaxis:  Mechanical DVT prophylaxis orders are present.          DVT prophylaxis:  Mechanical DVT prophylaxis orders are present.        CODE STATUS:   Code Status (Patient has no pulse and is not breathing): CPR (Attempt to Resuscitate)  Medical Interventions (Patient has pulse or is breathing): Full Support    Disposition:  I expect patient to be discharged in 2 days .    Kurt Elizondo MD  "

## 2024-01-26 NOTE — THERAPY EVALUATION
Patient Name: Manfred Perry  : 1950    MRN: [id number]                              Today's Date: 2024       Admit Date: 2024    Visit Dx:     ICD-10-CM ICD-9-CM   1. Upper abdominal pain  R10.10 789.09   2. Pneumonia of left lower lobe due to infectious organism  J18.9 486   3. Nausea and vomiting, unspecified vomiting type  R11.2 787.01   4. Esophageal stricture  K22.2 530.3   5. Esophageal dysphagia  R13.19 787.29     Patient Active Problem List   Diagnosis    Chest pain, unspecified type    Chronic pain    Hypertension    Neuropathy    Anxiety    Hypokalemia    Hypomagnesemia    Left lower lobe pneumonia    Esophageal dysphagia     Past Medical History:   Diagnosis Date    Disease of thyroid gland     Dysphagia 2023    GERD (gastroesophageal reflux disease)     Hypertension     Hypokalemia 2023    Hypomagnesemia 2023     Past Surgical History:   Procedure Laterality Date    APPENDECTOMY      CARDIAC CATHETERIZATION N/A 4/10/2023    Procedure: Left Heart Cath and coronary angiogram;  Surgeon: Matias Loyola MD;  Location: [facility] CATH INVASIVE LOCATION;  Service: Cardiovascular;  Laterality: N/A;    CARDIAC CATHETERIZATION N/A 4/10/2023    Procedure: Percutaneous Coronary Intervention;  Surgeon: Matias Loyola MD;  Location: [facility] CATH INVASIVE LOCATION;  Service: Cardiovascular;  Laterality: N/A;    ENDOSCOPY N/A 8/10/2022    Procedure: EGD WITH DILATION with 42 bougie and 12-15mm balloon to 15mm;  Surgeon: RAJAN Moctezuma MD;  Location: [facility] ENDOSCOPY;  Service: Gastroenterology;  Laterality: N/A;  esophagitis and hiatal hernia    ENDOSCOPY N/A 11/15/2022    Procedure: ESOPHAGOGASTRODUODENOSCOPY WITH DILATION ( BOUGIE 46, );  Surgeon: RAJAN Moctezuma MD;  Location: [facility] ENDOSCOPY;  Service: Gastroenterology;  Laterality: N/A;  HIATIAL HERNIA  GASTRITIS  ESOPHOGEAL STRICTURE    ENDOSCOPY N/A 2023    Procedure: ESOPHAGOGASTRODUODENOSCOPY with esophageal dilation (bougie  50, 52) and biopsy x 3 areas;  Surgeon: RAJAN Moctezuma MD;  Location: [facility] ENDOSCOPY;  Service: Gastroenterology;  Laterality: N/A;  esophagitis, hiatal hernia, esophageal stricture        General Information       Row Name 01/26/24 1012          Physical Therapy Time and Intention    Document Type evaluation  -CR     Mode of Treatment physical therapy  -CR       Row Name 01/26/24 1012          General Information    Patient Profile Reviewed yes  -CR     Prior Level of Function independent:;all household mobility;community mobility;driving;home management  -CR     Existing Precautions/Restrictions no known precautions/restrictions  -CR       Row Name 01/26/24 1012          Living Environment    People in Home alone  -CR       Row Name 01/26/24 1012          Home Main Entrance    Number of Stairs, Main Entrance none  -CR       Row Name 01/26/24 1012          Cognition    Orientation Status (Cognition) oriented x 3  -CR       Row Name 01/26/24 1012          Safety Issues, Functional Mobility    Impairments Affecting Function (Mobility) shortness of breath  -CR               User Key  (r) = Recorded By, (t) = Taken By, (c) = Cosigned By      Initials Name Provider Type    CR Reyes, Carmela, PT Physical Therapist                   Mobility       Row Name 01/26/24 1014          Bed Mobility    Bed Mobility bed mobility (all) activities  -CR     All Activities, [place]itchie (Bed Mobility) independent  -CR       Row Name 01/26/24 1014          Sit-Stand Transfer    Sit-Stand [place]itchie (Transfers) independent  -CR       Row Name 01/26/24 1014          Gait/Stairs (Locomotion)    Comment, (Gait/Stairs) IV attached to bed and no available pole  -CR               User Key  (r) = Recorded By, (t) = Taken By, (c) = Cosigned By      Initials Name Provider Type    CR Reyes, Carmela, PT Physical Therapist                   Obj/Interventions       Row Name 01/26/24 1014          Range of Motion Comprehensive    General Range  of Motion no range of motion deficits identified  -CR       Row Name 01/26/24 1014          Strength Comprehensive (MMT)    General Manual Muscle Testing (MMT) Assessment no strength deficits identified  -CR       Row Name 01/26/24 1014          Balance    Balance Assessment sitting static balance;sitting dynamic balance;sit to stand dynamic balance;standing dynamic balance;standing static balance  -CR     Static Sitting Balance independent  -CR     Dynamic Sitting Balance independent  -CR     Position, Sitting Balance sitting edge of bed  -CR     Sit to Stand Dynamic Balance independent  -CR     Static Standing Balance independent  -CR     Dynamic Standing Balance independent  -CR       Row Name 01/26/24 1014          Sensory Assessment (Somatosensory)    Sensory Assessment (Somatosensory) sensation intact  -CR               User Key  (r) = Recorded By, (t) = Taken By, (c) = Cosigned By      Initials Name Provider Type    CR Reyes, Carmela, PT Physical Therapist                   Goals/Plan    No documentation.                  Clinical Impression       Row Name 01/26/24 1015          Pain    Pretreatment Pain Rating 0/10 - no pain  -CR     Posttreatment Pain Rating 0/10 - no pain  -CR       Row Name 01/26/24 1015          Plan of Care Review    Plan of Care Reviewed With patient  -CR     Outcome Evaluation 72 y/o male admitted on 1/25 due to abd pain, nausea/vomiting and found with pna. EGD on 1/25 showed esophagitis and hiatal hernia. PMH Includes DM, anxiety, CAD. Pt lives alone and normally independent , not on supplemental O2. At time of eval, patien ton 2l/nc and noted desat to 88% on room air with exertion. No SOA /dizziness reported. Patient is independent with bed mobility and transfers. Good standing dynamic balance observed however was unable to ambulate patient due to no available IV pole and RN not available. BLE mm strength WNL ,patient appears to be near or at his baseline. May need 6MWT to determine  supplemental O2 needs at dc.  -CR       Row Name 01/26/24 1015          Therapy Assessment/Plan (PT)    Patient/Family Therapy Goals Statement (PT) home  -CR     Criteria for Skilled Interventions Met (PT) no;does not meet criteria for skilled intervention  -CR     Therapy Frequency (PT) evaluation only  -CR     Predicted Duration of Therapy Intervention (PT) dc  -CR               User Key  (r) = Recorded By, (t) = Taken By, (c) = Cosigned By      Initials Name Provider Type    CR Reyes, Carmela, PT Physical Therapist                   Outcome Measures       Row Name 01/26/24 1020 01/26/24 0405       How much help from another person do you currently need...    Turning from your back to your side while in flat bed without using bedrails? 4  -CR 4  -JN    Moving from lying on back to sitting on the side of a flat bed without bedrails? 4  -CR 4  -JN    Moving to and from a bed to a chair (including a wheelchair)? 4  -CR 4  -JN    Standing up from a chair using your arms (e.g., wheelchair, bedside chair)? 4  -CR 4  -JN    Climbing 3-5 steps with a railing? 4  -CR 3  -JN    To walk in hospital room? 4  -CR 4  -JN    AM-PAC 6 Clicks Score (PT) 24  -CR 23  -JN    Highest Level of Mobility Goal 8 --> Walked 250 feet or more  -CR 7 --> Walk 25 feet or more  -JN              User Key  (r) = Recorded By, (t) = Taken By, (c) = Cosigned By      Initials Name Provider Type    CR Reyes, Carmela, PT Physical Therapist    Paulina Pabon RN Registered Nurse                                 Physical Therapy Education       Title: PT OT SLP Therapies (Done)       Topic: Physical Therapy (Done)       Point: Mobility training (Done)       Learning Progress Summary             Patient Acceptance, E, VU by CR at 1/26/2024 1020                                         User Key       Initials Effective Dates Name Provider Type [facility]    CR 06/16/21 -  Reyes, Carmela, PT Physical Therapist PT                  PT Recommendation and  Plan     Plan of Care Reviewed With: patient  Outcome Evaluation: 74 y/o male admitted on 1/25 due to abd pain, nausea/vomiting and found with pna. EGD on 1/25 showed esophagitis and hiatal hernia. PMH Includes DM, anxiety, CAD. Pt lives alone and normally independent , not on supplemental O2. At time of eval, patien ton 2l/nc and noted desat to 88% on room air with exertion. No SOA /dizziness reported. Patient is independent with bed mobility and transfers. Good standing dynamic balance observed however was unable to ambulate patient due to no available IV pole and RN not available. BLE mm strength WNL ,patient appears to be near or at his baseline. May need 6MWT to determine supplemental O2 needs at dc.     Time Calculation:         PT Charges       Row Name 01/26/24 1021             Time Calculation    Start Time 0915  -CR      Stop Time 0932  -CR      Time Calculation (min) 17 min  -CR      PT Received On 01/26/24  -CR         Time Calculation- PT    Total Timed Code Minutes- PT 0 minute(s)  -CR                User Key  (r) = Recorded By, (t) = Taken By, (c) = Cosigned By      Initials Name Provider Type    CR Reyes, Carmela, PT Physical Therapist                  Therapy Charges for Today       Code Description Service Date Service Provider Modifiers Qty    13875673836 HC PT EVAL LOW COMPLEXITY 3 1/26/2024 Reyes, Carmela, PT GP 1            PT G-Codes  AM-PAC 6 Clicks Score (PT): 24  PT Discharge Summary  Anticipated Discharge Disposition (PT): home    Carmela Reyes, PT  1/26/2024

## 2024-01-27 VITALS
DIASTOLIC BLOOD PRESSURE: 52 MMHG | RESPIRATION RATE: 18 BRPM | OXYGEN SATURATION: 93 % | SYSTOLIC BLOOD PRESSURE: 112 MMHG | TEMPERATURE: 99.1 F | HEIGHT: 69 IN | BODY MASS INDEX: 36.29 KG/M2 | WEIGHT: 245 LBS | HEART RATE: 68 BPM

## 2024-01-27 LAB
ANION GAP SERPL CALCULATED.3IONS-SCNC: 9 MMOL/L (ref 5–15)
BUN SERPL-MCNC: 21 MG/DL (ref 8–23)
BUN/CREAT SERPL: 19.3 (ref 7–25)
CALCIUM SPEC-SCNC: 8.4 MG/DL (ref 8.6–10.5)
CHLORIDE SERPL-SCNC: 99 MMOL/L (ref 98–107)
CO2 SERPL-SCNC: 27 MMOL/L (ref 22–29)
CREAT SERPL-MCNC: 1.09 MG/DL (ref 0.76–1.27)
DEPRECATED RDW RBC AUTO: 49.9 FL (ref 37–54)
EGFRCR SERPLBLD CKD-EPI 2021: 71.2 ML/MIN/1.73
ERYTHROCYTE [DISTWIDTH] IN BLOOD BY AUTOMATED COUNT: 14.4 % (ref 12.3–15.4)
GLUCOSE BLDC GLUCOMTR-MCNC: 171 MG/DL (ref 70–105)
GLUCOSE BLDC GLUCOMTR-MCNC: 209 MG/DL (ref 70–105)
GLUCOSE BLDC GLUCOMTR-MCNC: 212 MG/DL (ref 70–105)
GLUCOSE SERPL-MCNC: 165 MG/DL (ref 65–99)
HCT VFR BLD AUTO: 38.3 % (ref 37.5–51)
HGB BLD-MCNC: 12.2 G/DL (ref 13–17.7)
MCH RBC QN AUTO: 29.7 PG (ref 26.6–33)
MCHC RBC AUTO-ENTMCNC: 31.9 G/DL (ref 31.5–35.7)
MCV RBC AUTO: 93.2 FL (ref 79–97)
PLATELET # BLD AUTO: 178 10*3/MM3 (ref 140–450)
PMV BLD AUTO: 9 FL (ref 6–12)
POTASSIUM SERPL-SCNC: 4.1 MMOL/L (ref 3.5–5.2)
RBC # BLD AUTO: 4.11 10*6/MM3 (ref 4.14–5.8)
SODIUM SERPL-SCNC: 135 MMOL/L (ref 136–145)
WBC NRBC COR # BLD AUTO: 10.5 10*3/MM3 (ref 3.4–10.8)

## 2024-01-27 PROCEDURE — 63710000001 INSULIN LISPRO (HUMAN) PER 5 UNITS: Performed by: NURSE PRACTITIONER

## 2024-01-27 PROCEDURE — 85027 COMPLETE CBC AUTOMATED: CPT | Performed by: INTERNAL MEDICINE

## 2024-01-27 PROCEDURE — 25010000002 AZITHROMYCIN PER 500 MG: Performed by: NURSE PRACTITIONER

## 2024-01-27 PROCEDURE — 25010000002 CEFTRIAXONE PER 250 MG: Performed by: NURSE PRACTITIONER

## 2024-01-27 PROCEDURE — 25810000003 SODIUM CHLORIDE 0.9 % SOLUTION 250 ML FLEX CONT: Performed by: NURSE PRACTITIONER

## 2024-01-27 PROCEDURE — 82948 REAGENT STRIP/BLOOD GLUCOSE: CPT | Performed by: NURSE PRACTITIONER

## 2024-01-27 PROCEDURE — 82948 REAGENT STRIP/BLOOD GLUCOSE: CPT

## 2024-01-27 PROCEDURE — 94618 PULMONARY STRESS TESTING: CPT

## 2024-01-27 PROCEDURE — 80048 BASIC METABOLIC PNL TOTAL CA: CPT | Performed by: INTERNAL MEDICINE

## 2024-01-27 RX ORDER — SUCRALFATE 1 G/1
1 TABLET ORAL
Qty: 120 TABLET | Refills: 0 | Status: SHIPPED | OUTPATIENT
Start: 2024-01-27 | End: 2024-02-26

## 2024-01-27 RX ORDER — PANTOPRAZOLE SODIUM 40 MG/1
40 TABLET, DELAYED RELEASE ORAL
Qty: 60 TABLET | Refills: 0 | Status: SHIPPED | OUTPATIENT
Start: 2024-01-27 | End: 2024-02-26

## 2024-01-27 RX ORDER — BLOOD-GLUCOSE METER
1 EACH MISCELLANEOUS
Qty: 1 KIT | Refills: 0 | Status: SHIPPED | OUTPATIENT
Start: 2024-01-27

## 2024-01-27 RX ORDER — BLOOD SUGAR DIAGNOSTIC
1 STRIP MISCELLANEOUS
Qty: 100 EACH | Refills: 2 | Status: SHIPPED | OUTPATIENT
Start: 2024-01-27

## 2024-01-27 RX ORDER — LANCETS
1 EACH MISCELLANEOUS
Qty: 100 EACH | Refills: 2 | Status: SHIPPED | OUTPATIENT
Start: 2024-01-27

## 2024-01-27 RX ORDER — METOCLOPRAMIDE 5 MG/1
5 TABLET ORAL
Qty: 90 TABLET | Refills: 0 | Status: SHIPPED | OUTPATIENT
Start: 2024-01-27 | End: 2024-02-26

## 2024-01-27 RX ORDER — PEN NEEDLE, DIABETIC 30 GX3/16"
1 NEEDLE, DISPOSABLE MISCELLANEOUS
Qty: 200 EACH | Refills: 2 | Status: SHIPPED | OUTPATIENT
Start: 2024-01-27

## 2024-01-27 RX ORDER — AZITHROMYCIN 500 MG/1
500 TABLET, FILM COATED ORAL DAILY
Qty: 5 TABLET | Refills: 0 | Status: SHIPPED | OUTPATIENT
Start: 2024-01-27 | End: 2024-02-01

## 2024-01-27 RX ORDER — INSULIN LISPRO 100 [IU]/ML
9 INJECTION, SOLUTION INTRAVENOUS; SUBCUTANEOUS
Qty: 15 ML | Refills: 2 | Status: SHIPPED | OUTPATIENT
Start: 2024-01-27

## 2024-01-27 RX ORDER — AMOXICILLIN AND CLAVULANATE POTASSIUM 875; 125 MG/1; MG/1
1 TABLET, FILM COATED ORAL 2 TIMES DAILY
Qty: 10 TABLET | Refills: 0 | Status: SHIPPED | OUTPATIENT
Start: 2024-01-27 | End: 2024-02-01

## 2024-01-27 RX ADMIN — Medication 10 ML: at 08:24

## 2024-01-27 RX ADMIN — LOSARTAN POTASSIUM 100 MG: 50 TABLET, FILM COATED ORAL at 08:14

## 2024-01-27 RX ADMIN — HYDROXYZINE HYDROCHLORIDE 25 MG: 25 TABLET, FILM COATED ORAL at 08:14

## 2024-01-27 RX ADMIN — INSULIN LISPRO 11 UNITS: 100 INJECTION, SOLUTION INTRAVENOUS; SUBCUTANEOUS at 08:13

## 2024-01-27 RX ADMIN — ASPIRIN 81 MG CHEWABLE TABLET 81 MG: 81 TABLET CHEWABLE at 08:14

## 2024-01-27 RX ADMIN — PANTOPRAZOLE SODIUM 40 MG: 40 TABLET, DELAYED RELEASE ORAL at 08:14

## 2024-01-27 RX ADMIN — INSULIN LISPRO 13 UNITS: 100 INJECTION, SOLUTION INTRAVENOUS; SUBCUTANEOUS at 12:11

## 2024-01-27 RX ADMIN — LEVOTHYROXINE SODIUM 50 MCG: 0.05 TABLET ORAL at 04:50

## 2024-01-27 RX ADMIN — AZITHROMYCIN MONOHYDRATE 500 MG: 500 INJECTION, POWDER, LYOPHILIZED, FOR SOLUTION INTRAVENOUS at 04:50

## 2024-01-27 RX ADMIN — POLYETHYLENE GLYCOL 3350 17 G: 17 POWDER, FOR SOLUTION ORAL at 08:14

## 2024-01-27 RX ADMIN — SUCRALFATE 1 G: 1 TABLET ORAL at 08:14

## 2024-01-27 RX ADMIN — METOPROLOL TARTRATE 25 MG: 25 TABLET, FILM COATED ORAL at 08:14

## 2024-01-27 RX ADMIN — CEFTRIAXONE 2000 MG: 2 INJECTION, POWDER, FOR SOLUTION INTRAMUSCULAR; INTRAVENOUS at 04:12

## 2024-01-27 RX ADMIN — SUCRALFATE 1 G: 1 TABLET ORAL at 12:11

## 2024-01-27 RX ADMIN — METOCLOPRAMIDE 5 MG: 5 TABLET ORAL at 08:14

## 2024-01-27 RX ADMIN — CLOPIDOGREL BISULFATE 75 MG: 75 TABLET ORAL at 08:14

## 2024-01-27 RX ADMIN — Medication 400 MG: at 08:14

## 2024-01-27 RX ADMIN — BUPRENORPHINE HYDROCHLORIDE AND NALOXONE HYDROCHLORIDE DIHYDRATE 1 TABLET: 8; 2 TABLET SUBLINGUAL at 08:14

## 2024-01-27 RX ADMIN — ACETAMINOPHEN 650 MG: 325 TABLET, FILM COATED ORAL at 00:51

## 2024-01-27 RX ADMIN — ACETAMINOPHEN 650 MG: 325 TABLET, FILM COATED ORAL at 08:13

## 2024-01-27 RX ADMIN — DOCUSATE SODIUM 50MG AND SENNOSIDES 8.6MG 2 TABLET: 8.6; 5 TABLET, FILM COATED ORAL at 08:14

## 2024-01-27 RX ADMIN — METOCLOPRAMIDE 5 MG: 5 TABLET ORAL at 12:11

## 2024-01-27 NOTE — DISCHARGE SUMMARY
[facility] Medicine Services  Discharge Summary      Date of Admission: 1/25/2024  Date of Discharge:  1/27/2024  Primary Care Physician: Piedad Maloney APRN    Presenting Problem/History of Present Illness:  Esophageal dysphagia [R13.19]  Esophageal stricture [K22.2]  Left lower lobe pneumonia [J18.9]  Upper abdominal pain [R10.10]  Pneumonia of left lower lobe due to infectious organism [J18.9]  Nausea and vomiting, unspecified vomiting type [R11.2]       Final Discharge Diagnoses:  Active Hospital Problems    Diagnosis     **Left lower lobe pneumonia     Esophageal dysphagia        Consults:   Consults       Date and Time Order Name Status Description    1/25/2024  5:51 AM Inpatient Gastroenterology Consult Completed     1/25/2024  5:05 AM Hospitalist (on-call MD unless specified)              Procedures Performed: Procedure(s):  ESOPHAGOGASTRODUODENOSCOPY, non-guided balloon dilation of distal esophagus (12-14mm)           01/27 1111 Note By: Holly Mann, CRT  01/25 1333 UPPER GI ENDOSCOPY    Pertinent Test Results:   Lab Results (most recent)       Procedure Component Value Units Date/Time    POC Glucose 4x Daily Before Meals & at Bedtime [[id number]]  (Abnormal) Collected: 01/27/24 1132    Specimen: Blood Updated: 01/27/24 1134     Glucose 212 mg/dL      Comment: Serial Number: [id number]:  [id number]       POC Glucose 4x Daily Before Meals & at Bedtime [[id number]]  (Abnormal) Collected: 01/27/24 0713    Specimen: Blood Updated: 01/27/24 0714     Glucose 209 mg/dL      Comment: Serial Number: [id number]:  [id number]       Blood Culture - Blood, Arm, Right [[id number]]  (Normal) Collected: 01/25/24 0534    Specimen: Blood from Arm, Right Updated: 01/27/24 0545     Blood Culture No growth at 2 days    Narrative:      Less than seven (7) mL's of blood was collected.  Insufficient quantity may yield false negative results.    Blood Culture - Blood, Arm, Left [[id number]]   (Normal) Collected: 01/25/24 0534    Specimen: Blood from Arm, Left Updated: 01/27/24 0545     Blood Culture No growth at 2 days    Basic Metabolic Panel [[id number]]  (Abnormal) Collected: 01/27/24 0105    Specimen: Blood Updated: 01/27/24 0223     Glucose 165 mg/dL      BUN 21 mg/dL      Creatinine 1.09 mg/dL      Sodium 135 mmol/L      Potassium 4.1 mmol/L      Chloride 99 mmol/L      CO2 27.0 mmol/L      Calcium 8.4 mg/dL      BUN/Creatinine Ratio 19.3     Anion Gap 9.0 mmol/L      eGFR 71.2 mL/min/1.73     Narrative:      GFR Normal >60  Chronic Kidney Disease <60  Kidney Failure <15    The GFR formula is only valid for adults with stable renal function between ages 18 and 70.    CBC (No Diff) [[id number]]  (Abnormal) Collected: 01/27/24 0105    Specimen: Blood Updated: 01/27/24 0210     WBC 10.50 10*3/mm3      RBC 4.11 10*6/mm3      Hemoglobin 12.2 g/dL      Hematocrit 38.3 %      MCV 93.2 fL      MCH 29.7 pg      MCHC 31.9 g/dL      RDW 14.4 %      RDW-SD 49.9 fl      MPV 9.0 fL      Platelets 178 10*3/mm3     Tissue Pathology Exam [[id number]] Collected: 01/25/24 1344    Specimen: Tissue from Stomach Updated: 01/26/24 1144    Legionella Antigen, Urine - Urine, Urine, Clean Catch [[id number]]  (Normal) Collected: 01/26/24 0445    Specimen: Urine, Clean Catch Updated: 01/26/24 0529     LEGIONELLA ANTIGEN, URINE Negative    S. Pneumo Ag Urine or CSF - Urine, Urine, Clean Catch [[id number]]  (Normal) Collected: 01/26/24 0445    Specimen: Urine, Clean Catch Updated: 01/26/24 0529     Strep Pneumo Ag Negative    Comprehensive Metabolic Panel [[id number]]  (Abnormal) Collected: 01/25/24 2327    Specimen: Blood from Hand, Right Updated: 01/26/24 0045     Glucose 233 mg/dL      BUN 20 mg/dL      Creatinine 1.05 mg/dL      Sodium 136 mmol/L      Potassium 4.0 mmol/L      Chloride 100 mmol/L      CO2 28.0 mmol/L      Calcium 8.1 mg/dL      Total Protein 7.0 g/dL      Albumin 3.5 g/dL      ALT (SGPT) 18 U/L      AST (SGOT) 36  U/L      Alkaline Phosphatase 84 U/L      Total Bilirubin 0.4 mg/dL      Globulin 3.5 gm/dL      A/G Ratio 1.0 g/dL      BUN/Creatinine Ratio 19.0     Anion Gap 8.0 mmol/L      eGFR 74.5 mL/min/1.73     Narrative:      GFR Normal >60  Chronic Kidney Disease <60  Kidney Failure <15    The GFR formula is only valid for adults with stable renal function between ages 18 and 70.    CBC & Differential [[id number]]  (Abnormal) Collected: 01/25/24 2327    Specimen: Blood from Hand, Right Updated: 01/26/24 0019    Narrative:      The following orders were created for panel order CBC & Differential.  Procedure                               Abnormality         Status                     ---------                               -----------         ------                     CBC Auto Differential[[id number]]        Abnormal            Final result                 Please view results for these tests on the individual orders.    CBC Auto Differential [[id number]]  (Abnormal) Collected: 01/25/24 2327    Specimen: Blood from Hand, Right Updated: 01/26/24 0019     WBC 9.00 10*3/mm3      RBC 3.91 10*6/mm3      Hemoglobin 11.8 g/dL      Hematocrit 35.3 %      MCV 90.3 fL      MCH 30.1 pg      MCHC 33.3 g/dL      RDW 13.9 %      RDW-SD 45.9 fl      MPV 9.2 fL      Platelets 174 10*3/mm3      Neutrophil % 82.8 %      Lymphocyte % 9.4 %      Monocyte % 7.3 %      Eosinophil % 0.3 %      Basophil % 0.2 %      Neutrophils, Absolute 7.50 10*3/mm3      Lymphocytes, Absolute 0.90 10*3/mm3      Monocytes, Absolute 0.70 10*3/mm3      Eosinophils, Absolute 0.00 10*3/mm3      Basophils, Absolute 0.00 10*3/mm3      nRBC 0.2 /100 WBC     TSH [[id number]]  (Abnormal) Collected: 01/25/24 0244    Specimen: Blood from Arm, Right Updated: 01/25/24 0638     TSH 8.970 uIU/mL     Hemoglobin A1c [[id number]]  (Abnormal) Collected: 01/25/24 0219    Specimen: Blood Updated: 01/25/24 0556     Hemoglobin A1C 11.40 %     POC Lactate [[id number]]  (Normal) Collected:  01/25/24 0545    Specimen: Blood Updated: 01/25/24 0547     Lactate 1.4 mmol/L      Comment: Serial Number: [id number]:  [id number]       Single High Sensitivity Troponin T [[id number]]  (Normal) Collected: 01/25/24 0244    Specimen: Blood from Arm, Right Updated: 01/25/24 0435     HS Troponin T 9 ng/L     Narrative:      High Sensitive Troponin T Reference Range:  <14.0 ng/L- Negative Female for AMI  <22.0 ng/L- Negative Male for AMI  >=14 - Abnormal Female indicating possible myocardial injury.  >=22 - Abnormal Male indicating possible myocardial injury.   Clinicians would have to utilize clinical acumen, EKG, Troponin, and serial changes to determine if it is an Acute Myocardial Infarction or myocardial injury due to an underlying chronic condition.         COVID-19 and FLU A/B PCR, 1 HR TAT - Swab, Nasopharynx [[id number]]  (Normal) Collected: 01/25/24 0345    Specimen: Swab from Nasopharynx Updated: 01/25/24 0411     COVID19 Not Detected     Influenza A PCR Not Detected     Influenza B PCR Not Detected    Narrative:      Fact sheet for providers: https://www.fda.gov/media/535565/download    Fact sheet for patients: https://www.fda.gov/media/807221/download    Test performed by PCR.    Comprehensive Metabolic Panel [[id number]]  (Abnormal) Collected: 01/25/24 0244    Specimen: Blood from Arm, Right Updated: 01/25/24 0314     Glucose 423 mg/dL      BUN 23 mg/dL      Creatinine 1.12 mg/dL      Sodium 131 mmol/L      Potassium 4.5 mmol/L      Chloride 94 mmol/L      CO2 25.0 mmol/L      Calcium 8.6 mg/dL      Total Protein 7.8 g/dL      Albumin 4.0 g/dL      ALT (SGPT) 21 U/L      AST (SGOT) 35 U/L      Alkaline Phosphatase 115 U/L      Total Bilirubin 1.1 mg/dL      Globulin 3.8 gm/dL      A/G Ratio 1.1 g/dL      BUN/Creatinine Ratio 20.5     Anion Gap 12.0 mmol/L      eGFR 68.9 mL/min/1.73     Narrative:      GFR Normal >60  Chronic Kidney Disease <60  Kidney Failure <15    The GFR formula is only valid for  adults with stable renal function between ages 18 and 70.    Lipase [[id number]]  (Normal) Collected: 01/25/24 0244    Specimen: Blood from Arm, Right Updated: 01/25/24 0309     Lipase 24 U/L     CBC & Differential [[id number]]  (Abnormal) Collected: 01/25/24 0219    Specimen: Blood Updated: 01/25/24 0230    Narrative:      The following orders were created for panel order CBC & Differential.  Procedure                               Abnormality         Status                     ---------                               -----------         ------                     CBC Auto Differential[[id number]]        Abnormal            Final result                 Please view results for these tests on the individual orders.    CBC Auto Differential [[id number]]  (Abnormal) Collected: 01/25/24 0219    Specimen: Blood Updated: 01/25/24 0230     WBC 12.10 10*3/mm3      RBC 4.46 10*6/mm3      Hemoglobin 13.3 g/dL      Hematocrit 39.7 %      MCV 88.9 fL      MCH 29.8 pg      MCHC 33.5 g/dL      RDW 13.8 %      RDW-SD 45.1 fl      MPV 9.3 fL      Platelets 228 10*3/mm3      Neutrophil % 95.2 %      Lymphocyte % 1.9 %      Monocyte % 2.4 %      Eosinophil % 0.0 %      Basophil % 0.5 %      Neutrophils, Absolute 11.50 10*3/mm3      Lymphocytes, Absolute 0.20 10*3/mm3      Monocytes, Absolute 0.30 10*3/mm3      Eosinophils, Absolute 0.00 10*3/mm3      Basophils, Absolute 0.10 10*3/mm3      nRBC 0.0 /100 WBC           Imaging Results (Most Recent)       Procedure Component Value Units Date/Time    CT Abdomen Pelvis With Contrast [[id number]] Collected: 01/25/24 0443     Updated: 01/25/24 0457    Narrative:      CT ABDOMEN PELVIS W CONTRAST    Date of Exam: 1/25/2024 4:29 AM EST    Indication: pain.    Comparison: 5/21/2022.    Technique: Axial CT images were obtained of the abdomen and pelvis following the uneventful intravenous administration of iodinated contrast. Sagittal and coronal reconstructions were performed.  Automated exposure  control and iterative reconstruction   methods were used.        Findings:  Previously seen 5 mm right lower lobe nodule is semisolid and barely perceptible on today's exam. There is patchy alveolar disease in the left lower lobe concerning for pneumonia. There is a small stomach and fat-containing hiatal hernia. The heart size   is normal. There is fluid in the distal esophagus. No acute findings in the superficial soft tissues. There are no acute osseous abnormalities or destructive bone lesions. There are moderate thoracolumbar degenerative changes.    The liver, gallbladder, bile ducts, pancreas, mid and distal stomach and spleen appear unremarkable. The adrenal glands appear normal. There is an exophytic hemorrhagic or proteinaceous cyst at the superior right kidney measuring 18 mm. There is a small   fatty nodule at the upper pole the right kidney, which may represent fatty interdigitation or a small angiomyolipoma. The left kidney is normal. No urolithiasis or hydronephrosis. The prostate gland and urinary bladder appear within normal limits.   Patient is status post appendectomy. There is mild colonic fecal retention. Small bowel is nondistended. There is mild stable haziness in the mesentery without adenopathy. There is minimal aortoiliac atherosclerosis. No ascites, pneumoperitoneum or   lymphadenopathy.      Impression:      Impression:  1.Left lower lobe pneumonia.  2.No acute abdominal or pelvic abnormality.  3.Mild colonic fecal retention.  4.Small stomach and fat-containing hiatal hernia.  5.Previously seen 5 mm right lower lobe nodule is semisolid and barely perceptible on today's exam.        Electronically Signed: Yfn Crow MD    1/25/2024 4:55 AM EST    Workstation ID: [id number]            Chief Complaint on Day of Discharge: pneumonia    Hospital Course:  Manfred Perry is a 73 y.o. male with PMH of hypothyroidism, urinary artery disease status post stent, hyperlipidemia GERD, hypertension and   "diabetes presented to the hospital on 1/25/2024, and was admitted with a principal diagnosis of Left lower lobe pneumonia.      Patient presents to the ED this evening feeling unwell since about 3 PM.  Had nausea upper abdominal pain.  Vomited 3-4 times.  Noted some difficulty with swallowing and spitting up food for the last month.  Seenprimary care on Wednesday and was found to have elevated blood sugar.  It was noted patient was started on metformin back in December.  Patient did not disclose to PCP difficulty swallowing.  He has had esophageal dilation in the past.     CT of the abdomen pelvis was positive for left lower lobe pneumonia.  No acute abdominal or pelvic abnormality.  Positive for signs of constipation with mild colonic fecal retention.  Small stomach and fat containing hiatal hernia.  Previously seen 5 mm right lower lobe nodule is semisolid and barely perceivable on today's exam.  Sodium 131 chloride 94 glucose 423 lipase 24 white blood cell 12.1.  Patient has been started on Rocephin and azithromycin.  Will consult diabetes educator.  He has been admitted for further treatment    The patient was treated for pneumonia with azithromycin and ceftriaxone.  His cultures came back negative, but due to his symptoms, he was continued on abx on discharge.  He was evaluated by GI for his nausea, vomiting and abdominal pain and had an EGD with findings of erosive esophagitis.  He also had a distal esophageal dilation during his EGD.  He was started on reglan, protonix bid and carafate tid by GI.  He was deemed stable for d/c home on 1/27.  He is instructed to f/u with his PCP in 1 week and GI in 4 weeks.    Condition on Discharge:  stable    Physical Exam on Discharge:  /52 (BP Location: Left arm, Patient Position: Lying)   Pulse 68   Temp 99.1 °F (37.3 °C) (Oral)   Resp 18   Ht 175.3 cm (69\")   Wt 111 kg (245 lb)   SpO2 93%   BMI 36.18 kg/m²   Physical Exam  General: NAD, AAOx3  HEENT: AT NC, " EOMI  Neck: No JVD  CVS: S1/S2 present, RRR, no M/R/G  Lungs: CTA B/L  Abdomen: soft, NT, ND, BS+  Ext: no edema  Skin: no rashes, bruises or discolorations  Neuro: no focal deficits  Psych: Not agitated     Discharge Disposition:  Home or Self Care    Discharge Medications:     Discharge Medications        New Medications        Instructions Start Date   Accu-Chek Guide Me w/Device kit   1 kit, Does not apply, 3 Times Daily Before Meals, Dx code: E11.65  NDC [address]-73  Bin#: [id number] Group #: [id number]  ID #: [id number]  Issuer #: [id number])      Accu-Chek Guide test strip  Generic drug: glucose blood   1 each, Other, 3 Times Daily Before Meals, Use as instructed Dx code: E11.65      Accu-Chek Softclix Lancets lancets   1 each, Other, 3 Times Daily Before Meals, Use as instructed Dx code: E11.65      amoxicillin-clavulanate 875-125 MG per tablet  Commonly known as: AUGMENTIN   1 tablet, Oral, 2 Times Daily      azithromycin 500 MG tablet  Commonly known as: Zithromax   500 mg, Oral, Daily      Insulin Glargine 100 UNIT/ML injection pen  Commonly known as: LANTUS SOLOSTAR   28 Units, Subcutaneous, Nightly, Dx code: E11.65      Insulin Lispro (1 Unit Dial) 100 UNIT/ML solution pen-injector  Commonly known as: HumaLOG KwikPen   9 Units, Subcutaneous, 3 Times Daily Before Meals, Dx code: E11.65      metoclopramide 5 MG tablet  Commonly known as: REGLAN   5 mg, Oral, 3 Times Daily Before Meals      pantoprazole 40 MG EC tablet  Commonly known as: PROTONIX   40 mg, Oral, 2 Times Daily Before Meals      Pen Needles 32G X 4 MM misc   1 each, Does not apply, 4 Times Daily Before Meals & Nightly, Dx code: E11.65      sucralfate 1 g tablet  Commonly known as: CARAFATE   1 g, Oral, 4 Times Daily Before Meals & Nightly             Continue These Medications        Instructions Start Date   aspirin 81 MG chewable tablet   81 mg, Oral, Daily      atorvastatin 40 MG tablet  Commonly known as: LIPITOR   40 mg, Oral, Nightly       buprenorphine-naloxone 8-2 MG per SL tablet  Commonly known as: SUBOXONE   1 tablet, Sublingual, 2 Times Daily      clopidogrel 75 MG tablet  Commonly known as: PLAVIX   75 mg, Oral, Daily      Daridorexant HCl 50 MG tablet  Commonly known as: QUVIVIQ   50 mg, Oral, Daily      hydroCHLOROthiazide 25 MG tablet  Commonly known as: HYDRODIURIL   25 mg, Oral, Daily      hydrOXYzine 25 MG tablet  Commonly known as: ATARAX   1-2 tablets, Oral, Daily      levothyroxine 50 MCG tablet  Commonly known as: Synthroid   50 mcg, Oral, Daily      losartan 100 MG tablet  Commonly known as: COZAAR   100 mg, Oral, Daily      magnesium oxide 400 MG tablet  Commonly known as: MAG-OX   400 mg, Oral, Daily      metFORMIN 500 MG tablet  Commonly known as: GLUCOPHAGE   500 mg, Oral, 2 Times Daily With Meals      metoprolol tartrate 25 MG tablet  Commonly known as: LOPRESSOR   25 mg, Oral, Every 12 Hours Scheduled      QUEtiapine 100 MG tablet  Commonly known as: SEROquel   1-2 tablets, Oral, Nightly               Discharge Diet:  diabetic    Activity at Discharge:  as tolerated    Discharge Care Plan/Instructions: f/u PCP, GI    Time: less than 30 minutes

## 2024-01-27 NOTE — PROGRESS NOTES
"[facility] MEDICINE SERVICE  DAILY PROGRESS NOTE      Patient Name: Manfred Perry  Date of Admission: 1/25/2024  Today's Date: 01/27/24  Length of Stay: 1  Primary Care Physician: Piedad Maloney APRN    Subjective   Patient is doing well at this time.  He has no current complaints.  No overnight events.      Objective    Temp:  [98.3 °F (36.8 °C)-99.3 °F (37.4 °C)] 99.3 °F (37.4 °C)  Heart Rate:  [55-71] 71  Resp:  [15-20] 16  BP: (107-147)/(55-97) 125/97  Physical Exam  General: NAD, AAOx3  HEENT: AT NC, EOMI  Neck: No JVD  CVS: S1/S2 present, RRR, no M/R/G  Lungs: CTA B/L  Abdomen: soft, NT, ND, BS+  Ext: no edema  Skin: no rashes, bruises or discolorations  Neuro: no focal deficits  Psych: Not agitated       Results Review:  I have reviewed the labs, radiology results, and diagnostic studies.    Laboratory Data:   Results from last 7 days   Lab Units 01/27/24  0105 01/25/24 2327 01/25/24  0219   WBC 10*3/mm3 10.50 9.00 12.10*   HEMOGLOBIN g/dL 12.2* 11.8* 13.3   HEMATOCRIT % 38.3 35.3* 39.7   PLATELETS 10*3/mm3 178 174 228        Results from last 7 days   Lab Units 01/27/24  0105 01/25/24 2327 01/25/24  0244   SODIUM mmol/L 135* 136 131*   POTASSIUM mmol/L 4.1 4.0 4.5   CHLORIDE mmol/L 99 100 94*   CO2 mmol/L 27.0 28.0 25.0   BUN mg/dL 21 20 23   CREATININE mg/dL 1.09 1.05 1.12   CALCIUM mg/dL 8.4* 8.1* 8.6   BILIRUBIN mg/dL  --  0.4 1.1   ALK PHOS U/L  --  84 115   ALT (SGPT) U/L  --  18 21   AST (SGOT) U/L  --  36 35   GLUCOSE mg/dL 165* 233* 423*       Culture Data:   Blood Culture   Date Value Ref Range Status   01/25/2024 No growth at 2 days  Preliminary   01/25/2024 No growth at 2 days  Preliminary     No results found for: \"URINECX\"  No results found for: \"RESPCX\"  No results found for: \"WOUNDCX\"  No results found for: \"STOOLCX\"  No components found for: \"BODYFLD\"    Radiology Data:   Imaging Results (Last 24 Hours)       ** No results found for the last 24 hours. **            I have " reviewed the patient's current medications.     Assessment/Plan     1) acute respiratory failure with hypoxia  - likely secondary to pnuemonia  - treat underlying cause  - wean O2 as tolerated    2) pneumonia  - seen on imaging  - azithromycin, ceftriaxone  - cultures negative to date    3) dysphagia  - GI consulted, appreciate recs  - s/p EGD with dilation of distal esophagus    4) esophagitis  - seen on EGD  - protonix bid, carafate tid and reglan tid    5) HTN  - home meds    6) DM  - ISS, lantus, accuchecks, diet    7) HLD  - lipitor    8) CAD  - home meds    9) hypothyoridism  - synthroid    10) mood disorders  - home meds    11) GI/DVT ppx  - protonix, carafate/SCDs          Electronically signed by Ayah Stewart MD, 01/27/24, 09:59 EST.

## 2024-01-27 NOTE — PLAN OF CARE
Goal Outcome Evaluation:                                            Patient discharging home today, no oxygen needed. Going on PO antibiotics.

## 2024-01-27 NOTE — PLAN OF CARE
Goal Outcome Evaluation:              Outcome Evaluation: Pt resting between care. Pain treated per MAR. Pt will need 6 min walk a discharge to assess oxygen needs. Pt is currently on 3L NC. Plans to discharge home when ready.

## 2024-01-27 NOTE — PROCEDURES
Exercise Oximetry    Patient Name:Manfred Perry   MRN: [id number]   Date: 01/27/24             ROOM AIR BASELINE   SpO2% 95   Heart Rate 74   Blood Pressure      EXERCISE ON ROOM AIR SpO2% EXERCISE ON O2 @  LPM SpO2%   1 MINUTE 92 1 MINUTE    2 MINUTES 92 2 MINUTES    3 MINUTES 93 3 MINUTES    4 MINUTES 92 4 MINUTES    5 MINUTES 92 5 MINUTES    6 MINUTES 93 6 MINUTES               Distance Walked   Distance Walked   Dyspnea (Tanna Scale)   Dyspnea (Tanna Scale)   Fatigue (Tanna Scale)   Fatigue (Tanna Scale)   SpO2% Post Exercise   SpO2% Post Exercise   HR Post Exercise   HR Post Exercise   Time to Recovery   Time to Recovery     Comments: Patient does not require home O2 at this time.   Holly Mann, CRT

## 2024-01-29 ENCOUNTER — TELEPHONE (OUTPATIENT)
Dept: DIABETES SERVICES | Facility: HOSPITAL | Age: 74
End: 2024-01-29
Payer: MEDICARE

## 2024-01-29 LAB
LAB AP CASE REPORT: NORMAL
PATH REPORT.FINAL DX SPEC: NORMAL
PATH REPORT.GROSS SPEC: NORMAL

## 2024-01-30 ENCOUNTER — TELEPHONE (OUTPATIENT)
Dept: DIABETES SERVICES | Facility: HOSPITAL | Age: 74
End: 2024-01-30
Payer: MEDICARE

## 2024-01-30 LAB
BACTERIA SPEC AEROBE CULT: NORMAL
BACTERIA SPEC AEROBE CULT: NORMAL

## 2024-01-30 NOTE — TELEPHONE ENCOUNTER
Educator called pt. Pt states he has not picked up the rxs yet for insulin and bs monitoring. Discussed rxs for Lantus pen, Lispro pen, pen needles, Acccuhek Guide Me bs meter, test strips and lancets have been sent to [facility]. Pt states will be picking up today or tomorrow. Pt understands importance of taking insulin as prescribed. Pt with no additional questions.

## 2024-03-29 RX ORDER — SEMAGLUTIDE 1.34 MG/ML
0.5 INJECTION, SOLUTION SUBCUTANEOUS WEEKLY
COMMUNITY

## 2024-03-29 NOTE — PRE-PROCEDURE INSTRUCTIONS
Pt did not have stop date for Plavix and ASA on scheduling sheet. Spoke with Jyoti at [facility] to call pt with stop date for 4/9 procedure.  Instructed pt to f/u as well with office.

## 2024-04-01 ENCOUNTER — TELEPHONE (OUTPATIENT)
Dept: CARDIOLOGY | Facility: CLINIC | Age: 74
End: 2024-04-01
Payer: MEDICARE

## 2024-04-01 NOTE — TELEPHONE ENCOUNTER
FACILITY: Gastroenterology   DR: Eric Bettencourt  PHONE: [phone]  FAX: [phone]  PROCEDURE: EGD  SCHEDULED: TBD  MEDS TO HOLD: Plavix and ASA

## 2024-04-09 ENCOUNTER — ON CAMPUS - OUTPATIENT (AMBULATORY)
Dept: URBAN - METROPOLITAN AREA HOSPITAL 85 | Facility: HOSPITAL | Age: 74
End: 2024-04-09
Payer: COMMERCIAL

## 2024-04-09 ENCOUNTER — ANESTHESIA EVENT (OUTPATIENT)
Dept: GASTROENTEROLOGY | Facility: HOSPITAL | Age: 74
End: 2024-04-09
Payer: MEDICARE

## 2024-04-09 ENCOUNTER — ANESTHESIA (OUTPATIENT)
Dept: GASTROENTEROLOGY | Facility: HOSPITAL | Age: 74
End: 2024-04-09
Payer: MEDICARE

## 2024-04-09 ENCOUNTER — HOSPITAL ENCOUNTER (OUTPATIENT)
Facility: HOSPITAL | Age: 74
Setting detail: HOSPITAL OUTPATIENT SURGERY
Discharge: HOME | End: 2024-04-09
Attending: INTERNAL MEDICINE | Admitting: INTERNAL MEDICINE
Payer: MEDICARE

## 2024-04-09 VITALS
RESPIRATION RATE: 13 BRPM | HEART RATE: 73 BPM | SYSTOLIC BLOOD PRESSURE: 153 MMHG | OXYGEN SATURATION: 100 % | DIASTOLIC BLOOD PRESSURE: 91 MMHG | BODY MASS INDEX: 39.38 KG/M2 | HEIGHT: 67 IN | WEIGHT: 250.88 LBS | TEMPERATURE: 98.7 F

## 2024-04-09 DIAGNOSIS — K44.9 DIAPHRAGMATIC HERNIA WITHOUT OBSTRUCTION OR GANGRENE: ICD-10-CM

## 2024-04-09 DIAGNOSIS — R13.10 DYSPHAGIA, UNSPECIFIED: ICD-10-CM

## 2024-04-09 DIAGNOSIS — K22.2 ESOPHAGEAL OBSTRUCTION: ICD-10-CM

## 2024-04-09 LAB — GLUCOSE BLDC GLUCOMTR-MCNC: 142 MG/DL (ref 70–105)

## 2024-04-09 PROCEDURE — 43235 EGD DIAGNOSTIC BRUSH WASH: CPT | Performed by: INTERNAL MEDICINE

## 2024-04-09 PROCEDURE — 43450 DILATE ESOPHAGUS 1/MULT PASS: CPT | Performed by: INTERNAL MEDICINE

## 2024-04-09 PROCEDURE — 82948 REAGENT STRIP/BLOOD GLUCOSE: CPT

## 2024-04-09 PROCEDURE — 25010000002 PROPOFOL 200 MG/20ML EMULSION: Performed by: NURSE ANESTHETIST, CERTIFIED REGISTERED

## 2024-04-09 RX ORDER — ONDANSETRON 2 MG/ML
4 INJECTION INTRAMUSCULAR; INTRAVENOUS ONCE AS NEEDED
Status: DISCONTINUED | OUTPATIENT
Start: 2024-04-09 | End: 2024-04-09 | Stop reason: HOSPADM

## 2024-04-09 RX ORDER — PANTOPRAZOLE SODIUM 40 MG/1
40 TABLET, DELAYED RELEASE ORAL DAILY
Qty: 90 TABLET | Refills: 3 | Status: SHIPPED | OUTPATIENT
Start: 2024-04-09

## 2024-04-09 RX ORDER — LIDOCAINE HYDROCHLORIDE 20 MG/ML
INJECTION, SOLUTION EPIDURAL; INFILTRATION; INTRACAUDAL; PERINEURAL AS NEEDED
Status: DISCONTINUED | OUTPATIENT
Start: 2024-04-09 | End: 2024-04-09 | Stop reason: SURG

## 2024-04-09 RX ORDER — PROPOFOL 10 MG/ML
INJECTION, EMULSION INTRAVENOUS AS NEEDED
Status: DISCONTINUED | OUTPATIENT
Start: 2024-04-09 | End: 2024-04-09 | Stop reason: SURG

## 2024-04-09 RX ORDER — SODIUM CHLORIDE 9 MG/ML
INJECTION, SOLUTION INTRAVENOUS CONTINUOUS PRN
Status: DISCONTINUED | OUTPATIENT
Start: 2024-04-09 | End: 2024-04-09 | Stop reason: SURG

## 2024-04-09 RX ADMIN — SODIUM CHLORIDE: 9 INJECTION, SOLUTION INTRAVENOUS at 10:37

## 2024-04-09 RX ADMIN — PROPOFOL 50 MG: 10 INJECTION, EMULSION INTRAVENOUS at 10:45

## 2024-04-09 RX ADMIN — LIDOCAINE HYDROCHLORIDE 50 MG: 20 INJECTION, SOLUTION EPIDURAL; INFILTRATION; INTRACAUDAL; PERINEURAL at 10:41

## 2024-04-09 RX ADMIN — PROPOFOL 50 MG: 10 INJECTION, EMULSION INTRAVENOUS at 10:47

## 2024-04-09 RX ADMIN — PROPOFOL 30 MG: 10 INJECTION, EMULSION INTRAVENOUS at 10:43

## 2024-04-09 RX ADMIN — PROPOFOL 25 MG: 10 INJECTION, EMULSION INTRAVENOUS at 10:49

## 2024-04-09 RX ADMIN — PROPOFOL 70 MG: 10 INJECTION, EMULSION INTRAVENOUS at 10:41

## 2024-04-09 NOTE — ANESTHESIA PREPROCEDURE EVALUATION
Anesthesia Evaluation     Patient summary reviewed and Nursing notes reviewed   no history of anesthetic complications:   NPO Solid Status: > 8 hours  NPO Liquid Status: > 8 hours           Airway   Mallampati: II  TM distance: >3 FB  Neck ROM: limited  Dental    (+) edentulous    Pulmonary - normal exam   (+) pneumonia improving ,  (-) not a smoker  Cardiovascular - normal exam    ECG reviewed    (+) hypertension    ROS comment: Normal EF and no major valvular disease last TTE    Neuro/Psych  (+) psychiatric history Anxiety  GI/Hepatic/Renal/Endo    (+) obesity, GERD, diabetes mellitus type 2 poorly controlled, thyroid problem     Musculoskeletal (-) negative ROS    Abdominal    Substance History - negative use     OB/GYN          Other - negative ROS                     Anesthesia Plan    ASA 3     general   total IV anesthesia  (Patient identified; pre-operative vital signs, all relevant labs/studies, complete medical/surgical/anesthetic history, full medication list, full allergy list, and NPO status obtained/reviewed; physical assessment performed; anesthetic options, side effects, potential complications, risks, and benefits discussed; questions answered; written anesthesia consent obtained; patient cleared for procedure; anesthesia machine and equipment checked and functioning)    Anesthetic plan, risks, benefits, and alternatives have been provided, discussed and informed consent has been obtained with: patient.    Plan discussed with CRNA and CAA.      CODE STATUS:

## 2024-04-09 NOTE — ANESTHESIA POSTPROCEDURE EVALUATION
Patient: Manfred Perry    Procedure Summary       Date: 04/09/24 Room / Location: [facility] ENDOSCOPY 1 / [facility] ENDOSCOPY    Anesthesia Start: 1037 Anesthesia Stop: 1056    Procedure: ESOPHAGOGASTRODUODENOSCOPY with non-guided esophageal dilation using 42, 44, and 46 Fr. Bougie Dilators Diagnosis:       Esophageal stricture      (Esophageal stricture [K22.2])    Surgeons: Eric Bettencourt MD Provider: Sana Valle MD    Anesthesia Type: general ASA Status: 3            Anesthesia Type: general    Vitals  Vitals Value Taken Time   /91 04/09/24 1107   Temp     Pulse 77 04/09/24 1109   Resp 13 04/09/24 1107   SpO2 100 % 04/09/24 1108   Vitals shown include unfiled device data.        Post Anesthesia Care and Evaluation    Patient location during evaluation: PACU  Patient participation: complete - patient participated  Level of consciousness: awake and alert  Pain management: satisfactory to patient    Airway patency: patent  Anesthetic complications: No anesthetic complications  PONV Status: none  Cardiovascular status: acceptable  Respiratory status: acceptable  Hydration status: acceptable

## 2024-04-09 NOTE — DISCHARGE INSTRUCTIONS
A responsible adult should stay with you and you should rest quietly for the rest of the day.    Do not drink alcohol, drive, operate any heavy machinery or power tools or make any legal/important decisions for the next 24 hours.     Progress your diet as tolerated.  If you begin to experience severe pain, increased shortness of breath, racing heartbeat or a fever above 101 F, seek immediate medical attention.     Follow up with MD as instructed. Call office for results in 3 to 5 days if needed. [phone]    Findings:  Esophageal stricture at the GE junction dilated from 42-46 Slovenian with effective and expected break in mucosa  Medium 4 cm hiatal hernia     Impression:  Dysphagia due to recurrent stricture     Recommendations:  Start pantoprazole 40 mg daily  Repeat EGD in 1 to 2 months for further evaluation  Hold Plavix for 3 days

## 2024-04-09 NOTE — H&P
GI Procedure H&P:    Referring Provider:    Piedad Maloney APRN Harrell, Steven, MD    Chief complaint: <principal problem not specified>    Subjective .  Dysphagia    History of present illness:      Manfred Perry is a 73 y.o. male who presents today for Procedure(s):  ESOPHAGOGASTRODUODENOSCOPY for the indications listed below.     The updated Patient Profile was reviewed prior to the procedure, in conjunction with the Physical Exam, including medical conditions, surgical procedures, medications, allergies, family history and social history.     Pre-operatively, I reviewed the indication(s) for the procedure, the risks of the procedure [including but not limited to: unexpected bleeding possibly requiring hospitalization and/or unplanned repeat procedures, perforation possibly requiring surgical treatment, missed lesions and complications of sedation/MAC (also explained by anesthesia staff)].     I have evaluated the patient for risks associated with the planned anesthesia and the procedure to be performed and find the patient an acceptable candidate for IV sedation.    Multiple opportunities were provided for any questions or concerns, and all questions were answered satisfactorily before any anesthesia was administered. We will proceed with the planned procedure.    Past Medical History:  Past Medical History:   Diagnosis Date    Coronary artery disease     Diabetes mellitus     Disease of thyroid gland     Dysphagia 01/2023    GERD (gastroesophageal reflux disease)     Hypertension     Hypokalemia 04/09/2023    Hypomagnesemia 04/09/2023       Past Surgical History:  Past Surgical History:   Procedure Laterality Date    APPENDECTOMY      CARDIAC CATHETERIZATION N/A 4/10/2023    Procedure: Left Heart Cath and coronary angiogram;  Surgeon: Matias Loyola MD;  Location: [facility] INVASIVE LOCATION;  Service: Cardiovascular;  Laterality: N/A;    CARDIAC CATHETERIZATION N/A 4/10/2023    Procedure:  Percutaneous Coronary Intervention;  Surgeon: Matias Loyola MD;  Location: [facility] CATH INVASIVE LOCATION;  Service: Cardiovascular;  Laterality: N/A;    ENDOSCOPY N/A 8/10/2022    Procedure: EGD WITH DILATION with 42 bougie and 12-15mm balloon to 15mm;  Surgeon: RAJAN Moctezuma MD;  Location: [facility] ENDOSCOPY;  Service: Gastroenterology;  Laterality: N/A;  esophagitis and hiatal hernia    ENDOSCOPY N/A 11/15/2022    Procedure: ESOPHAGOGASTRODUODENOSCOPY WITH DILATION ( BOUGIE 46, );  Surgeon: RAJAN Moctezuma MD;  Location: [facility] ENDOSCOPY;  Service: Gastroenterology;  Laterality: N/A;  HIATIAL HERNIA  GASTRITIS  ESOPHOGEAL STRICTURE    ENDOSCOPY N/A 1/19/2023    Procedure: ESOPHAGOGASTRODUODENOSCOPY with esophageal dilation (bougie 50, 52) and biopsy x 3 areas;  Surgeon: RAJAN Moctezuma MD;  Location: [facility] ENDOSCOPY;  Service: Gastroenterology;  Laterality: N/A;  esophagitis, hiatal hernia, esophageal stricture      ENDOSCOPY N/A 1/25/2024    Procedure: ESOPHAGOGASTRODUODENOSCOPY, non-guided balloon dilation of distal esophagus (12-14mm);  Surgeon: Rachel Easley MD;  Location: [facility] ENDOSCOPY;  Service: Gastroenterology;  Laterality: N/A;  post: gastritis, severe esophagitis, esophageal stricture       Social History:  Social History     Tobacco Use    Smoking status: Never    Smokeless tobacco: Never   Vaping Use    Vaping status: Never Used   Substance Use Topics    Alcohol use: Not Currently    Drug use: Never     Comment: suboxone       Family History:  History reviewed. No pertinent family history.    Medications:  Medications Prior to Admission   Medication Sig Dispense Refill Last Dose    Accu-Chek Softclix Lancets lancets 1 each by Other route 3 (Three) Times a Day Before Meals. Use as instructed  Dx code: E11.65 100 each 2 4/8/2024    atorvastatin (LIPITOR) 40 MG tablet Take 1 tablet by mouth Every Night. 90 tablet 0 4/2/2024    buprenorphine-naloxone (SUBOXONE) 8-2 MG per SL tablet Place 1  tablet under the tongue 2 (Two) Times a Day.   4/2/2024    clopidogrel (PLAVIX) 75 MG tablet Take 1 tablet by mouth Daily. 90 tablet 3 4/2/2024    glucose blood (Accu-Chek Guide) test strip 1 each by Other route 3 (Three) Times a Day Before Meals. Use as instructed  Dx code: E11.65 100 each 2 4/9/2024    hydroCHLOROthiazide (HYDRODIURIL) 25 MG tablet Take 1 tablet by mouth Daily.   4/2/2024    hydrOXYzine (ATARAX) 25 MG tablet Take 1-2 tablets by mouth Daily.   4/2/2024    Insulin Pen Needle (Pen Needles) 32G X 4 MM misc Use 1 each 4 (Four) Times a Day Before Meals & at Bedtime. Dx code: E11.65 200 each 2 4/8/2024    losartan (COZAAR) 100 MG tablet Take 1 tablet by mouth Daily.   4/2/2024    metoprolol tartrate (LOPRESSOR) 25 MG tablet Take 1 tablet by mouth Every 12 (Twelve) Hours. 180 tablet 3 4/2/2024    Semaglutide,0.25 or 0.5MG/DOS, (Ozempic, 0.25 or 0.5 MG/DOSE,) 2 MG/1.5ML solution pen-injector Inject 0.5 mg under the skin into the appropriate area as directed 1 (One) Time Per Week.   4/1/2024    aspirin 81 MG chewable tablet Chew 1 tablet Daily. 30 tablet 3 4/2/2024    Blood Glucose Monitoring Suppl (Accu-Chek Guide Me) w/Device kit Use 1 kit 3 (Three) Times a Day Before Meals. Dx code: E11.65  NDC [address]-72  Bin#: [id number] Group #: [id number]  ID #: [id number]  Issuer #: [id number]) 1 kit 0 4/2/2024    Insulin Glargine (LANTUS SOLOSTAR) 100 UNIT/ML injection pen Inject 28 Units under the skin into the appropriate area as directed Every Night. Dx code: E11.65 15 mL 2     Insulin Lispro, 1 Unit Dial, (HumaLOG KwikPen) 100 UNIT/ML solution pen-injector Inject 9 Units under the skin into the appropriate area as directed 3 (Three) Times a Day Before Meals. Dx code: E11.65 15 mL 2     levothyroxine (Synthroid) 50 MCG tablet Take 1 tablet by mouth Daily. 30 tablet 0     magnesium oxide (MAG-OX) 400 MG tablet Take 1 tablet by mouth Daily. 30 tablet 0 4/2/2024    metFORMIN (GLUCOPHAGE) 500 MG tablet Take 1 tablet by  "mouth 2 (Two) Times a Day With Meals.       QUEtiapine (SEROquel) 100 MG tablet Take 1-2 tablets by mouth Every Night.          Scheduled Meds:  Continuous Infusions:No current facility-administered medications for this encounter.    PRN Meds:.    ALLERGIES:  Patient has no known allergies.    ROS:  The following systems were reviewed and negative;   Constitution:  No fevers, chills, no unintentional weight loss  Skin: no rash, no jaundice  Eyes:  No blurry vision, no eye pain  HENT:  No change in hearing or smell  Resp:  No dyspnea or cough  CV:  No chest pain or palpitations  :  No dysuria, hematuria  Musculoskeletal:  No leg cramps or arthralgias  Neuro:  No tremor, no numbness  Psych:  No depression or confsuion    Objective     Vital Signs:   Vitals:    03/29/24 1557 04/09/24 0958   BP:  158/65   BP Location:  Left arm   Patient Position:  Lying   Pulse:  74   Resp:  18   Temp:  98.7 °F (37.1 °C)   TempSrc:  Oral   SpO2:  94%   Weight: 107 kg (235 lb) 114 kg (250 lb 14.1 oz)   Height: 172.7 cm (68\") 170.2 cm (67\")       Physical Exam:       General Appearance:    Awake and alert, in no acute distress   Head:    Normocephalic, without obvious abnormality, atraumatic   Throat:   No oral lesions, no thrush, oral mucosa moist   Lungs:     respirations regular, even and unlabored   Skin:   No rash, no jaundice       Results Review:  Lab Results (last 24 hours)       Procedure Component Value Units Date/Time    POC Glucose Once [[id number]]  (Abnormal) Collected: 04/09/24 0849    Specimen: Blood Updated: 04/09/24 0851     Glucose 142 mg/dL      Comment: Serial Number: [id number]:  [id number]               Imaging Results (Last 24 Hours)       ** No results found for the last 24 hours. **             I reviewed the patient's labs and imaging.    ASSESSMENT AND PLAN:  Dysphagia    Active Problems:    * No active hospital problems. *       Procedure(s):  ESOPHAGOGASTRODUODENOSCOPY      I discussed the patients " findings and my recommendations with the patient.    Eric Bettencourt MD  04/09/24  10:02 EDT

## 2024-04-09 NOTE — OP NOTE
ESOPHAGOGASTRODUODENOSCOPY Procedure Report    Patient Name:  Manfred Perry  YOB: 1950    Date of Surgery:  4/9/2024     Pre-Op Diagnosis:  Esophageal stricture [K22.2]       Post-Op Diagnosis Codes:     * Esophageal stricture [K22.2]  Esophageal stricture at the GE junction dilated from 42-46 Frisian with effective and expected break in mucosa  Medium 4 cm hiatal hernia    Procedure/CPT® Codes:      Procedure(s):  ESOPHAGOGASTRODUODENOSCOPY with non-guided esophageal dilation using 42, 44, and 46 Fr. Bougie Dilators    Staff:  Surgeon(s):  Eric Bettencourt MD      Anesthesia: Monitored Anesthesia Care    Description of Procedure:  A description of the procedure as well as risks, benefits and alternative methods were explained to the patient who voiced understanding and signed the corresponding consent form. A physical exam was performed and vital signs were monitored throughout the procedure.    An upper GI endoscope was placed into the mouth and proceeded through the esophagus, stomach and second portion of the duodenum without difficulty. The scope was then retroflexed and the fundus was visualized. The procedure was not difficult and there were no immediate complications.    Specimen:        See Below    Estimated blood loss: none    Complications:  None    Findings:  Esophageal stricture at the GE junction dilated from 42-46 Frisian with effective and expected break in mucosa  Medium 4 cm hiatal hernia    Impression:  Dysphagia due to recurrent stricture    Recommendations:  Start pantoprazole 40 mg daily  Repeat EGD in 1 to 2 months for further evaluation  Hold Plavix for 3 days      Eric Bettencourt MD     Date: 4/9/2024    Time: 10:53 EDT

## 2024-06-24 ENCOUNTER — OFFICE VISIT (OUTPATIENT)
Dept: CARDIOLOGY | Facility: CLINIC | Age: 74
End: 2024-06-24
Payer: MEDICARE

## 2024-06-24 VITALS
WEIGHT: 235 LBS | BODY MASS INDEX: 36.88 KG/M2 | SYSTOLIC BLOOD PRESSURE: 136 MMHG | OXYGEN SATURATION: 98 % | DIASTOLIC BLOOD PRESSURE: 82 MMHG | HEART RATE: 62 BPM | HEIGHT: 67 IN

## 2024-06-24 DIAGNOSIS — E78.00 PURE HYPERCHOLESTEROLEMIA: ICD-10-CM

## 2024-06-24 DIAGNOSIS — I10 PRIMARY HYPERTENSION: ICD-10-CM

## 2024-06-24 DIAGNOSIS — E11.9 TYPE 2 DIABETES MELLITUS WITHOUT COMPLICATION, WITHOUT LONG-TERM CURRENT USE OF INSULIN: ICD-10-CM

## 2024-06-24 DIAGNOSIS — I25.10 CORONARY ARTERY DISEASE INVOLVING NATIVE CORONARY ARTERY OF NATIVE HEART WITHOUT ANGINA PECTORIS: Primary | ICD-10-CM

## 2024-06-24 PROCEDURE — 99214 OFFICE O/P EST MOD 30 MIN: CPT | Performed by: INTERNAL MEDICINE

## 2024-06-24 PROCEDURE — 1159F MED LIST DOCD IN RCRD: CPT | Performed by: INTERNAL MEDICINE

## 2024-06-24 PROCEDURE — 1160F RVW MEDS BY RX/DR IN RCRD: CPT | Performed by: INTERNAL MEDICINE

## 2024-06-24 PROCEDURE — 3075F SYST BP GE 130 - 139MM HG: CPT | Performed by: INTERNAL MEDICINE

## 2024-06-24 PROCEDURE — 3079F DIAST BP 80-89 MM HG: CPT | Performed by: INTERNAL MEDICINE

## 2024-06-24 RX ORDER — NITROGLYCERIN 0.4 MG/1
0.4 TABLET SUBLINGUAL
COMMUNITY

## 2024-06-24 RX ORDER — DARIDOREXANT 50 MG/1
50 TABLET, FILM COATED ORAL NIGHTLY
COMMUNITY

## 2024-06-24 RX ORDER — ONDANSETRON 8 MG/1
8 TABLET, ORALLY DISINTEGRATING ORAL EVERY 12 HOURS PRN
COMMUNITY

## 2024-06-24 NOTE — PROGRESS NOTES
Subjective:     Encounter Date:06/24/2024      Patient ID: Manfred Perry is a 74 y.o. male.    Chief Complaint:  History of Present Illness  73-year-old white male with history of coronary status post and placement of the circumflex artery history of hypertension hyperlipidemia and presents to the office for follow-up.  Patient is currently stable without any symptoms of chest pain or shortness of breath at rest or exertion.  No complaint of any PND or orthopnea.  No palpitations dizziness syncope or swelling of the feet.  Patient is taking all the medicines regularly.  Patient does not smoke.  The following portions of the patient's history were reviewed and updated as appropriate: allergies, current medications, past family history, past medical history, past social history, past surgical history, and problem list.  Past Medical History:   Diagnosis Date    Coronary artery disease     Diabetes mellitus     Disease of thyroid gland     Dysphagia 01/2023    GERD (gastroesophageal reflux disease)     Hypertension     Hypokalemia 04/09/2023    Hypomagnesemia 04/09/2023     Past Surgical History:   Procedure Laterality Date    APPENDECTOMY      CARDIAC CATHETERIZATION N/A 4/10/2023    Procedure: Left Heart Cath and coronary angiogram;  Surgeon: Matias Loyola MD;  Location: [facility] CATH INVASIVE LOCATION;  Service: Cardiovascular;  Laterality: N/A;    CARDIAC CATHETERIZATION N/A 4/10/2023    Procedure: Percutaneous Coronary Intervention;  Surgeon: Matias Loyola MD;  Location: [facility] CATH INVASIVE LOCATION;  Service: Cardiovascular;  Laterality: N/A;    ENDOSCOPY N/A 8/10/2022    Procedure: EGD WITH DILATION with 42 bougie and 12-15mm balloon to 15mm;  Surgeon: RAJAN Moctezuma MD;  Location: [facility] ENDOSCOPY;  Service: Gastroenterology;  Laterality: N/A;  esophagitis and hiatal hernia    ENDOSCOPY N/A 11/15/2022    Procedure: ESOPHAGOGASTRODUODENOSCOPY WITH DILATION ( BOUGIE 46, );  Surgeon: RAJAN Moctezuma MD;   "Location: [facility] ENDOSCOPY;  Service: Gastroenterology;  Laterality: N/A;  HIATIAL HERNIA  GASTRITIS  ESOPHOGEAL STRICTURE    ENDOSCOPY N/A 1/19/2023    Procedure: ESOPHAGOGASTRODUODENOSCOPY with esophageal dilation (bougie 50, 52) and biopsy x 3 areas;  Surgeon: RAJAN Moctezuma MD;  Location: [facility] ENDOSCOPY;  Service: Gastroenterology;  Laterality: N/A;  esophagitis, hiatal hernia, esophageal stricture      ENDOSCOPY N/A 1/25/2024    Procedure: ESOPHAGOGASTRODUODENOSCOPY, non-guided balloon dilation of distal esophagus (12-14mm);  Surgeon: Rachel Easley MD;  Location: [facility] ENDOSCOPY;  Service: Gastroenterology;  Laterality: N/A;  post: gastritis, severe esophagitis, esophageal stricture    ENDOSCOPY N/A 4/9/2024    Procedure: ESOPHAGOGASTRODUODENOSCOPY with non-guided esophageal dilation using 42, 44, and 46 Fr. Bougie Dilators;  Surgeon: Eric Bettencourt MD;  Location: [facility] ENDOSCOPY;  Service: Gastroenterology;  Laterality: N/A;  hiatal hernia, stricture     /82 (BP Location: Left arm, Patient Position: Sitting, Cuff Size: Adult)   Pulse 62   Ht 170.2 cm (67\")   Wt 107 kg (235 lb)   SpO2 98%   BMI 36.81 kg/m²   History reviewed. No pertinent family history.    Current Outpatient Medications:     Accu-Chek Softclix Lancets lancets, 1 each by Other route 3 (Three) Times a Day Before Meals. Use as instructed Dx code: E11.65, Disp: 100 each, Rfl: 2    aspirin 81 MG chewable tablet, Chew 1 tablet Daily., Disp: 30 tablet, Rfl: 3    atorvastatin (LIPITOR) 40 MG tablet, Take 1 tablet by mouth Every Night., Disp: 90 tablet, Rfl: 0    Blood Glucose Monitoring Suppl (Accu-Chek Guide Me) w/Device kit, Use 1 kit 3 (Three) Times a Day Before Meals. Dx code: E11.65  NDC [address]-59  Bin#: [id number] Group #: [id number]  ID #: [id number]  Issuer #: ([id number]), Disp: 1 kit, Rfl: 0    buprenorphine-naloxone (SUBOXONE) 8-2 MG per SL tablet, Place 1 tablet under the tongue 2 (Two) Times a Day., Disp: , Rfl:     " clopidogrel (PLAVIX) 75 MG tablet, Take 1 tablet by mouth Daily., Disp: 90 tablet, Rfl: 3    Daridorexant HCl (Quviviq) 50 MG tablet, Take 1 tablet by mouth Every Night., Disp: , Rfl:     glucose blood (Accu-Chek Guide) test strip, 1 each by Other route 3 (Three) Times a Day Before Meals. Use as instructed Dx code: E11.65, Disp: 100 each, Rfl: 2    hydroCHLOROthiazide (HYDRODIURIL) 25 MG tablet, Take 1 tablet by mouth Daily., Disp: , Rfl:     hydrOXYzine (ATARAX) 25 MG tablet, Take 1-2 tablets by mouth Daily., Disp: , Rfl:     Insulin Pen Needle (Pen Needles) 32G X 4 MM misc, Use 1 each 4 (Four) Times a Day Before Meals & at Bedtime. Dx code: E11.65, Disp: 200 each, Rfl: 2    losartan (COZAAR) 100 MG tablet, Take 1 tablet by mouth Daily., Disp: , Rfl:     nitroglycerin (NITROSTAT) 0.4 MG SL tablet, Place 1 tablet under the tongue Every 5 (Five) Minutes As Needed for Chest Pain., Disp: , Rfl:     ondansetron ODT (ZOFRAN-ODT) 8 MG disintegrating tablet, Place 1 tablet on the tongue Every 12 (Twelve) Hours As Needed., Disp: , Rfl:     pantoprazole (PROTONIX) 40 MG EC tablet, Take 1 tablet by mouth Daily., Disp: 90 tablet, Rfl: 3    Semaglutide,0.25 or 0.5MG/DOS, (Ozempic, 0.25 or 0.5 MG/DOSE,) 2 MG/1.5ML solution pen-injector, Inject 0.5 mg under the skin into the appropriate area as directed 1 (One) Time Per Week., Disp: , Rfl:     Insulin Glargine (LANTUS SOLOSTAR) 100 UNIT/ML injection pen, Inject 28 Units under the skin into the appropriate area as directed Every Night. Dx code: E11.65 (Patient not taking: Reported on 6/24/2024), Disp: 15 mL, Rfl: 2    Insulin Lispro, 1 Unit Dial, (HumaLOG KwikPen) 100 UNIT/ML solution pen-injector, Inject 9 Units under the skin into the appropriate area as directed 3 (Three) Times a Day Before Meals. Dx code: E11.65 (Patient not taking: Reported on 6/24/2024), Disp: 15 mL, Rfl: 2    levothyroxine (Synthroid) 50 MCG tablet, Take 1 tablet by mouth Daily. (Patient not taking:  Reported on 6/24/2024), Disp: 30 tablet, Rfl: 0    magnesium oxide (MAG-OX) 400 MG tablet, Take 1 tablet by mouth Daily. (Patient not taking: Reported on 6/24/2024), Disp: 30 tablet, Rfl: 0    metFORMIN (GLUCOPHAGE) 500 MG tablet, Take 1 tablet by mouth 2 (Two) Times a Day With Meals. (Patient not taking: Reported on 6/24/2024), Disp: , Rfl:     metoprolol tartrate (LOPRESSOR) 25 MG tablet, Take 1 tablet by mouth Every 12 (Twelve) Hours. (Patient not taking: Reported on 6/24/2024), Disp: 180 tablet, Rfl: 3    QUEtiapine (SEROquel) 100 MG tablet, Take 1-2 tablets by mouth Every Night. (Patient not taking: Reported on 6/24/2024), Disp: , Rfl:   No Known Allergies  Social History     Socioeconomic History    Marital status:    Tobacco Use    Smoking status: Never     Passive exposure: Never    Smokeless tobacco: Never   Vaping Use    Vaping status: Never Used   Substance and Sexual Activity    Alcohol use: Not Currently    Drug use: Not Currently     Comment: suboxone    Sexual activity: Defer     Review of Systems   Constitutional: Negative for malaise/fatigue.   Cardiovascular:  Negative for chest pain, dyspnea on exertion, leg swelling and palpitations.   Respiratory:  Negative for cough and shortness of breath.    Gastrointestinal:  Positive for nausea. Negative for abdominal pain and vomiting.   Neurological:  Negative for dizziness, focal weakness, headaches, light-headedness and numbness.   All other systems reviewed and are negative.             Objective:     Constitutional:       Appearance: Well-developed.   Eyes:      General: No scleral icterus.     Conjunctiva/sclera: Conjunctivae normal.   HENT:      Head: Normocephalic and atraumatic.   Neck:      Vascular: No carotid bruit or JVD.   Pulmonary:      Effort: Pulmonary effort is normal.      Breath sounds: Normal breath sounds. No wheezing. No rales.   Cardiovascular:      Normal rate. Regular rhythm.   Pulses:     Intact distal pulses.    Abdominal:      General: Bowel sounds are normal.      Palpations: Abdomen is soft.   Musculoskeletal:      Cervical back: Normal range of motion and neck supple. Skin:     General: Skin is warm and dry.      Findings: No rash.   Neurological:      Mental Status: Alert.       Procedures    Lab Review:         MDM    #1 coronary artery disease  Patient had stent placement to circumflex artery and is currently stable on medications with normal function  2.  Hypertension  Patient blood pressure currently stable on metoprolol  3.  Diabetes  Patient is on insulin and followed by primary care doctor  4.  Hyperlipidemia  Patient on statins and the lipid levels are well within normal limits    Patient's previous medical records, labs, and EKG were reviewed and discussed with the patient at today's visit.

## 2024-10-24 NOTE — ANESTHESIA PREPROCEDURE EVALUATION
Anesthesia Evaluation     Patient summary reviewed and Nursing notes reviewed   NPO Solid Status: > 6 hours  NPO Liquid Status: > 6 hours           Airway   Mallampati: II  TM distance: >3 FB  Neck ROM: full  No difficulty expected  Dental - normal exam     Pulmonary - negative pulmonary ROS and normal exam    breath sounds clear to auscultation  Cardiovascular - normal exam    ECG reviewed  Rhythm: regular  Rate: normal    (+) hypertension,       Neuro/Psych- negative ROS  GI/Hepatic/Renal/Endo    (+)  GERD,  thyroid problem     Musculoskeletal (-) negative ROS    Abdominal  - normal exam    Abdomen: soft.  Bowel sounds: normal.   Substance History - negative use     OB/GYN negative ob/gyn ROS         Other - negative ROS                       Anesthesia Plan    ASA 2     MAC     intravenous induction     Anesthetic plan, risks, benefits, and alternatives have been provided, discussed and informed consent has been obtained with: patient.    Plan discussed with CAA.        CODE STATUS:       
.

## (undated) DEVICE — CATH DIAG IMPULSE FL4 6F 100CM

## (undated) DEVICE — PINNACLE INTRODUCER SHEATH: Brand: PINNACLE

## (undated) DEVICE — STPCK 3WY HP ROT

## (undated) DEVICE — DEV INFL COMPAK W/ACCESSPLUS IN4530

## (undated) DEVICE — BITEBLOCK ENDO W/STRAP 60F A/ LF DISP

## (undated) DEVICE — PK ENDO GI 50

## (undated) DEVICE — SINGLE-USE BIOPSY FORCEPS: Brand: RADIAL JAW 4

## (undated) DEVICE — Device

## (undated) DEVICE — FRCP BX RADJAW4 NDL 2.8 240CM LG OG BX80

## (undated) DEVICE — DEV INFL ALLIANCE2 SYS

## (undated) DEVICE — HI-TORQUE WHISPER MS GUIDE WIRE .014 STRAIGHT TIP 3.0 CM X 190 CM: Brand: HI-TORQUE WHISPER

## (undated) DEVICE — DEV INFL CRE STERIFLATE 60CC DISP

## (undated) DEVICE — KT PK ANGIOPLASTY ACC 9 MERIT

## (undated) DEVICE — BOWL PLSTC MD 16OZ BLU STRL

## (undated) DEVICE — GW PTFE EMERALD HEPCOAT FC J TIP STD .035 3MM 150CM

## (undated) DEVICE — CATH DIAG IMPULSE FR4 6F 100CM

## (undated) DEVICE — CONTRST ISOVUE300 61PCT 50ML

## (undated) DEVICE — ESOPHAGEAL BALLOON DILATATION CATHETER: Brand: CRE FIXED WIRE

## (undated) DEVICE — BALN EUPHORA 2.5X15MM

## (undated) DEVICE — GUIDE CATHETER: Brand: MACH1™

## (undated) DEVICE — TBG NAMIC PRESS MONTR A/ F/M 12IN

## (undated) DEVICE — PK TRY HEART CATH 50

## (undated) DEVICE — ELECTRD DEFIB M/FUNC PROPADZ RADIOL 2PK